# Patient Record
Sex: FEMALE | Race: WHITE | NOT HISPANIC OR LATINO | Employment: FULL TIME | URBAN - METROPOLITAN AREA
[De-identification: names, ages, dates, MRNs, and addresses within clinical notes are randomized per-mention and may not be internally consistent; named-entity substitution may affect disease eponyms.]

---

## 2017-01-09 ENCOUNTER — TRANSFERRED RECORDS (OUTPATIENT)
Dept: HEALTH INFORMATION MANAGEMENT | Facility: CLINIC | Age: 72
End: 2017-01-09

## 2017-02-14 ENCOUNTER — ANTICOAGULATION THERAPY VISIT (OUTPATIENT)
Dept: NURSING | Facility: CLINIC | Age: 72
End: 2017-02-14
Payer: COMMERCIAL

## 2017-02-14 DIAGNOSIS — Z79.01 LONG-TERM (CURRENT) USE OF ANTICOAGULANTS: ICD-10-CM

## 2017-02-14 DIAGNOSIS — Z86.718 PERSONAL HISTORY OF DVT (DEEP VEIN THROMBOSIS): ICD-10-CM

## 2017-02-14 DIAGNOSIS — D68.51 FACTOR V LEIDEN MUTATION (H): ICD-10-CM

## 2017-02-14 LAB — INR POINT OF CARE: 2.2 (ref 0.86–1.14)

## 2017-02-14 PROCEDURE — 99207 ZZC NO CHARGE NURSE ONLY: CPT

## 2017-02-14 PROCEDURE — 36416 COLLJ CAPILLARY BLOOD SPEC: CPT

## 2017-02-14 PROCEDURE — 85610 PROTHROMBIN TIME: CPT | Mod: QW

## 2017-02-14 NOTE — PROGRESS NOTES
ANTICOAGULATION FOLLOW-UP CLINIC VISIT    Patient Name:  Kathya Breaux  Date:  2/14/2017  Contact Type:  Face to Face    SUBJECTIVE:     Patient Findings     Positives No Problem Findings    Comments ? Missed a day without salad, change in diet             OBJECTIVE    INR Protime   Date Value Ref Range Status   12/30/2016 1.7 (A) 0.86 - 1.14 Final       ASSESSMENT / PLAN  INR assessment SUPRA    Recheck INR In: 6 WEEKS    INR Location Clinic      Anticoagulation Summary as of 2/14/2017     INR goal 1.5-2.0   Today's INR    Maintenance plan 5 mg (5 mg x 1) on Mon, Wed, Fri; 2.5 mg (5 mg x 0.5) all other days   Full instructions 5 mg on Mon, Wed, Fri; 2.5 mg all other days   Weekly total 25 mg   No change documented Micaela Hylton, RN   Plan last modified Landy Garcia RN (3/11/2016)   Next INR check 3/28/2017   Priority INR   Target end date     Indications   Long-term (current) use of anticoagulants [Z79.01] [Z79.01]  Personal history of DVT (deep vein thrombosis) [Z86.718]  Factor V Leiden Heterozygote [D68.51]         Anticoagulation Episode Summary     INR check location     Preferred lab     Send INR reminders to AN ANTICOAG CLINIC    Comments       Anticoagulation Care Providers     Provider Role Specialty Phone number    Azalia Daugherty MD [facility] [phone]            See the Encounter Report to view Anticoagulation Flowsheet and Dosing Calendar (Go to Encounters tab in chart review, and find the Anticoagulation Therapy Visit)    Dosage adjustment made based on physician directed care plan.    Micaela Hylton, GUILLERMINA

## 2017-02-14 NOTE — MR AVS SNAPSHOT
Kathya HO Saeid   2/14/2017 9:00 AM   Anticoagulation Therapy Visit    Description:  71 year old female   Provider:  [facility]   Department:  [facility]           INR as of 2/14/2017     Today's INR       Anticoagulation Summary as of 2/14/2017     INR goal 1.5-2.0   Today's INR    Full instructions 5 mg on Mon, Wed, Fri; 2.5 mg all other days   Next INR check 3/28/2017    Indications   Long-term (current) use of anticoagulants [Z79.01] [Z79.01]  Personal history of DVT (deep vein thrombosis) [Z86.718]  Factor V Leiden Heterozygote [D68.51]         Description     Will have a green leafy salad today.        Your next Anticoagulation Clinic appointment(s)     Mar 28, 2017 10:00 AM CDT   [facility] with AN ANTI [facility]AG   [facility]    [address] [facility] [address] [phone]              Contact Numbers     [facility]  Please call [phone] to cancel and/or reschedule your appointment   Please call [phone] with any problems or questions regarding your therapy.        February 2017 Details    Sun Mon Tue Wed Thu Fri Sat        1               2               3               4                 5               6               7               8               9               10               11                 12               13               14      2.5 mg   See details      15      5 mg         16      2.5 mg         17      5 mg         18      2.5 mg           19      2.5 mg         20      5 mg         21      2.5 mg         22      5 mg         23      2.5 mg         24      5 mg         25      2.5 mg           26      2.5 mg         27      5 mg         28      2.5 mg              Date Details   02/14 This INR check               How to take your warfarin dose     To take:  2.5 mg Take 0.5 of a 5 mg tablet.    To take:  5 mg Take 1 of the 5 mg tablets.           March 2017 Details    Sun Mon Tue Wed Thu Fri Sat        1      5 mg          2      2.5 mg         3      5 mg         4      2.5 mg           5      2.5 mg         6      5 mg         7      2.5 mg         8      5 mg         9      2.5 mg         10      5 mg         11      2.5 mg           12      2.5 mg         13      5 mg         14      2.5 mg         15      5 mg         16      2.5 mg         17      5 mg         18      2.5 mg           19      2.5 mg         20      5 mg         21      2.5 mg         22      5 mg         23      2.5 mg         24      5 mg         25      2.5 mg           26      2.5 mg         27      5 mg         28            29               30               31                 Date Details   No additional details    Date of next INR:  3/28/2017         How to take your warfarin dose     To take:  2.5 mg Take 0.5 of a 5 mg tablet.    To take:  5 mg Take 1 of the 5 mg tablets.

## 2017-03-17 ENCOUNTER — TRANSFERRED RECORDS (OUTPATIENT)
Dept: HEALTH INFORMATION MANAGEMENT | Facility: CLINIC | Age: 72
End: 2017-03-17

## 2017-03-20 ENCOUNTER — TRANSFERRED RECORDS (OUTPATIENT)
Dept: HEALTH INFORMATION MANAGEMENT | Facility: CLINIC | Age: 72
End: 2017-03-20

## 2017-03-27 ENCOUNTER — ANTICOAGULATION THERAPY VISIT (OUTPATIENT)
Dept: NURSING | Facility: CLINIC | Age: 72
End: 2017-03-27
Payer: COMMERCIAL

## 2017-03-27 DIAGNOSIS — Z79.01 LONG-TERM (CURRENT) USE OF ANTICOAGULANTS: ICD-10-CM

## 2017-03-27 DIAGNOSIS — D68.51 FACTOR V LEIDEN MUTATION (H): ICD-10-CM

## 2017-03-27 DIAGNOSIS — Z86.718 PERSONAL HISTORY OF DVT (DEEP VEIN THROMBOSIS): ICD-10-CM

## 2017-03-27 LAB — INR POINT OF CARE: 1.7 (ref 0.86–1.14)

## 2017-03-27 PROCEDURE — 85610 PROTHROMBIN TIME: CPT | Mod: QW

## 2017-03-27 PROCEDURE — 99207 ZZC NO CHARGE NURSE ONLY: CPT

## 2017-03-27 PROCEDURE — 36416 COLLJ CAPILLARY BLOOD SPEC: CPT

## 2017-03-27 NOTE — MR AVS SNAPSHOT
Kathya Breaux   3/27/2017 10:00 AM   Anticoagulation Therapy Visit    Description:  71 year old female   Provider:  [facility]   Department:  [facility]           INR as of 3/27/2017     Today's INR 1.7      Anticoagulation Summary as of 3/27/2017     INR goal 1.5-2.0   Today's INR 1.7   Full instructions 5 mg on Mon, Wed, Fri; 2.5 mg all other days   Next INR check 5/8/2017    Indications   Long-term (current) use of anticoagulants [Z79.01] [Z79.01]  Personal history of DVT (deep vein thrombosis) [Z86.718]  Factor V Leiden Heterozygote [D68.51]         Your next Anticoagulation Clinic appointment(s)     Mar 27, 2017 10:00 AM CDT   Anticoagulation Visit with [facility] ANTI [facility]AG   [facility] ([address] [address] [phone]            May 08, 2017 10:00 AM CDT   Anticoagulation Visit with [facility] ANTI [facility]AG   [facility] ([address] [address] [phone]              Contact Numbers     [facility]  Please call [phone] to cancel and/or reschedule your appointment   Please call [phone] with any problems or questions regarding your therapy.        March 2017 Details    Sun Mon Tue Wed Thu Fri Sat        1               2               3               4                 5               6               7               8               9               10               11                 12               13               14               15               16               17               18                 19               20               21               22               23               24               25                 26               27      5 mg   See details      28      2.5 mg         29      5 mg         30      2.5 mg         31      5 mg           Date Details   03/27 This INR check               How to take your warfarin dose     To take:  2.5 mg Take 0.5 of a 5  mg tablet.    To take:  5 mg Take 1 of the 5 mg tablets.           April 2017 Details    Sun Mon Tue Wed Thu Fri Sat           1      2.5 mg           2      2.5 mg         3      5 mg         4      2.5 mg         5      5 mg         6      2.5 mg         7      5 mg         8      2.5 mg           9      2.5 mg         10      5 mg         11      2.5 mg         12      5 mg         13      2.5 mg         14      5 mg         15      2.5 mg           16      2.5 mg         17      5 mg         18      2.5 mg         19      5 mg         20      2.5 mg         21      5 mg         22      2.5 mg           23      2.5 mg         24      5 mg         25      2.5 mg         26      5 mg         27      2.5 mg         28      5 mg         29      2.5 mg           30      2.5 mg                Date Details   No additional details            How to take your warfarin dose     To take:  2.5 mg Take 0.5 of a 5 mg tablet.    To take:  5 mg Take 1 of the 5 mg tablets.           May 2017 Details    Sun Mon Tue Wed Thu Fri Sat      1      5 mg         2      2.5 mg         3      5 mg         4      2.5 mg         5      5 mg         6      2.5 mg           7      2.5 mg         8            9               10               11               12               13                 14               15               16               17               18               19               20                 21               22               23               24               25               26               27                 28               29               30               31                   Date Details   No additional details    Date of next INR:  5/8/2017         How to take your warfarin dose     To take:  2.5 mg Take 0.5 of a 5 mg tablet.    To take:  5 mg Take 1 of the 5 mg tablets.

## 2017-03-27 NOTE — PROGRESS NOTES
ANTICOAGULATION FOLLOW-UP CLINIC VISIT    Patient Name:  Kathya Breaux  Date:  3/27/2017  Contact Type:  Face to Face    SUBJECTIVE:     Patient Findings     Positives No Problem Findings           OBJECTIVE    INR Protime   Date Value Ref Range Status   03/27/2017 1.7 (A) 0.86 - 1.14 Final       ASSESSMENT / PLAN  INR assessment THER    Recheck INR In: 6 WEEKS    INR Location Clinic      Anticoagulation Summary as of 3/27/2017     INR goal 1.5-2.0   Today's INR 1.7   Maintenance plan 5 mg (5 mg x 1) on Mon, Wed, Fri; 2.5 mg (5 mg x 0.5) all other days   Full instructions 5 mg on Mon, Wed, Fri; 2.5 mg all other days   Weekly total 25 mg   No change documented Phyllis Patel RN   Plan last modified Landy Garcia RN (3/11/2016)   Next INR check 5/8/2017   Priority INR   Target end date     Indications   Long-term (current) use of anticoagulants [Z79.01] [Z79.01]  Personal history of DVT (deep vein thrombosis) [Z86.718]  Factor V Leiden Heterozygote [D68.51]         Anticoagulation Episode Summary     INR check location     Preferred lab     Send INR reminders to AN ANTICOAG CLINIC    Comments       Anticoagulation Care Providers     Provider Role Specialty Phone number    Azalia Daugherty MD [facility] [phone]            See the Encounter Report to view Anticoagulation Flowsheet and Dosing Calendar (Go to Encounters tab in chart review, and find the Anticoagulation Therapy Visit)    Dosage adjustment made based on physician directed care plan. Reviewed both bleeding and clotting signs and symptoms with patient this visit. Pt. has no further questions or concerns.  Will call with any changes to diet, medications, or missed doses at INR line [phone].      Phyllis Patel, RN

## 2017-05-08 ENCOUNTER — ANTICOAGULATION THERAPY VISIT (OUTPATIENT)
Dept: NURSING | Facility: CLINIC | Age: 72
End: 2017-05-08
Payer: COMMERCIAL

## 2017-05-08 DIAGNOSIS — D68.51 FACTOR V LEIDEN MUTATION (H): ICD-10-CM

## 2017-05-08 DIAGNOSIS — Z86.718 PERSONAL HISTORY OF DVT (DEEP VEIN THROMBOSIS): ICD-10-CM

## 2017-05-08 DIAGNOSIS — Z79.01 LONG-TERM (CURRENT) USE OF ANTICOAGULANTS: ICD-10-CM

## 2017-05-08 LAB — INR POINT OF CARE: 2.2 (ref 0.86–1.14)

## 2017-05-08 PROCEDURE — 85610 PROTHROMBIN TIME: CPT | Mod: QW

## 2017-05-08 PROCEDURE — 36416 COLLJ CAPILLARY BLOOD SPEC: CPT

## 2017-05-08 PROCEDURE — 99207 ZZC NO CHARGE NURSE ONLY: CPT

## 2017-05-08 NOTE — MR AVS SNAPSHOT
Kathyadean Breaux   5/8/2017 10:00 AM   Anticoagulation Therapy Visit    Description:  71 year old female   Provider:  [facility]   Department:  [facility]           INR as of 5/8/2017     Today's INR 2.2!      Anticoagulation Summary as of 5/8/2017     INR goal 1.5-2.0   Today's INR 2.2!   Full instructions 5 mg on Mon, Wed, Fri; 2.5 mg all other days   Next INR check 6/19/2017    Indications   Long-term (current) use of anticoagulants [Z79.01] [Z79.01]  Personal history of DVT (deep vein thrombosis) [Z86.718]  Factor V Leiden Heterozygote [D68.51]         Your next Anticoagulation Clinic appointment(s)     May 08, 2017 10:00 AM CDT   [facility]agulation [facility] with [facility]TI [facility]   [facility]    [address] [place] [address] [phone]            Jun 19, 2017 10:00 AM CDT   Anticoagulation Visit with [facility] ANTI [facility]AG   [facility]    [address] [place] [address] [phone]              Contact Numbers     [facility]  Please call [phone] to cancel and/or reschedule your appointment   Please call [phone] with any problems or questions regarding your therapy.        May 2017 Details    Sun Mon Tue Wed Thu Fri Sat      1               2               3               4               5               6                 7               8      5 mg   See details      9      2.5 mg         10      5 mg         11      2.5 mg         12      5 mg         13      2.5 mg           14      2.5 mg         15      5 mg         16      2.5 mg         17      5 mg         18      2.5 mg         19      5 mg         20      2.5 mg           21      2.5 mg         22      5 mg         23      2.5 mg         24      5 mg         25      2.5 mg         26      5 mg         27      2.5 mg           28      2.5 mg         29      5 mg         30      2.5 mg         31      5 mg             Date Details   05/08  This INR check               How to take your warfarin dose     To take:  2.5 mg Take 0.5 of a 5 mg tablet.    To take:  5 mg Take 1 of the 5 mg tablets.           June 2017 Details    Sun Mon Tue Wed Thu Fri Sat         1      2.5 mg         2      5 mg         3      2.5 mg           4      2.5 mg         5      5 mg         6      2.5 mg         7      5 mg         8      2.5 mg         9      5 mg         10      2.5 mg           11      2.5 mg         12      5 mg         13      2.5 mg         14      5 mg         15      2.5 mg         16      5 mg         17      2.5 mg           18      2.5 mg         19            20               21               22               23               24                 25               26               27               28               29               30                 Date Details   No additional details    Date of next INR:  6/19/2017         How to take your warfarin dose     To take:  2.5 mg Take 0.5 of a 5 mg tablet.    To take:  5 mg Take 1 of the 5 mg tablets.

## 2017-05-08 NOTE — PROGRESS NOTES
ANTICOAGULATION FOLLOW-UP CLINIC VISIT    Patient Name:  Kathya Breaux  Date:  5/8/2017  Contact Type:  Face to Face    SUBJECTIVE:     Patient Findings     Positives No Problem Findings    Comments Pt. Just got back from a long vacation - reports eating differently than normal.           OBJECTIVE    INR Protime   Date Value Ref Range Status   05/08/2017 2.2 (A) 0.86 - 1.14 Final       ASSESSMENT / PLAN  INR assessment THER    Recheck INR In: 6 WEEKS    INR Location Clinic      Anticoagulation Summary as of 5/8/2017     INR goal 1.5-2.0   Today's INR 2.2!   Maintenance plan 5 mg (5 mg x 1) on Mon, Wed, Fri; 2.5 mg (5 mg x 0.5) all other days   Full instructions 5 mg on Mon, Wed, Fri; 2.5 mg all other days   Weekly total 25 mg   No change documented Phyllis Patel RN   Plan last modified Landy Garcia RN (3/11/2016)   Next INR check 6/19/2017   Priority INR   Target end date     Indications   Long-term (current) use of anticoagulants [Z79.01] [Z79.01]  Personal history of DVT (deep vein thrombosis) [Z86.718]  Factor V Leiden Heterozygote [D68.51]         Anticoagulation Episode Summary     INR check location     Preferred lab     Send INR reminders to AN ANTICOAG CLINIC    Comments       Anticoagulation Care Providers     Provider Role Specialty Phone number    Azalia Daugherty MD [facility] [phone]            See the Encounter Report to view Anticoagulation Flowsheet and Dosing Calendar (Go to Encounters tab in chart review, and find the Anticoagulation Therapy Visit)    Dosage adjustment made based on physician directed care plan. Reviewed both bleeding and clotting signs and symptoms with patient this visit. Pt. has no further questions or concerns.  Will call with any changes to diet, medications, or missed doses at INR line [phone].      Phyllis Patel, GUILLERMINA

## 2017-06-09 ENCOUNTER — TRANSFERRED RECORDS (OUTPATIENT)
Dept: HEALTH INFORMATION MANAGEMENT | Facility: CLINIC | Age: 72
End: 2017-06-09

## 2017-06-19 ENCOUNTER — ANTICOAGULATION THERAPY VISIT (OUTPATIENT)
Dept: NURSING | Facility: CLINIC | Age: 72
End: 2017-06-19
Payer: COMMERCIAL

## 2017-06-19 DIAGNOSIS — Z86.718 PERSONAL HISTORY OF DVT (DEEP VEIN THROMBOSIS): ICD-10-CM

## 2017-06-19 DIAGNOSIS — Z79.01 LONG-TERM (CURRENT) USE OF ANTICOAGULANTS: ICD-10-CM

## 2017-06-19 DIAGNOSIS — D68.51 FACTOR V LEIDEN MUTATION (H): ICD-10-CM

## 2017-06-19 LAB — INR POINT OF CARE: 2.1 (ref 0.86–1.14)

## 2017-06-19 PROCEDURE — 85610 PROTHROMBIN TIME: CPT | Mod: QW

## 2017-06-19 PROCEDURE — 99207 ZZC NO CHARGE NURSE ONLY: CPT

## 2017-06-19 PROCEDURE — 36416 COLLJ CAPILLARY BLOOD SPEC: CPT

## 2017-06-19 NOTE — PROGRESS NOTES
ANTICOAGULATION FOLLOW-UP CLINIC VISIT    Patient Name:  Kathya Breaux  Date:  6/19/2017  Contact Type:  Face to Face    SUBJECTIVE:     Patient Findings     Positives No Problem Findings           OBJECTIVE    INR Protime   Date Value Ref Range Status   06/19/2017 2.1 (A) 0.86 - 1.14 Final       ASSESSMENT / PLAN  INR assessment THER    Recheck INR In: 6 WEEKS    INR Location Clinic      Anticoagulation Summary as of 6/19/2017     INR goal 1.5-2.0   Today's INR 2.1!   Maintenance plan 5 mg (5 mg x 1) on Mon, Wed, Fri; 2.5 mg (5 mg x 0.5) all other days   Full instructions 5 mg on Mon, Wed, Fri; 2.5 mg all other days   Weekly total 25 mg   No change documented Phyllis Patel RN   Plan last modified Landy Garcia RN (3/11/2016)   Next INR check 7/31/2017   Priority INR   Target end date     Indications   Long-term (current) use of anticoagulants [Z79.01] [Z79.01]  Personal history of DVT (deep vein thrombosis) [Z86.718]  Factor V Leiden Heterozygote [D68.51]         Anticoagulation Episode Summary     INR check location     Preferred lab     Send INR reminders to AN ANTICOAG CLINIC    Comments       Anticoagulation Care Providers     Provider Role Specialty Phone number    Azalia Daugherty MD [facility] [phone]            See the Encounter Report to view Anticoagulation Flowsheet and Dosing Calendar (Go to Encounters tab in chart review, and find the Anticoagulation Therapy Visit)    Dosage adjustment made based on physician directed care plan. Reviewed both bleeding and clotting signs and symptoms with patient this visit. Pt. has no further questions or concerns.  Will call with any changes to diet, medications, or missed doses at INR line [phone].      Phyllis Patel, RN

## 2017-06-19 NOTE — MR AVS SNAPSHOT
Kathya VIC Saeid   6/19/2017 1:00 PM   Anticoagulation Therapy Visit    Description:  71 year old female   Provider:  [facility]   Department:  [facility]           INR as of 6/19/2017     Today's INR 2.1!      Anticoagulation Summary as of 6/19/2017     INR goal 1.5-2.0   Today's INR 2.1!   Full instructions 5 mg on Mon, Wed, Fri; 2.5 mg all other days   Next INR check 7/31/2017    Indications   Long-term (current) use of anticoagulants [Z79.01] [Z79.01]  Personal history of DVT (deep vein thrombosis) [Z86.718]  Factor V Leiden Heterozygote [D68.51]         Your next Anticoagulation Clinic appointment(s)     Jun 19, 2017  1:00 PM CDT   [facility]    [address] [place] [address] [phone]            Jul 31, 2017  9:00 AM CDT   [facility]agulation [facility] with [facility]    [address] [place] [address] [phone]              Contact Numbers     [facility]  Please call [phone] to cancel and/or reschedule your appointment   Please call [phone] with any problems or questions regarding your therapy.        June 2017 Details    Sun Mon Tue Wed Thu Fri Sat         1               2               3                 4               5               6               7               8               9               10                 11               12               13               14               15               16               17                 18               19      5 mg   See details      20      2.5 mg         21      5 mg         22      2.5 mg         23      5 mg         24      2.5 mg           25      2.5 mg         26      5 mg         27      2.5 mg         28      5 mg         29      2.5 mg         30      5 mg           Date Details   06/19 This INR check               How to take your warfarin dose     To take:  2.5  mg Take 0.5 of a 5 mg tablet.    To take:  5 mg Take 1 of the 5 mg tablets.           July 2017 Details    Sun Mon Tue Wed Thu Fri Sat           1      2.5 mg           2      2.5 mg         3      5 mg         4      2.5 mg         5      5 mg         6      2.5 mg         7      5 mg         8      2.5 mg           9      2.5 mg         10      5 mg         11      2.5 mg         12      5 mg         13      2.5 mg         14      5 mg         15      2.5 mg           16      2.5 mg         17      5 mg         18      2.5 mg         19      5 mg         20      2.5 mg         21      5 mg         22      2.5 mg           23      2.5 mg         24      5 mg         25      2.5 mg         26      5 mg         27      2.5 mg         28      5 mg         29      2.5 mg           30      2.5 mg         31                  Date Details   No additional details    Date of next INR:  7/31/2017         How to take your warfarin dose     To take:  2.5 mg Take 0.5 of a 5 mg tablet.    To take:  5 mg Take 1 of the 5 mg tablets.

## 2017-07-31 ENCOUNTER — OFFICE VISIT (OUTPATIENT)
Dept: FAMILY MEDICINE | Facility: CLINIC | Age: 72
End: 2017-07-31
Payer: COMMERCIAL

## 2017-07-31 VITALS
WEIGHT: 138.4 LBS | SYSTOLIC BLOOD PRESSURE: 98 MMHG | HEART RATE: 105 BPM | BODY MASS INDEX: 24.13 KG/M2 | OXYGEN SATURATION: 97 % | TEMPERATURE: 98.6 F | DIASTOLIC BLOOD PRESSURE: 68 MMHG
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
## 2017-07-31 DIAGNOSIS — M79.10 MYALGIA: ICD-10-CM

## 2017-07-31 DIAGNOSIS — R79.82 CRP ELEVATED: ICD-10-CM

## 2017-07-31 DIAGNOSIS — M25.50 MULTIPLE JOINT PAIN: ICD-10-CM

## 2017-07-31 LAB
ERYTHROCYTE [DISTWIDTH] IN BLOOD BY AUTOMATED COUNT: 14.1 % (ref 10–15)
HCT VFR BLD AUTO: 45.8 % (ref 35–47)
HGB BLD-MCNC: 15.9 G/DL (ref 11.7–15.7)
MCH RBC QN AUTO: 32.9 PG (ref 26.5–33)
MCHC RBC AUTO-ENTMCNC: 34.7 G/DL (ref 31.5–36.5)
MCV RBC AUTO: 95 FL (ref 78–100)
PLATELET # BLD AUTO: 135 10E9/L (ref 150–450)
RBC # BLD AUTO: 4.83 10E12/L (ref 3.8–5.2)
WBC # BLD AUTO: 3.1 10E9/L (ref 4–11)

## 2017-07-31 PROCEDURE — 99214 OFFICE O/P EST MOD 30 MIN: CPT | Performed by: NURSE PRACTITIONER

## 2017-07-31 PROCEDURE — 82550 ASSAY OF CK (CPK): CPT | Performed by: NURSE PRACTITIONER

## 2017-07-31 PROCEDURE — 85027 COMPLETE CBC AUTOMATED: CPT | Performed by: NURSE PRACTITIONER

## 2017-07-31 PROCEDURE — 85652 RBC SED RATE AUTOMATED: CPT | Performed by: NURSE PRACTITIONER

## 2017-07-31 PROCEDURE — 87798 DETECT AGENT NOS DNA AMP: CPT | Mod: 90 | Performed by: NURSE PRACTITIONER

## 2017-07-31 PROCEDURE — 36415 COLL VENOUS BLD VENIPUNCTURE: CPT | Performed by: NURSE PRACTITIONER

## 2017-07-31 PROCEDURE — 86618 LYME DISEASE ANTIBODY: CPT | Performed by: NURSE PRACTITIONER

## 2017-07-31 PROCEDURE — 80048 BASIC METABOLIC PNL TOTAL CA: CPT | Performed by: NURSE PRACTITIONER

## 2017-07-31 PROCEDURE — 86140 C-REACTIVE PROTEIN: CPT | Performed by: NURSE PRACTITIONER

## 2017-07-31 PROCEDURE — 99000 SPECIMEN HANDLING OFFICE-LAB: CPT | Performed by: NURSE PRACTITIONER

## 2017-07-31 NOTE — NURSING NOTE
"Chief Complaint   Patient presents with     Tick Bite     body aches, fever, chills, sweats, headache x3 weeks       Initial BP 98/68 (BP Location: Left arm, Cuff Size: Adult Regular)  Pulse 105  Temp 98.6  F (37  C) (Oral)  Wt 138 lb 6.4 oz (62.8 kg)  SpO2 97%  Breastfeeding? No  BMI 24.13 kg/m2 Estimated body mass index is 24.13 kg/(m^2) as calculated from the following:    Height as of 10/21/16: 5' 3.5\" (1.613 m).    Weight as of this encounter: 138 lb 6.4 oz (62.8 kg).  Medication Reconciliation: complete       Adenike Ochoa CMA      "

## 2017-07-31 NOTE — PROGRESS NOTES
"  SUBJECTIVE:                                                    Kathya Breaux is a 71 year old female who presents to clinic today for the following health issues:      ENT Symptoms             Symptoms: cc Present Absent Comment   Fever/Chills  x  Not sure on temp   Fatigue  x     Muscle Aches  x     Eye Irritation   x    Sneezing   x    Nasal Chris/Drg   x    Sinus Pressure/Pain  x  headache   Loss of smell   x    Dental pain   x    Sore Throat   x    Swollen Glands   x    Ear Pain/Fullness   x    Cough  x  Dry mouth   Wheeze   x    Chest Pain   x    Shortness of breath   x    Rash   x    Other  x  Tick bite     Symptom duration:  x1 weeks   Symptom severity:  moderate-severe   Treatments tried:  Tylenol   Contacts:  none       Patient had a wood tick bite 3 weeks ago.  She was in [place] and is unsure how long tick was attached.  She noticed symptoms of fatigue, extreme myalgia, joint pain, feeling feverish, headache for the past week.  Patient denies rash, jaw claudication, vision changes.    Problem list and histories reviewed & adjusted, as indicated.  Additional history: as documented    Patient Active Problem List   Diagnosis     Personal history of DVT (deep vein thrombosis)     Factor V Leiden Heterozygote     CARDIOVASCULAR SCREENING; LDL GOAL LESS THAN 130     Long-term (current) use of anticoagulants [Z79.01]     Posterior vitreous detachment, bilateral     Hx of LASIK     Past Surgical History:   Procedure Laterality Date     C NONSPECIFIC PROCEDURE  1964    3 1/2\" sm. intestine removed-illeitis     C NONSPECIFIC PROCEDURE  1969    4\" sm. intestine removed-illeitis     C NONSPECIFIC PROCEDURE  1980    Hysterectomy     COLONOSCOPY  5/2016    Q 5 years      ENHANCE LASER REFRACTIVE BILATERAL EXISTING PT IN PARAMETERS         Social History   Substance Use Topics     Smoking status: Former Smoker     Packs/day: 0.50     Years: 38.00     Quit date: 3/1/2008     Smokeless tobacco: Never Used      " Comment: 6 cigs per day     Alcohol use No     Family History   Problem Relation Age of Onset     HEART DISEASE Father      dysrythmia's     Glaucoma Father      HEART DISEASE Son      clogged arteries     Cardiovascular Son      sudden heart attack     Circulatory Mother      phelbities     Unknown/Adopted Maternal Grandmother      Gynecology Maternal Grandfather      CEREBROVASCULAR DISEASE Paternal Grandmother      CEREBROVASCULAR DISEASE Paternal Grandfather      Alcohol/Drug Brother      alcohol and drugs     Alcohol/Drug Sister      alcohol and drugs         Current Outpatient Prescriptions   Medication Sig Dispense Refill     warfarin (COUMADIN) 5 MG tablet Take 1 tab M,W,F. Take 1/2 tablet the rest of the week or as directed 90 tablet 3     VITAMIN-B COMPLEX OR TABS 1 tablet daily       OMEGA 3-6-9 FATTY ACIDS OR 1 TABLET DAILY       IMURAN 50 MG OR TABS 1 TABLET DAILY       OMEPRAZOLE 20 MG OR CPDR 1 CAPSULE DAILY       VITAMIN B-12 1000 MCG/ML IJ SOLN 1000 MCG Monthly  0     CALCIUM 500 + D 500-200 MG-IU OR TABS 1 TABLET DAILY  0     MULTI-VITAMIN OR TABS 1 TABLET DAILY  0     Allergies   Allergen Reactions     Penicillins Hives     Throat closed     BP Readings from Last 3 Encounters:   07/31/17 98/68   10/21/16 124/84   06/24/14 120/74    Wt Readings from Last 3 Encounters:   07/31/17 138 lb 6.4 oz (62.8 kg)   10/21/16 137 lb 3.2 oz (62.2 kg)   08/22/16 138 lb 12.8 oz (63 kg)                  Labs reviewed in EPIC        Reviewed and updated as needed this visit by clinical staff     Reviewed and updated as needed this visit by Provider         ROS:  Constitutional, HEENT, cardiovascular, pulmonary, gi and gu systems are negative, except as otherwise noted.      OBJECTIVE:   BP 98/68 (BP Location: Left arm, Cuff Size: Adult Regular)  Pulse 105  Temp 98.6  F (37  C) (Oral)  Wt 138 lb 6.4 oz (62.8 kg)  SpO2 97%  Breastfeeding? No  BMI 24.13 kg/m2  Body mass index is 24.13 kg/(m^2).  GENERAL: healthy,  alert and no distress  EYES: Eyes grossly normal to inspection, PERRL and conjunctivae and sclerae normal  HENT: ear canals and TM's normal, nose and mouth without ulcers or lesions  NECK: no adenopathy, no asymmetry, masses, or scars and thyroid normal to palpation  RESP: lungs clear to auscultation - no rales, rhonchi or wheezes  CV: regular rate and rhythm, normal S1 S2, no S3 or S4, no murmur, click or rub, no peripheral edema and peripheral pulses strong  MS: no gross musculoskeletal defects noted, no edema    Diagnostic Test Results:  pending    ASSESSMENT/PLAN:     1. Tick bite, initial encounter  See below.  I'm uncertain which tick borne illnesses are endemic to [place], but will start with labs as below.    2. Myalgia  Rule out polymyalgia rheumatica, temporal arteritis as cause of symptoms as well.  - Lyme Disease Naye with reflex to WB Serum  - Ehrlichia Anaplasma Sp by PCR  - Babesia Species by PCR  - Erythrocyte sedimentation rate auto  - CRP inflammation  - CBC with platelets  - CK total  - Basic metabolic panel    3. Multiple joint pain    - Erythrocyte sedimentation rate auto  - CRP inflammation  - CBC with platelets    FUTURE APPOINTMENTS:       - Follow-up visit pending test results.    JULIETH Goldberg [facility]

## 2017-07-31 NOTE — PATIENT INSTRUCTIONS
[facility]    If you have any questions regarding to your visit please contact your care team:     Team Pink:   Clinic Hours Telephone Number   Internal Medicine:  Dr. Blanquita Monreal NP       7am-7pm  Monday - Thursday   7am-5pm  Fridays  [phone]  (Appointment scheduling available 24/7)    Questions about your visit?  Team Line  [phone]   Urgent Care - [place] and [facility] - 11am-9pm Monday-Friday Saturday-Sunday- 9am-5pm   [place] - 5pm-9pm Monday-Friday Saturday-Sunday- 9am-5pm  [phone] - [place]   [phone] - [place]       What options do I have for visits at the clinic other than the traditional office visit?  To expand how we care for you, many of our providers are utilizing electronic visits (e-visits) and telephone visits, when medically appropriate, for interactions with their patients rather than a visit in the clinic.   We also offer nurse visits for many medical concerns. Just like any other service, we will bill your insurance company for this type of visit based on time spent on the phone with your provider. Not all insurance companies cover these visits. Please check with your medical insurance if this type of visit is covered. You will be responsible for any charges that are not paid by your insurance.      E-visits via "[employer]":  generally incur a $35.00 fee.  Telephone visits:  Time spent on the phone: *charged based on time that is spent on the phone in increments of 10 minutes. Estimated cost:   5-10 mins $30.00   11-20 mins. $59.00   21-30 mins. $85.00   Use JoKnot (secure email communication and access to your chart) to send your primary care provider a message or make an appointment. Ask someone on your Team how to sign up for "[employer]".    For a Price Quote for your services, please call our Consumer Price Line at [phone].    As always, Thank you for trusting us with your health care  needs!    LIZANDRO

## 2017-07-31 NOTE — MR AVS SNAPSHOT
After Visit Summary   7/31/2017    Kathya Breaux    MRN: [id number]           Patient Information     Date Of Birth          1945        Visit Information        Provider Department      7/31/2017 2:40 PM Katlin Monreal APRN [facility]        Today's Diagnoses     Tick bite, initial encounter    -  1    Myalgia        Multiple joint pain          Care Instructions    [facility]    If you have any questions regarding to your visit please contact your care team:     Team Pink:   Clinic Hours Telephone Number   Internal Medicine:  Dr. Blanquita Monreal, NP       7am-7pm  Monday - Thursday   7am-5pm  Fridays  [phone]  (Appointment scheduling available 24/7)    Questions about your visit?  Team Line  [phone]   Urgent Care - [place] and [place] - 11am-9pm Monday-Friday Saturday-Sunday- 9am-5pm   [place] - 5pm-9pm Monday-Friday Saturday-Sunday- 9am-5pm  [phone] - [place]   [phone] - [place]       What options do I have for visits at the clinic other than the traditional office visit?  To expand how we care for you, many of our providers are utilizing electronic visits (e-visits) and telephone visits, when medically appropriate, for interactions with their patients rather than a visit in the clinic.   We also offer nurse visits for many medical concerns. Just like any other service, we will bill your insurance company for this type of visit based on time spent on the phone with your provider. Not all insurance companies cover these visits. Please check with your medical insurance if this type of visit is covered. You will be responsible for any charges that are not paid by your insurance.      E-visits via [employer]:  generally incur a $35.00 fee.  Telephone visits:  Time spent on the phone: *charged based on time that is spent on the phone in increments of 10 minutes. Estimated  "cost:   5-10 mins $30.00   11-20 mins. $59.00   21-30 mins. $85.00   Use [employer] (secure email communication and access to your chart) to send your primary care provider a message or make an appointment. Ask someone on your Team how to sign up for [employer].    For a Price Quote for your services, please call our [employer] Line at [phone].    As always, Thank you for trusting us with your health care needs!    LIZETH/MA            Follow-ups after your visit        Who to contact     If you have questions or need follow up information about today's clinic visit or your schedule please contact [facility] directly at [phone].  Normal or non-critical lab and imaging results will be communicated to you by MyChart, letter or phone within 4 business days after the clinic has received the results. If you do not hear from us within 7 days, please contact the clinic through [employer] or phone. If you have a critical or abnormal lab result, we will notify you by phone as soon as possible.  Submit refill requests through Shotlst or call your pharmacy and they will forward the refill request to us. Please allow 3 business days for your refill to be completed.          Additional Information About Your Visit        Prizzmt Information     Prizzmt lets you send messages to your doctor, view your test results, renew your prescriptions, schedule appointments and more. To sign up, go to [email or web address] . Click on \"Log in\" on the left side of the screen, which will take you to the Welcome page. Then click on \"Sign up Now\" on the right side of the page.     You will be asked to enter the access code listed below, as well as some personal information. Please follow the directions to create your username and password.     Your access code is: [email or web address]  Expires: 10/29/2017  3:44 PM     Your access code will  in 90 days. If you need help or a new code, please call your [facility] or " [phone].        Care EveryWhere ID     This is your Care EveryWhere ID. This could be used by other organizations to access your [facility] medical records  [id number]        Your Vitals Were     Pulse Temperature Pulse Oximetry Breastfeeding? BMI (Body Mass Index)       105 98.6  F (37  C) (Oral) 97% No 24.13 kg/m2        Blood Pressure from Last 3 Encounters:   07/31/17 98/68   10/21/16 124/84   06/24/14 120/74    Weight from Last 3 Encounters:   07/31/17 138 lb 6.4 oz (62.8 kg)   10/21/16 137 lb 3.2 oz (62.2 kg)   08/22/16 138 lb 12.8 oz (63 kg)              We Performed the Following     Babesia Species by PCR     Basic metabolic panel     CBC with platelets     CK total     CRP inflammation     Ehrlichia Anaplasma Sp by PCR     Erythrocyte sedimentation rate auto     Lyme Disease Naye with reflex to WB Serum        Primary Care Provider Office Phone # Fax #    Azalia Daugherty [phone]       [address] [address]        Equal Access to Services     FERMÍN KAMINSKI : Hadii aad ku hadasho Soomaali, waaxda luqadaha, qaybta kaalmada adeegyada, nika nugent hayric marmolejo . So [facility] [phone].    ATENCIÓN: Si habla español, tiene a parrish disposición servicios gratuitos de asistencia lingüística. Mark al [phone].    We comply with applicable federal civil rights laws and [place] laws. We do not discriminate on the basis of race, color, national origin, age, disability sex, sexual orientation or gender identity.            Thank you!     Thank you for choosing [facility]  for your care. Our goal is always to provide you with excellent care. Hearing back from our patients is one way we can continue to improve our services. Please take a few minutes to complete the written survey that you may receive in the mail after your visit with us. Thank you!             Your Updated Medication List - Protect others around  you: Learn how to safely use, store and throw away your medicines at www.disposemymeds.org.          This list is accurate as of: 7/31/17  3:45 PM.  Always use your most recent med list.                   Brand Name Dispense Instructions for use Diagnosis    CALCIUM 500 + D 500-200 MG-IU Tabs      1 TABLET DAILY        IMURAN 50 MG tablet   Generic drug:  azaTHIOprine      1 TABLET DAILY    Routine gynecological examination       Multi-vitamin Tabs tablet   Generic drug:  multivitamin, therapeutic with minerals      1 TABLET DAILY        OMEGA 3-6-9 FATTY ACIDS PO      1 TABLET DAILY        omeprazole 20 MG CR capsule    priLOSEC     1 CAPSULE DAILY    Routine gynecological examination       Vitamin B-12 1000 MCG/ML Soln      1000 MCG Monthly    Routine gynecological examination       Vitamin-B Complex Tabs      1 tablet daily        warfarin 5 MG tablet    COUMADIN    90 tablet    Take 1 tab M,W,F. Take 1/2 tablet the rest of the week or as directed    Personal history of venous thrombosis and embolism, Primary hypercoagulable state (H)

## 2017-08-01 LAB
ANION GAP SERPL CALCULATED.3IONS-SCNC: 7 MMOL/L (ref 3–14)
BUN SERPL-MCNC: 12 MG/DL (ref 7–30)
CALCIUM SERPL-MCNC: 9.8 MG/DL (ref 8.5–10.1)
CHLORIDE SERPL-SCNC: 100 MMOL/L (ref 94–109)
CK SERPL-CCNC: 71 U/L (ref 30–225)
CO2 SERPL-SCNC: 29 MMOL/L (ref 20–32)
CREAT SERPL-MCNC: 0.73 MG/DL (ref 0.52–1.04)
CRP SERPL-MCNC: 86.3 MG/L (ref 0–8)
ERYTHROCYTE [SEDIMENTATION RATE] IN BLOOD BY WESTERGREN METHOD: 13 MM/H (ref 0–30)
GFR SERPL CREATININE-BSD FRML MDRD: 78 ML/MIN/1.7M2
GLUCOSE SERPL-MCNC: 98 MG/DL (ref 70–99)
POTASSIUM SERPL-SCNC: 4.7 MMOL/L (ref 3.4–5.3)
SODIUM SERPL-SCNC: 136 MMOL/L (ref 133–144)

## 2017-08-02 LAB — B BURGDOR IGG+IGM SER QL: 0.1 (ref 0–0.89)

## 2017-08-02 NOTE — PROGRESS NOTES
Dear Kathya,    Your recent test results are attached.      One of your markers of inflammation is elevated, indicating possible infection or polymyalgia rheumatica.  I would like to wait on your lyme test to start treatment.  Hopefully this will return in the next day.  Please let me know if your symptoms are worsening.    Normal kidney function and electrolytes.    You also have a mild decline in your WBC count and platelet count.  This may possible be related to infection.  I will await your final test results and determine next steps.      If you have any questions please feel free to contact [phone] or myself via CAPPTUREt.    Sincerely,  Katlin Monreal, CNP

## 2017-08-03 LAB
A PHAGOCYTOPH DNA BLD QL NAA+PROBE: NOT DETECTED
B MICROTI DNA SPEC QL NAA+PROBE: NORMAL
BABESIA DNA SPEC QL NAA+PROBE: NOT DETECTED
E CHAFFEENSIS DNA BLD QL NAA+PROBE: NOT DETECTED
E EWINGII DNA SPEC QL NAA+PROBE: NOT DETECTED
EHRLICHIA DNA SPEC QL NAA+PROBE: NORMAL

## 2017-08-03 NOTE — PROGRESS NOTES
Dear Kathya,    Your recent test results are attached.      No lyme disease.  I spoke with your  this morning and will call you tomorrow to touch base.  I am hoping to have the remaining test results back then.    If you have any questions please feel free to contact [phone] or myself via [email or web address].    Sincerely,  Katlin Monreal, CNP

## 2017-08-04 NOTE — PROGRESS NOTES
I discussed results with patient.  There is no evidence of tick borne illness on her labs.  She reports feeling better.  She denies headache, vision changes, jaw claudication.  Her myalgia is improving.  She does now remember that she has had a flare of symptoms similar to this in the past with Chrohn's flare.  She does note some diarrhea.  In the past with flares, she has just monitored symptoms.  This may be the cause of patient's elevated CRP.  Patient to return for repeat lab draw of CRP, CBC with platelets (indication elevated CRP) on 8/7/17 at 10:30 at her INR appointment.  Please place order for CRP and CBC with platelets and schedule lab for 10:30 on 8/7/17.    Thanks,  Katlin Monreal, CNP

## 2017-08-07 ENCOUNTER — ANTICOAGULATION THERAPY VISIT (OUTPATIENT)
Dept: NURSING | Facility: CLINIC | Age: 72
End: 2017-08-07
Payer: COMMERCIAL

## 2017-08-07 ENCOUNTER — DOCUMENTATION ONLY (OUTPATIENT)
Dept: LAB | Facility: CLINIC | Age: 72
End: 2017-08-07

## 2017-08-07 DIAGNOSIS — D68.51 FACTOR V LEIDEN MUTATION (H): ICD-10-CM

## 2017-08-07 DIAGNOSIS — Z53.9 ERRONEOUS ENCOUNTER--DISREGARD: Primary | ICD-10-CM

## 2017-08-07 DIAGNOSIS — Z79.01 LONG-TERM (CURRENT) USE OF ANTICOAGULANTS: ICD-10-CM

## 2017-08-07 DIAGNOSIS — R79.82 CRP ELEVATED: ICD-10-CM

## 2017-08-07 DIAGNOSIS — Z86.718 PERSONAL HISTORY OF DVT (DEEP VEIN THROMBOSIS): ICD-10-CM

## 2017-08-07 LAB
CRP SERPL-MCNC: 5.4 MG/L (ref 0–8)
ERYTHROCYTE [DISTWIDTH] IN BLOOD BY AUTOMATED COUNT: 14.2 % (ref 10–15)
HCT VFR BLD AUTO: 37.2 % (ref 35–47)
HGB BLD-MCNC: 12.5 G/DL (ref 11.7–15.7)
INR POINT OF CARE: 2.1 (ref 0.86–1.14)
MCH RBC QN AUTO: 32.9 PG (ref 26.5–33)
MCHC RBC AUTO-ENTMCNC: 33.6 G/DL (ref 31.5–36.5)
MCV RBC AUTO: 98 FL (ref 78–100)
PLATELET # BLD AUTO: 333 10E9/L (ref 150–450)
RBC # BLD AUTO: 3.8 10E12/L (ref 3.8–5.2)
WBC # BLD AUTO: 7.4 10E9/L (ref 4–11)

## 2017-08-07 PROCEDURE — 85027 COMPLETE CBC AUTOMATED: CPT | Performed by: NURSE PRACTITIONER

## 2017-08-07 PROCEDURE — 36416 COLLJ CAPILLARY BLOOD SPEC: CPT

## 2017-08-07 PROCEDURE — 99207 ZZC NO CHARGE NURSE ONLY: CPT

## 2017-08-07 PROCEDURE — 86140 C-REACTIVE PROTEIN: CPT | Performed by: NURSE PRACTITIONER

## 2017-08-07 PROCEDURE — 85610 PROTHROMBIN TIME: CPT | Mod: QW

## 2017-08-07 NOTE — PROGRESS NOTES
ANTICOAGULATION FOLLOW-UP CLINIC VISIT    Patient Name:  Kathya Breaux  Date:  8/7/2017  Contact Type:  Face to Face    SUBJECTIVE:     Patient Findings     Positives No Problem Findings           OBJECTIVE    INR Protime   Date Value Ref Range Status   08/07/2017 2.1 (A) 0.86 - 1.14 Final       ASSESSMENT / PLAN  INR assessment THER    Recheck INR In: 6 WEEKS    INR Location Clinic      Anticoagulation Summary as of 8/7/2017     INR goal 1.5-2.0   Today's INR 2.1!   Maintenance plan 5 mg (5 mg x 1) on Mon, Wed, Fri; 2.5 mg (5 mg x 0.5) all other days   Full instructions 5 mg on Mon, Wed, Fri; 2.5 mg all other days   Weekly total 25 mg   No change documented Phyllis Patel RN   Plan last modified Landy Garcia RN (3/11/2016)   Next INR check 9/18/2017   Priority INR   Target end date     Indications   Long-term (current) use of anticoagulants [Z79.01] [Z79.01]  Personal history of DVT (deep vein thrombosis) [Z86.718]  Factor V Leiden Heterozygote [D68.51]         Anticoagulation Episode Summary     INR check location     Preferred lab     Send INR reminders to AN ANTICOAG CLINIC    Comments       Anticoagulation Care Providers     Provider Role Specialty Phone number    Azalia Daugherty MD [facility] [phone]            See the Encounter Report to view Anticoagulation Flowsheet and Dosing Calendar (Go to Encounters tab in chart review, and find the Anticoagulation Therapy Visit)    Dosage adjustment made based on physician directed care plan. Reviewed both bleeding and clotting signs and symptoms with patient this visit. Pt. has no further questions or concerns.  Will call with any changes to diet, medications, or missed doses at INR line [phone].      Phyllis Patel, RN

## 2017-08-07 NOTE — PROGRESS NOTES
Dear Kathya,    Your recent test results are attached.      Normal white blood cell count.  Normal platelets.    If you have any questions please feel free to contact [phone] or myself via tab [email or web address].    Sincerely,  Katlin Monreal, CNP

## 2017-08-07 NOTE — MR AVS SNAPSHOT
Kathya HO Saeid   8/7/2017 10:30 AM   Anticoagulation Therapy Visit    Description:  71 year old female   Provider:  [facility]   Department:  [facility]           INR as of 8/7/2017     Today's INR 2.1!      Anticoagulation Summary as of 8/7/2017     INR goal 1.5-2.0   Today's INR 2.1!   Full instructions 5 mg on Mon, Wed, Fri; 2.5 mg all other days   Next INR check 9/18/2017    Indications   Long-term (current) use of anticoagulants [Z79.01] [Z79.01]  Personal history of DVT (deep vein thrombosis) [Z86.718]  Factor V Leiden Heterozygote [D68.51]         Your next Anticoagulation Clinic appointment(s)     Sep 18, 2017 10:00 AM CDT   Anticoagulation Visit with [facility]   [facility] ([address] [address] [phone]              Contact Numbers     [facility]  Please call [phone] to cancel and/or reschedule your appointment   Please call [phone] with any problems or questions regarding your therapy.        August 2017 Details    Sun Mon Tue Wed Thu Fri Sat       1               2               3               4               5                 6               7      5 mg   See details      8      2.5 mg         9      5 mg         10      2.5 mg         11      5 mg         12      2.5 mg           13      2.5 mg         14      5 mg         15      2.5 mg         16      5 mg         17      2.5 mg         18      5 mg         19      2.5 mg           20      2.5 mg         21      5 mg         22      2.5 mg         23      5 mg         24      2.5 mg         25      5 mg         26      2.5 mg           27      2.5 mg         28      5 mg         29      2.5 mg         30      5 mg         31      2.5 mg            Date Details   08/07 This INR check               How to take your warfarin dose     To take:  2.5 mg Take 0.5 of a 5 mg tablet.    To take:  5 mg Take 1 of the 5 mg tablets.           September 2017 Details     Sun Mon Tue Wed Thu Fri Sat          1      5 mg         2      2.5 mg           3      2.5 mg         4      5 mg         5      2.5 mg         6      5 mg         7      2.5 mg         8      5 mg         9      2.5 mg           10      2.5 mg         11      5 mg         12      2.5 mg         13      5 mg         14      2.5 mg         15      5 mg         16      2.5 mg           17      2.5 mg         18            19               20               21               22               23                 24               25               26               27               28               29               30                Date Details   No additional details    Date of next INR:  9/18/2017         How to take your warfarin dose     To take:  2.5 mg Take 0.5 of a 5 mg tablet.    To take:  5 mg Take 1 of the 5 mg tablets.

## 2017-08-07 NOTE — PROGRESS NOTES
This patient has a lab only appointment on 8/7/2017 (today)  but health maintenance suggests additional future orders. Please review, associate diagnosis and sign pending lab orders for the upcoming appointment.  There are no future scheduled appointments with you at this time.      Thank you,    [facility]

## 2017-08-08 NOTE — PROGRESS NOTES
Dear Kathya,    Your recent test results are attached.      Normal marker of inflammation.    If you have any questions please feel free to contact [phone] or myself via Spotbrost.    Sincerely,  Katlin Monreal, CNP

## 2017-08-28 ENCOUNTER — OFFICE VISIT (OUTPATIENT)
Dept: OTOLARYNGOLOGY | Facility: CLINIC | Age: 72
End: 2017-08-28
Payer: COMMERCIAL

## 2017-08-28 VITALS
WEIGHT: 141 LBS | DIASTOLIC BLOOD PRESSURE: 59 MMHG | BODY MASS INDEX: 24.59 KG/M2 | TEMPERATURE: 98 F | SYSTOLIC BLOOD PRESSURE: 107 MMHG | OXYGEN SATURATION: 97 % | HEART RATE: 90 BPM

## 2017-08-28 DIAGNOSIS — H61.23 BILATERAL IMPACTED CERUMEN: Primary | ICD-10-CM

## 2017-08-28 DIAGNOSIS — H93.8X3 SENSATION OF FULLNESS IN BOTH EARS: ICD-10-CM

## 2017-08-28 PROCEDURE — 99213 OFFICE O/P EST LOW 20 MIN: CPT | Mod: 25 | Performed by: OTOLARYNGOLOGY

## 2017-08-28 PROCEDURE — 69210 REMOVE IMPACTED EAR WAX UNI: CPT | Performed by: OTOLARYNGOLOGY

## 2017-08-28 ASSESSMENT — PAIN SCALES - GENERAL: PAINLEVEL: NO PAIN (0)

## 2017-08-28 NOTE — NURSING NOTE
"Chief Complaint   Patient presents with     Ear Problem     Ears feel plugged       Initial /59  Pulse 90  Temp 98  F (36.7  C) (Oral)  Wt 141 lb (64 kg)  SpO2 97%  BMI 24.59 kg/m2 Estimated body mass index is 24.59 kg/(m^2) as calculated from the following:    Height as of 10/21/16: 5' 3.5\" (1.613 m).    Weight as of this encounter: 141 lb (64 kg).  Medication Reconciliation: complete   Ambreen Ba MA    "

## 2017-08-28 NOTE — PROGRESS NOTES
History of Present Illness - Kathya Breaux is a 71 year old female last seen on 8/22/2016, for procedural removal of severe cerumen impaction.  She is here for one year follow up and ear check.    She reports that her ears have progressively felt stuffy again, especially over the last month. However, no ear pain, no blood or purulence from the ears.  She denies any vertigo or headache, but the hearing is stuffy.    Past Medical History -   Patient Active Problem List   Diagnosis     Personal history of DVT (deep vein thrombosis)     Factor V Leiden Heterozygote     CARDIOVASCULAR SCREENING; LDL GOAL LESS THAN 130     Long-term (current) use of anticoagulants [Z79.01]     Posterior vitreous detachment, bilateral     Hx of LASIK       Current Medications -   Current Outpatient Prescriptions:      warfarin (COUMADIN) 5 MG tablet, Take 1 tab M,W,F. Take 1/2 tablet the rest of the week or as directed, Disp: 90 tablet, Rfl: 3     VITAMIN-B COMPLEX OR TABS, 1 tablet daily, Disp: , Rfl:      OMEGA 3-6-9 FATTY ACIDS OR, 1 TABLET DAILY, Disp: , Rfl:      IMURAN 50 MG OR TABS, 1 TABLET DAILY, Disp: , Rfl:      OMEPRAZOLE 20 MG OR CPDR, 1 CAPSULE DAILY, Disp: , Rfl:      VITAMIN B-12 1000 MCG/ML IJ SOLN, 1000 MCG Monthly, Disp: , Rfl: 0     CALCIUM 500 + D 500-200 MG-IU OR TABS, 1 TABLET DAILY, Disp: , Rfl: 0     MULTI-VITAMIN OR TABS, 1 TABLET DAILY, Disp: , Rfl: 0    Allergies -   Allergies   Allergen Reactions     Penicillins Hives     Throat closed       Social History -   Social History     Social History     Marital status:      Spouse name: N/A     Number of children: N/A     Years of education: N/A     Social History Main Topics     Smoking status: Former Smoker     Packs/day: 0.50     Years: 38.00     Quit date: 3/1/2008     Smokeless tobacco: Never Used      Comment: 6 cigs per day     Alcohol use No     Drug use: No     Sexual activity: Yes     Partners: Male     Other Topics Concern     Not on file      Social History Narrative    Caffeine intake/servings daily - 2-3    Calcium intake/servings daily - 1-2+ plus 1000mg calcium supp    Exercise 0 times weekly - describe    Sunscreen used - Yes    Seatbelts used - Yes    Guns stored in the home - No    Self Breast Exam - Yes    Pap test up to date -  Yes     Eye exam up to date -  Yes    Dental exam up to date -  Yes    DEXA scan up to date -  2005    Flex Sig/Colonoscopy up to date -  Yes 2006    Mammography up to date -  Yes 4/2006    Immunizations reviewed and up to date - Yes 1998    Abuse: Current or Past (Physical, Sexual or Emotional) - No    Do you feel safe in your environment - Yes    Do you cope well with stress - Yes    Do you suffer from insomnia - No    Last updated by: Char Strauss 3/8/2007       Family History -   Family History   Problem Relation Age of Onset     HEART DISEASE Father      dysrythmia's     Glaucoma Father      HEART DISEASE Son      clogged arteries     Cardiovascular Son      sudden heart attack     Circulatory Mother      phelbities     Unknown/Adopted Maternal Grandmother      Gynecology Maternal Grandfather      CEREBROVASCULAR DISEASE Paternal Grandmother      CEREBROVASCULAR DISEASE Paternal Grandfather      Alcohol/Drug Brother      alcohol and drugs     Alcohol/Drug Sister      alcohol and drugs       Review of Systems - As per HPI and PMHx, otherwise 10+ system review of the head and neck, and general constitution is negative.    Physical Exam  /59  Pulse 90  Temp 98  F (36.7  C) (Oral)  Wt 141 lb (64 kg)  SpO2 97%  BMI 24.59 kg/m2    General - The patient is well nourished and well developed, and appears to have good nutritional status.  Alert and oriented to person and place, answers questions and cooperates with examination appropriately.   Head and Face - Normocephalic and atraumatic, with no gross asymmetry noted of the contour of the facial features.  The facial nerve is intact, with strong symmetric  movements.  Voice and Breathing - The patient was breathing comfortably without the use of accessory muscles. There was no wheezing, stridor, or stertor.  The patients voice was clear and strong, and had appropriate pitch and quality.  Eyes - Extraocular movements intact, and the pupils were reactive to light.  Sclera were not icteric or injected, conjunctiva were pink and moist.  Mouth - Examination of the oral cavity showed pink, healthy oral mucosa. No lesions or ulcerations noted.  The tongue was mobile and midline, and the dentition were in good condition.    Throat - The walls of the oropharynx were smooth, pink, moist, symmetric, and had no lesions or ulcerations.  The tonsillar pillars and soft palate were symmetric.  The uvula was midline on elevation.    Neck - Normal midline excursion of the laryngotracheal complex during swallowing.  Full range of motion on passive movement.  Palpation of the occipital, submental, submandibular, internal jugular chain, and supraclavicular nodes did not demonstrate any abnormal lymph nodes or masses.  The carotid pulse was palpable bilaterally.  Palpation of the thyroid was soft and smooth, with no nodules or goiter appreciated.  The trachea was mobile and midline.  Nose - External contour is symmetric, no gross deflection or scars.  Nasal mucosa is pink and moist with no abnormal mucus.  The septum was midline and non-obstructive, turbinates of normal size and position.  No polyps, masses, or purulence noted on examination.    Cerumen Removal    Physical Exam and Procedure  Ears - On examination of the ears, I found that the BOTH ears were completely impacted with dense cerumen.  Therefore, I positioned them in the examination chair in a semi-supine position, beginning with the right side.  I used the binocular surgical microscope to perform cerumen removal.  I began by using a cerumen loop to gently lift the edges of the cerumen mass away from the walls of the external  canal.  Once I did this, I was able to suction away fragments of wax and debris using suction.  Once the mass was loose enough, the entire plug was pulled from the canal.  The tympanic membrane was intact, no sign of perforation or middle ear effusion.    I turned my attention to the contralateral side once again using the binocular surgical microscope to perform cerumen removal.  I began by using a cerumen loop to gently lift the edges of the cerumen mass away from the walls of the external canal.  Once I did this, I was able to suction away fragments of wax and debris using suction.  Once the mass was loose enough, the entire plug was pulled from the canal.  The tympanic membrane was intact, no sign of perforation or middle ear effusion.      A/P - Kathya Breaux is a 71 year old female  (H61.23) Bilateral impacted cerumen  (primary encounter diagnosis)  (H93.8X3) Sensation of fullness in both ears    The patient has had their cerumen procedurally removed today.  I have discussed ear care at home, including avoiding qtips or any other object placed in the canal.  I have also discussed that over the counter cerumen kits like Debrox or Cerumenex can be useful.    If no other issues, follow up with me in one year for an ear check.

## 2017-08-28 NOTE — MR AVS SNAPSHOT
After Visit Summary   8/28/2017    Kathya Breaux    MRN: [id number]           Patient Information     Date Of Birth          1945        Visit Information        Provider Department      8/28/2017 1:15 PM Oscar Moore MD [facility]        Today's Diagnoses     Bilateral impacted cerumen    -  1    Sensation of fullness in both ears           Follow-ups after your visit        Your next 10 appointments already scheduled     Sep 19, 2017 10:45 AM CDT   Anticoagulation Visit with [facility]   [facility] ([facility])    [address] [address] [phone]              Who to contact     If you have questions or need follow up information about today's clinic visit or your schedule please contact [facility] directly at [phone].  Normal or non-critical lab and imaging results will be communicated to you by MyChart, letter or phone within 4 business days after the clinic has received the results. If you do not hear from us within 7 days, please contact the clinic through MyChart or phone. If you have a critical or abnormal lab result, we will notify you by phone as soon as possible.  Submit refill requests through [facility] or call your pharmacy and they will forward the refill request to us. Please allow 3 business days for your refill to be completed.          Additional Information About Your Visit        MyChart Information     [facility] gives you secure access to your electronic health record. If you see a primary care provider, you can also send messages to your care team and make appointments. If you have questions, please call your primary care clinic.  If you do not have a primary care provider, please call [phone] and they will assist you.        Care EveryWhere ID     This is your Care EveryWhere ID. This could be used by other organizations to access your [facility]  records  [id number]        Your Vitals Were     Pulse Temperature Pulse Oximetry BMI (Body Mass Index)          90 98  F (36.7  C) (Oral) 97% 24.59 kg/m2         Blood Pressure from Last 3 Encounters:   08/28/17 107/59   07/31/17 98/68   10/21/16 124/84    Weight from Last 3 Encounters:   08/28/17 141 lb (64 kg)   07/31/17 138 lb 6.4 oz (62.8 kg)   10/21/16 137 lb 3.2 oz (62.2 kg)              We Performed the Following     Remove [facility]        Primary Care Provider Office Phone # Fax #    Azalia Daugherty [phone]       [address] [address]        Equal Access to Services     FERMÍN KAMINSKI : Hadii aad ku hadasho Soomaali, waaxda luqadaha, qaybta kaalmada adeegyada, nika marmolejo . So [facility] [phone].    ATENCIÓN: Si habla español, tiene a parrish disposición servicios gratuitos de asistencia lingüística. Mark al [phone].    We comply with applicable federal civil rights laws and [place] laws. We do not discriminate on the basis of race, color, national origin, age, disability sex, sexual orientation or gender identity.            Thank you!     Thank you for choosing [facility]  for your care. Our goal is always to provide you with excellent care. Hearing back from our patients is one way we can continue to improve our services. Please take a few minutes to complete the written survey that you may receive in the mail after your visit with us. Thank you!             Your Updated Medication List - Protect others around you: Learn how to safely use, store and throw away your medicines at www.disposemymeds.org.          This list is accurate as of: 8/28/17  1:26 PM.  Always use your most recent med list.                   Brand Name Dispense Instructions for use Diagnosis    CALCIUM 500 + D 500-200 MG-IU Tabs      1 TABLET DAILY        IMURAN 50 MG tablet   Generic drug:  azaTHIOprine      1 TABLET  DAILY    Routine gynecological examination       Multi-vitamin Tabs tablet   Generic drug:  multivitamin, therapeutic with minerals      1 TABLET DAILY        OMEGA 3-6-9 FATTY ACIDS PO      1 TABLET DAILY        omeprazole 20 MG CR capsule    priLOSEC     1 CAPSULE DAILY    Routine gynecological examination       Vitamin B-12 1000 MCG/ML Soln      1000 MCG Monthly    Routine gynecological examination       Vitamin-B Complex Tabs      1 tablet daily        warfarin 5 MG tablet    COUMADIN    90 tablet    Take 1 tab M,W,F. Take 1/2 tablet the rest of the week or as directed    Personal history of venous thrombosis and embolism, Primary hypercoagulable state (H)

## 2017-09-22 ENCOUNTER — ANTICOAGULATION THERAPY VISIT (OUTPATIENT)
Dept: NURSING | Facility: CLINIC | Age: 72
End: 2017-09-22
Payer: COMMERCIAL

## 2017-09-22 ENCOUNTER — TELEPHONE (OUTPATIENT)
Dept: FAMILY MEDICINE | Facility: CLINIC | Age: 72
End: 2017-09-22

## 2017-09-22 DIAGNOSIS — Z86.718 PERSONAL HISTORY OF DVT (DEEP VEIN THROMBOSIS): ICD-10-CM

## 2017-09-22 DIAGNOSIS — D68.51 FACTOR V LEIDEN MUTATION (H): ICD-10-CM

## 2017-09-22 DIAGNOSIS — D68.51 FACTOR V LEIDEN MUTATION (H): Primary | ICD-10-CM

## 2017-09-22 DIAGNOSIS — Z79.01 LONG-TERM (CURRENT) USE OF ANTICOAGULANTS: ICD-10-CM

## 2017-09-22 LAB — INR POINT OF CARE: 1.9 (ref 0.86–1.14)

## 2017-09-22 PROCEDURE — 85610 PROTHROMBIN TIME: CPT | Mod: QW

## 2017-09-22 PROCEDURE — 99207 ZZC NO CHARGE NURSE ONLY: CPT

## 2017-09-22 PROCEDURE — 36416 COLLJ CAPILLARY BLOOD SPEC: CPT

## 2017-09-22 NOTE — PROGRESS NOTES
ANTICOAGULATION FOLLOW-UP CLINIC VISIT    Patient Name:  Kathya Breaux  Date:  9/22/2017  Contact Type:  Face to Face    SUBJECTIVE:     Patient Findings     Positives No Problem Findings           OBJECTIVE    INR Protime   Date Value Ref Range Status   09/22/2017 1.9 (A) 0.86 - 1.14 Final       ASSESSMENT / PLAN  No question data found.  Anticoagulation Summary as of 9/22/2017     INR goal 1.5-2.0   Today's INR 1.9   Maintenance plan 5 mg (5 mg x 1) on Mon, Wed, Fri; 2.5 mg (5 mg x 0.5) all other days   Full instructions 5 mg on Mon, Wed, Fri; 2.5 mg all other days   Weekly total 25 mg   No change documented Emilie José, RN   Plan last modified Landy Garcia RN (3/11/2016)   Next INR check 11/3/2017   Priority INR   Target end date     Indications   Long-term (current) use of anticoagulants [Z79.01] [Z79.01]  Personal history of DVT (deep vein thrombosis) [Z86.718]  Factor V Leiden Heterozygote [D68.51]         Anticoagulation Episode Summary     INR check location     Preferred lab     Send INR reminders to [facility]    Comments       Anticoagulation Care Providers     Provider Role Specialty Phone number    Azalia Daugherty MD [facility] [phone]            See the Encounter Report to view Anticoagulation Flowsheet and Dosing Calendar (Go to Encounters tab in chart review, and find the Anticoagulation Therapy Visit)    Dosage adjustment made based on physician directed care plan.    Emilie José RN

## 2017-09-22 NOTE — MR AVS SNAPSHOT
Kathyadean Breaux   9/22/2017 1:00 PM   Anticoagulation Therapy Visit    Description:  71 year old female   Provider:  [facility]   Department:  [facility]           INR as of 9/22/2017     Today's INR 1.9      Anticoagulation Summary as of 9/22/2017     INR goal 1.5-2.0   Today's INR 1.9   Full instructions 5 mg on Mon, Wed, Fri; 2.5 mg all other days   Next INR check 11/3/2017    Indications   Long-term (current) use of anticoagulants [Z79.01] [Z79.01]  Personal history of DVT (deep vein thrombosis) [Z86.718]  Factor V Leiden Heterozygote [D68.51]         Your next Anticoagulation Clinic appointment(s)     Sep 22, 2017  1:00 PM CDT   Anticoagulation Visit with [facility]   [facility]    [address] [place] [address] [phone]            Nov 03, 2017 10:00 AM CDT   Anticoagulation Visit with [facility]   [facility] ([address] [address] [phone]              Contact Numbers     [facility]  Please call [phone] to cancel and/or reschedule your appointment   Please call [phone] with any problems or questions regarding your therapy.        September 2017 Details    Sun Mon Tue Wed Thu Fri Sat          1               2                 3               4               5               6               7               8               9                 10               11               12               13               14               15               16                 17               18               19               20               21               22      5 mg   See details      23      2.5 mg           24      2.5 mg         25      5 mg         26      2.5 mg         27      5 mg         28      2.5 mg         29      5 mg         30      2.5 mg          Date Details   09/22 This INR check               How to take your warfarin dose     To take:  2.5 mg Take  0.5 of a 5 mg tablet.    To take:  5 mg Take 1 of the 5 mg tablets.           October 2017 Details    Sun Mon Tue Wed Thu Fri Sat     1      2.5 mg         2      5 mg         3      2.5 mg         4      5 mg         5      2.5 mg         6      5 mg         7      2.5 mg           8      2.5 mg         9      5 mg         10      2.5 mg         11      5 mg         12      2.5 mg         13      5 mg         14      2.5 mg           15      2.5 mg         16      5 mg         17      2.5 mg         18      5 mg         19      2.5 mg         20      5 mg         21      2.5 mg           22      2.5 mg         23      5 mg         24      2.5 mg         25      5 mg         26      2.5 mg         27      5 mg         28      2.5 mg           29      2.5 mg         30      5 mg         31      2.5 mg              Date Details   No additional details            How to take your warfarin dose     To take:  2.5 mg Take 0.5 of a 5 mg tablet.    To take:  5 mg Take 1 of the 5 mg tablets.           November 2017 Details    Sun Mon Tue Wed Thu Fri Sat        1      5 mg         2      2.5 mg         3            4                 5               6               7               8               9               10               11                 12               13               14               15               16               17               18                 19               20               21               22               23               24               25                 26               27               28               29               30                  Date Details   No additional details    Date of next INR:  11/3/2017         How to take your warfarin dose     To take:  2.5 mg Take 0.5 of a 5 mg tablet.    To take:  5 mg Take 1 of the 5 mg tablets.

## 2017-09-26 NOTE — PATIENT INSTRUCTIONS
Preventive Health Recommendations  Female Ages 65 +    Yearly exam:     See your health care provider every year in order to  o Review health changes.   o Discuss preventive care.    o Review your medicines if your doctor has prescribed any.      You no longer need a yearly Pap test unless you've had an abnormal Pap test in the past 10 years. If you have vaginal symptoms, such as bleeding or discharge, be sure to talk with your provider about a Pap test.      Every 1 to 2 years, have a mammogram.  If you are over 69, talk with your health care provider about whether or not you want to continue having screening mammograms.      Every 10 years, have a colonoscopy. Or, have a yearly FIT test (stool test). These exams will check for colon cancer.       Have a cholesterol test every 5 years, or more often if your doctor advises it.       Have a diabetes test (fasting glucose) every three years. If you are at risk for diabetes, you should have this test more often.       At age 65, have a bone density scan (DEXA) to check for osteoporosis (brittle bone disease).    Shots:    Get a flu shot each year.    Get a tetanus shot every 10 years.    Talk to your doctor about your pneumonia vaccines. There are now two you should receive - Pneumovax (PPSV 23) and Prevnar (PCV 13).    Talk to your doctor about the shingles vaccine.    Talk to your doctor about the hepatitis B vaccine.    Nutrition:     Eat at least 5 servings of fruits and vegetables each day.      Eat whole-grain bread, whole-wheat pasta and brown rice instead of white grains and rice.      Talk to your provider about Calcium and Vitamin D.     Lifestyle    Exercise at least 150 minutes a week (30 minutes a day, 5 days a week). This will help you control your weight and prevent disease.      Limit alcohol to one drink per day.      No smoking.       Wear sunscreen to prevent skin cancer.       See your dentist twice a year for an exam and cleaning.      See your  eye doctor every 1 to 2 years to screen for conditions such as glaucoma, macular degeneration, cataracts, etc     [facility]    If you have any questions regarding to your visit please contact your care team:       Team Purple:   Clinic Hours Telephone Number   Dr. Azalia Marc     7am-7pm  Monday - Thursday   7am-5pm  Fridays  [phone]  (Appointment scheduling available 24/7)    Questions about your Visit?   Team Line:  [phone]   Urgent Care - [place] - 11am-9pm Monday-Friday Saturday-Sunday- 9am-5pm   [place] - 5pm-9pm Monday-Friday Saturday-Sunday- 9am-5pm  [phone] - [facility]  [phone] - [place]       What options do I have for visits at the clinic other than the traditional office visit?  To expand how we care for you, many of our providers are utilizing electronic visits (e-visits) and telephone visits, when medically appropriate, for interactions with their patients rather than a visit in the clinic.   We also offer nurse visits for many medical concerns. Just like any other service, we will bill your insurance company for this type of visit based on time spent on the phone with your provider. Not all insurance companies cover these visits. Please check with your medical insurance if this type of visit is covered. You will be responsible for any charges that are not paid by your insurance.      E-visits via PAK:  generally incur a $35.00 fee.  Telephone visits:  Time spent on the phone: *charged based on time that is spent on the phone in increments of 10 minutes. Estimated cost:   5-10 mins $30.00   11-20 mins. $59.00   21-30 mins. $85.00     Use [employer] (secure email communication and access to your chart) to send your primary care provider a message or make an appointment. Ask someone on your Team how to sign up for PAK.  For a Price Quote for your services, please call our Consumer Price  Line at [phone].  As always, Thank you for trusting us with your health care needs!

## 2017-10-02 ENCOUNTER — TRANSFERRED RECORDS (OUTPATIENT)
Dept: HEALTH INFORMATION MANAGEMENT | Facility: CLINIC | Age: 72
End: 2017-10-02

## 2017-10-03 ENCOUNTER — OFFICE VISIT (OUTPATIENT)
Dept: FAMILY MEDICINE | Facility: CLINIC | Age: 72
End: 2017-10-03
Payer: COMMERCIAL

## 2017-10-03 VITALS
HEIGHT: 64 IN | DIASTOLIC BLOOD PRESSURE: 72 MMHG | OXYGEN SATURATION: 98 % | TEMPERATURE: 97.4 F | SYSTOLIC BLOOD PRESSURE: 122 MMHG | BODY MASS INDEX: 23.64 KG/M2 | HEART RATE: 100 BPM | WEIGHT: 138.5 LBS

## 2017-10-03 DIAGNOSIS — Z00.00 ENCOUNTER FOR ROUTINE ADULT HEALTH EXAMINATION WITHOUT ABNORMAL FINDINGS: Primary | ICD-10-CM

## 2017-10-03 DIAGNOSIS — K50.818 CROHN'S DISEASE OF BOTH SMALL AND LARGE INTESTINE WITH OTHER COMPLICATION (H): ICD-10-CM

## 2017-10-03 DIAGNOSIS — Z13.6 CARDIOVASCULAR SCREENING; LDL GOAL LESS THAN 130: ICD-10-CM

## 2017-10-03 DIAGNOSIS — Z11.59 NEED FOR HEPATITIS C SCREENING TEST: ICD-10-CM

## 2017-10-03 DIAGNOSIS — Z12.31 VISIT FOR SCREENING MAMMOGRAM: ICD-10-CM

## 2017-10-03 DIAGNOSIS — Z23 NEED FOR PROPHYLACTIC VACCINATION AGAINST STREPTOCOCCUS PNEUMONIAE (PNEUMOCOCCUS): ICD-10-CM

## 2017-10-03 DIAGNOSIS — Z86.718 PERSONAL HISTORY OF VENOUS THROMBOSIS AND EMBOLISM: ICD-10-CM

## 2017-10-03 DIAGNOSIS — Z13.1 SCREENING FOR DIABETES MELLITUS: ICD-10-CM

## 2017-10-03 DIAGNOSIS — D68.59 PRIMARY HYPERCOAGULABLE STATE (H): ICD-10-CM

## 2017-10-03 PROCEDURE — G0009 ADMIN PNEUMOCOCCAL VACCINE: HCPCS | Performed by: FAMILY MEDICINE

## 2017-10-03 PROCEDURE — 99207 C PAF COMPLETED  NO CHARGE: CPT | Performed by: FAMILY MEDICINE

## 2017-10-03 PROCEDURE — G0438 PPPS, INITIAL VISIT: HCPCS | Performed by: FAMILY MEDICINE

## 2017-10-03 PROCEDURE — 90670 PCV13 VACCINE IM: CPT | Performed by: FAMILY MEDICINE

## 2017-10-03 RX ORDER — WARFARIN SODIUM 5 MG/1
TABLET ORAL
Qty: 90 TABLET | Refills: 3 | Status: SHIPPED | OUTPATIENT
Start: 2017-10-03 | End: 2018-10-22

## 2017-10-03 NOTE — PROGRESS NOTES
SUBJECTIVE:   Kathya Breaux is a 71 year old female who presents for Preventive Visit.      Are you in the first 12 months of your Medicare coverage?  No    Physical   Annual:     Getting at least 3 servings of Calcium per day::  Yes    Bi-annual eye exam::  Yes    Dental care twice a year::  Yes    Sleep apnea or symptoms of sleep apnea::  None    Diet::  Regular (no restrictions)    Frequency of exercise::  2-3 days/week    Duration of exercise::  15-30 minutes    Taking medications regularly::  Yes    Medication side effects::  None    Additional concerns today::  No      COGNITIVE SCREEN  1) Repeat 3 items (Banana, Sunrise, Chair)    2) Clock draw: NORMAL  3) 3 item recall: Recalls 3 objects  Results: 3 items recalled: COGNITIVE IMPAIRMENT LESS LIKELY    Mini-CogTM Copyright S Janae. Licensed by the author for use in [facility]; reprinted with permission (jaiden@[facility]). All rights reserved.          Reviewed and updated as needed this visit by clinical staff  Tobacco  Allergies  Meds  Problems  Med Hx  Surg Hx  Fam Hx  Soc Hx          Reviewed and updated as needed this visit by Provider  Allergies  Meds  Problems        Social History   Substance Use Topics     Smoking status: Former Smoker     Packs/day: 0.50     Years: 38.00     Quit date: 3/1/2008     Smokeless tobacco: Never Used      Comment: 6 cigs per day     Alcohol use No       The patient does not drink >3 drinks per day nor >7 drinks per week.               Today's PHQ-2 Score:   PHQ-2 ( 1999 Pfizer) 9/30/2017   Q1: Little interest or pleasure in doing things 0   Q2: Feeling down, depressed or hopeless 0   PHQ-2 Score 0   Q1: Little interest or pleasure in doing things Not at all   Q2: Feeling down, depressed or hopeless Not at all   PHQ-2 Score 0       Do you feel safe in your environment - Yes    Do you have a Health Care Directive?: No: Advance care planning was reviewed with patient; patient declined at this  "time.      Current providers sharing in care for this patient include: Patient Care Team:  Azalia Daugherty MD as PCP - General (Family Practice)      Hearing impairment: No    Ability to successfully perform activities of daily living: Yes, no assistance needed     Fall risk:  Fallen 2 or more times in the past year?: No  Any fall with injury in the past year?: No      Home safety:  none identified      The following health maintenance items are reviewed in Epic and correct as of today:  Health Maintenance   Topic Date Due     HEPATITIS C SCREENING  11/27/1963     LIPID SCREEN Q5 YR FEMALE (SYSTEM ASSIGNED)  11/27/1990     ADVANCE DIRECTIVE PLANNING Q5 YRS  11/27/2000     PNEUMOCOCCAL (2 of 2 - PCV13) 05/31/2014     OP ANNUAL INR REFERRAL  02/24/2015     FALL RISK ASSESSMENT  10/21/2017     MAMMO Q1 YR  10/25/2017     COLONOSCOPY Q3 YR  05/12/2019     TETANUS IMMUNIZATION (SYSTEM ASSIGNED)  05/31/2023     INFLUENZA VACCINE (SYSTEM ASSIGNED)  Completed     DEXA SCAN SCREENING (SYSTEM ASSIGNED)  Completed     Labs reviewed in EPIC  BP Readings from Last 3 Encounters:   10/03/17 122/72   08/28/17 107/59   07/31/17 98/68    Wt Readings from Last 3 Encounters:   10/03/17 138 lb 8 oz (62.8 kg)   08/28/17 141 lb (64 kg)   07/31/17 138 lb 6.4 oz (62.8 kg)                  Patient Active Problem List   Diagnosis     Personal history of DVT (deep vein thrombosis)     Factor V Leiden Heterozygote     CARDIOVASCULAR SCREENING; LDL GOAL LESS THAN 130     Long-term (current) use of anticoagulants [Z79.01]     Posterior vitreous detachment, bilateral     Hx of LASIK     Crohn's disease of both small and large intestine with other complication (H)     Past Surgical History:   Procedure Laterality Date     C NONSPECIFIC PROCEDURE  1964    3 1/2\" sm. intestine removed-illeitis     C NONSPECIFIC PROCEDURE  1969    4\" sm. intestine removed-illeitis     C NONSPECIFIC PROCEDURE  1980    Hysterectomy     COLONOSCOPY  5/2016    Q 5 " years      ENHANCE LASER REFRACTIVE BILATERAL EXISTING PT IN PARAMETERS         Social History   Substance Use Topics     Smoking status: Former Smoker     Packs/day: 0.50     Years: 38.00     Quit date: 3/1/2008     Smokeless tobacco: Never Used      Comment: 6 cigs per day     Alcohol use No     Family History   Problem Relation Age of Onset     HEART DISEASE Father      dysrythmia's     Glaucoma Father      HEART DISEASE Son      clogged arteries     Cardiovascular Son      sudden heart attack     Circulatory Mother      phelbities     Unknown/Adopted Maternal Grandmother      Gynecology Maternal Grandfather      CEREBROVASCULAR DISEASE Paternal Grandmother      CEREBROVASCULAR DISEASE Paternal Grandfather      Alcohol/Drug Brother      alcohol and drugs     Alcohol/Drug Sister      alcohol and drugs         Current Outpatient Prescriptions   Medication Sig Dispense Refill     warfarin (COUMADIN) 5 MG tablet Take 1 tab M,W,F. Take 1/2 tablet the rest of the week or as directed 90 tablet 3     VITAMIN-B COMPLEX OR TABS 1 tablet daily       OMEGA 3-6-9 FATTY ACIDS OR 1 TABLET DAILY       IMURAN 50 MG OR TABS 1 TABLET DAILY       OMEPRAZOLE 20 MG OR CPDR 1 CAPSULE DAILY       VITAMIN B-12 1000 MCG/ML IJ SOLN 1000 MCG Monthly  0     CALCIUM 500 + D 500-200 MG-IU OR TABS 1 TABLET DAILY  0     MULTI-VITAMIN OR TABS 1 TABLET DAILY  0     [DISCONTINUED] warfarin (COUMADIN) 5 MG tablet Take 1 tab M,W,F. Take 1/2 tablet the rest of the week or as directed 90 tablet 3     Allergies   Allergen Reactions     Penicillins Hives     Throat closed              Immunization History   Administered Date(s) Administered     Influenza (High Dose) 3 valent vaccine 10/21/2016, 10/02/2017     Pneumococcal (PCV 13) 10/03/2017     Pneumococcal 23 valent 05/31/2013     TDAP Vaccine (Adacel) 05/31/2013        ROS:  This 71 year old female is here today for annual female exam. She loves to travel. She and her sister were in [place] this summer  "for 2 weeks visiting relatives. She always travels wearing compression socks due to her history of DVT. She has been on coumadin for about 5 years and would be interested in doing home INR testing. She has very stable crohn's disease and is grateful for that as she has had colon resection surgeries in the past when her crohn's was severe. All other review of systems are negative  Personal, family, and social history reviewed with patient and revised.    C: NEGATIVE for fever, chills, change in weight  I: NEGATIVE for worrisome rashes, moles or lesions  E: NEGATIVE for vision changes or irritation  E/M: NEGATIVE for ear, mouth and throat problems  R: NEGATIVE for significant cough or SOB  B: NEGATIVE for masses, tenderness or discharge  CV: NEGATIVE for chest pain, palpitations or peripheral edema  GI: NEGATIVE for nausea, abdominal pain, heartburn, or change in bowel habits  : NEGATIVE for frequency, dysuria, or hematuria  M: NEGATIVE for significant arthralgias or myalgia  N: NEGATIVE for weakness, dizziness or paresthesias  E: NEGATIVE for temperature intolerance, skin/hair changes  H: NEGATIVE for bleeding problems  P: NEGATIVE for changes in mood or affect    OBJECTIVE:   /72 (BP Location: Right arm, Patient Position: Chair, Cuff Size: Adult Regular)  Pulse 100  Temp 97.4  F (36.3  C)  Ht 5' 3.5\" (1.613 m)  Wt 138 lb 8 oz (62.8 kg)  SpO2 98%  BMI 24.15 kg/m2 Estimated body mass index is 24.15 kg/(m^2) as calculated from the following:    Height as of this encounter: 5' 3.5\" (1.613 m).    Weight as of this encounter: 138 lb 8 oz (62.8 kg).  EXAM:   GENERAL APPEARANCE: healthy, alert and no distress  EYES: Eyes grossly normal to inspection, PERRL and conjunctivae and sclerae normal  HENT: ear canals and TM's normal, nose and mouth without ulcers or lesions, oropharynx clear and oral mucous membranes moist  NECK: no adenopathy, no asymmetry, masses, or scars and thyroid normal to palpation  RESP: " lungs clear to auscultation - no rales, rhonchi or wheezes  BREAST: normal without masses, tenderness or nipple discharge and no palpable axillary masses or adenopathy  CV: regular rate and rhythm, normal S1 S2, no S3 or S4, no murmur, click or rub, no peripheral edema and peripheral pulses strong  ABDOMEN: soft, nontender, no hepatosplenomegaly, no masses and bowel sounds normal, large midline scar from previous colon resection surgeries.   MS: no musculoskeletal defects are noted and gait is age appropriate without ataxia  SKIN: no suspicious lesions or rashes  NEURO: Normal strength and tone, sensory exam grossly normal, mentation intact and speech normal  PSYCH: mentation appears normal and affect normal/bright    ASSESSMENT / PLAN:   1. Encounter for routine adult health examination without abnormal findings  Healthy lady     2. Personal history of venous thrombosis and embolism  As above   - warfarin (COUMADIN) 5 MG tablet; Take 1 tab M,W,F. Take 1/2 tablet the rest of the week or as directed  Dispense: 90 tablet; Refill: 3    3. Primary hypercoagulable state (H)  Good control   - warfarin (COUMADIN) 5 MG tablet; Take 1 tab M,W,F. Take 1/2 tablet the rest of the week or as directed  Dispense: 90 tablet; Refill: 3    4. Crohn's disease of both small and large intestine with other complication (H)  Good control per GI    5. Visit for screening mammogram  due  - MA SCREENING DIGITAL BILAT - Future  (s+30); Future    6. Need for prophylactic vaccination against Streptococcus pneumoniae (pneumococcus)  due  - PNEUMOCOCCAL CONJ VACCINE 13 VALENT IM    7. Need for hepatitis C screening test  due  - Hepatitis C Screen Reflex to HCV RNA Quant and Genotype; Future    8. Screening for diabetes mellitus  due  - Glucose; Future    9. CARDIOVASCULAR SCREENING; LDL GOAL LESS THAN 130  due  - Lipid panel reflex to direct LDL; Future    End of Life Planning:  Patient currently has an advanced directive: No.  I have verified  "the patient's ablity to prepare an advanced directive/make health care decisions.  Literature was provided to assist patient in preparing an advanced directive.    COUNSELING:  Reviewed preventive health counseling, as reflected in patient instructions       Regular exercise       Healthy diet/nutrition        Estimated body mass index is 24.15 kg/(m^2) as calculated from the following:    Height as of this encounter: 5' 3.5\" (1.613 m).    Weight as of this encounter: 138 lb 8 oz (62.8 kg).     reports that she quit smoking about 9 years ago. She has a 19.00 pack-year smoking history. She has never used smokeless tobacco.        Appropriate preventive services were discussed with this patient, including applicable screening as appropriate for cardiovascular disease, diabetes, osteopenia/osteoporosis, and glaucoma.  As appropriate for age/gender, discussed screening for colorectal cancer, prostate cancer, breast cancer, and cervical cancer. Checklist reviewing preventive services available has been given to the patient.    Reviewed patients plan of care and provided an AVS. The Basic Care Plan (routine screening as documented in Health Maintenance) for Kathya meets the Care Plan requirement. This Care Plan has been established and reviewed with the Patient.    Counseling Resources:  ATP IV Guidelines  Pooled Cohorts Equation Calculator  Breast Cancer Risk Calculator  FRAX Risk Assessment  ICSI Preventive Guidelines  Dietary Guidelines for Americans, 2010  USDA's MyPlate  ASA Prophylaxis  Lung CA Screening    ZENOBIA MULLEN MD  [facility]  "

## 2017-10-03 NOTE — MR AVS SNAPSHOT
After Visit Summary   10/3/2017    Kathya Breaux    MRN: [id number]           Patient Information     Date Of Birth          1945        Visit Information        Provider Department      10/3/2017 10:30 AM Azalia Daugherty MD [facility]        Today's Diagnoses     Encounter for routine adult health examination without abnormal findings    -  1    Personal history of venous thrombosis and embolism        Primary hypercoagulable state (H)        Crohn's disease of both small and large intestine with other complication (H)        Visit for screening mammogram        Need for prophylactic vaccination against Streptococcus pneumoniae (pneumococcus)        Need for hepatitis C screening test        Screening for diabetes mellitus        CARDIOVASCULAR SCREENING; LDL GOAL LESS THAN 130          Care Instructions      Preventive Health Recommendations  Female Ages 65 +    Yearly exam:     See your health care provider every year in order to  o Review health changes.   o Discuss preventive care.    o Review your medicines if your doctor has prescribed any.      You no longer need a yearly Pap test unless you've had an abnormal Pap test in the past 10 years. If you have vaginal symptoms, such as bleeding or discharge, be sure to talk with your provider about a Pap test.      Every 1 to 2 years, have a mammogram.  If you are over 69, talk with your health care provider about whether or not you want to continue having screening mammograms.      Every 10 years, have a colonoscopy. Or, have a yearly FIT test (stool test). These exams will check for colon cancer.       Have a cholesterol test every 5 years, or more often if your doctor advises it.       Have a diabetes test (fasting glucose) every three years. If you are at risk for diabetes, you should have this test more often.       At age 65, have a bone density scan (DEXA) to check for osteoporosis (brittle bone  disease).    Shots:    Get a flu shot each year.    Get a tetanus shot every 10 years.    Talk to your doctor about your pneumonia vaccines. There are now two you should receive - Pneumovax (PPSV 23) and Prevnar (PCV 13).    Talk to your doctor about the shingles vaccine.    Talk to your doctor about the hepatitis B vaccine.    Nutrition:     Eat at least 5 servings of fruits and vegetables each day.      Eat whole-grain bread, whole-wheat pasta and brown rice instead of white grains and rice.      Talk to your provider about Calcium and Vitamin D.     Lifestyle    Exercise at least 150 minutes a week (30 minutes a day, 5 days a week). This will help you control your weight and prevent disease.      Limit alcohol to one drink per day.      No smoking.       Wear sunscreen to prevent skin cancer.       See your dentist twice a year for an exam and cleaning.      See your eye doctor every 1 to 2 years to screen for conditions such as glaucoma, macular degeneration, cataracts, etc     [facility]    If you have any questions regarding to your visit please contact your care team:       Team Purple:   Clinic Hours Telephone Number   Dr. Azalia Marc     7am-7pm  Monday - Thursday   7am-5pm  Fridays  [phone]  (Appointment scheduling available 24/7)    Questions about your Visit?   Team Line:  [phone]   Urgent Care - [place] - 11am-9pm Monday-Friday Saturday-Sunday- 9am-5pm   [place] - 5pm-9pm Monday-Friday Saturday-Sunday- 9am-5pm  [phone] - [facility]  [phone] [facility]       What options do I have for visits at the clinic other than the traditional office visit?  To expand how we care for you, many of our providers are utilizing electronic visits (e-visits) and telephone visits, when medically appropriate, for interactions with their patients rather than a visit in the clinic.   We also offer nurse visits for  many medical concerns. Just like any other service, we will bill your insurance company for this type of visit based on time spent on the phone with your provider. Not all insurance companies cover these visits. Please check with your medical insurance if this type of visit is covered. You will be responsible for any charges that are not paid by your insurance.      E-visits via iPowerUphart:  generally incur a $35.00 fee.  Telephone visits:  Time spent on the phone: *charged based on time that is spent on the phone in increments of 10 minutes. Estimated cost:   5-10 mins $30.00   11-20 mins. $59.00   21-30 mins. $85.00     Use Fantasy Shopper (secure email communication and access to your chart) to send your primary care provider a message or make an appointment. Ask someone on your Team how to sign up for Fantasy Shopper.  For a Price Quote for your services, please call our [place] Line at [phone].  As always, Thank you for trusting us with your health care needs!              Follow-ups after your visit        Your next 10 appointments already scheduled     Nov 03, 2017 10:00 AM CDT   Anticoagulation Visit with MINESH ANTI COAG   [facility] ([facility])    [address] [facility] [address] [phone]              Future tests that were ordered for you today     Open Future Orders        Priority Expected Expires Ordered    Glucose Routine  11/3/2017 10/3/2017    MA SCREENING DIGITAL BILAT - Future  (s+30) Routine  9/26/2018 10/3/2017    Hepatitis C Screen Reflex to HCV RNA Quant and Genotype Routine  11/3/2017 10/3/2017    Lipid panel reflex to direct LDL Routine  11/3/2017 10/3/2017            Who to contact     If you have questions or need follow up information about today's clinic visit or your schedule please contact [facility] directly at [phone].  Normal or non-critical lab and imaging results will be communicated to you by MyChart, letter or phone within 4  "business days after the clinic has received the results. If you do not hear from us within 7 days, please contact the clinic through Everlater or phone. If you have a critical or abnormal lab result, we will notify you by phone as soon as possible.  Submit refill requests through Everlater or call your pharmacy and they will forward the refill request to us. Please allow 3 business days for your refill to be completed.          Additional Information About Your Visit        Everlater Information     Everlater gives you secure access to your electronic health record. If you see a primary care provider, you can also send messages to your care team and make appointments. If you have questions, please call your primary care clinic.  If you do not have a primary care provider, please call [phone] and they will assist you.        Care EveryWhere ID     This is your Care EveryWhere ID. This could be used by other organizations to access your [facility] medical records  [id number]        Your Vitals Were     Pulse Temperature Height Pulse Oximetry BMI (Body Mass Index)       100 97.4  F (36.3  C) 5' 3.5\" (1.613 m) 98% 24.15 kg/m2        Blood Pressure from Last 3 Encounters:   10/03/17 122/72   08/28/17 107/59   07/31/17 98/68    Weight from Last 3 Encounters:   10/03/17 138 lb 8 oz (62.8 kg)   08/28/17 141 lb (64 kg)   07/31/17 138 lb 6.4 oz (62.8 kg)              We Performed the Following     PAF COMPLETED     PNEUMOCOCCAL CONJ VACCINE 13 VALENT IM          Where to get your medicines      These medications were sent to [facility] [address] - [place], [place] - [address]  [address] [address]     Phone:  [phone]     warfarin 5 MG tablet          Primary Care Provider Office Phone # Fax #    Azalia Daugherty [phone]       [address] [address]        Equal Access to Services     FERMÍN KAMINSKI AH: Hadii emily matthews " radha Pantoja, wafanda luqadaha, qaybta kaclifford ruggiero, nika limajorge jacob. So [facility] [phone].    ATENCIÓN: Si sukhjinderla xavi, tiene a parrish disposición servicios gratuitos de asistencia lingüística. Mark al [phone].    We comply with applicable federal civil rights laws and [place] laws. We do not discriminate on the basis of race, color, national origin, age, disability, sex, sexual orientation, or gender identity.            Thank you!     Thank you for choosing [facility]3] [facility]  for your care. Our goal is always to provide you with excellent care. Hearing back from our patients is one way we can continue to improve our services. Please take a few minutes to complete the written survey that you may receive in the mail after your visit with us. Thank you!             Your Updated Medication List - Protect others around you: Learn how to safely use, store and throw away your medicines at www.disposemymeds.org.          This list is accurate as of: 10/3/17 11:13 AM.  Always use your most recent med list.                   Brand Name Dispense Instructions for use Diagnosis    CALCIUM 500 + D 500-200 MG-IU Tabs      1 TABLET DAILY        IMURAN 50 MG tablet   Generic drug:  azaTHIOprine      1 TABLET DAILY    Routine gynecological examination       Multi-vitamin Tabs tablet   Generic drug:  multivitamin, therapeutic with minerals      1 TABLET DAILY        OMEGA 3-6-9 FATTY ACIDS PO      1 TABLET DAILY        omeprazole 20 MG CR capsule    priLOSEC     1 CAPSULE DAILY    Routine gynecological examination       Vitamin B-12 1000 MCG/ML Soln      1000 MCG Monthly    Routine gynecological examination       Vitamin-B Complex Tabs      1 tablet daily        warfarin 5 MG tablet    COUMADIN    90 tablet    Take 1 tab M,W,F. Take 1/2 tablet the rest of the week or as directed    Personal history of venous thrombosis and embolism, Primary hypercoagulable state (H)

## 2017-10-03 NOTE — NURSING NOTE
"Chief Complaint   Patient presents with     Physical       Initial /72 (BP Location: Right arm, Patient Position: Chair, Cuff Size: Adult Regular)  Pulse 100  Temp 97.4  F (36.3  C)  Ht 5' 3.5\" (1.613 m)  Wt 138 lb 8 oz (62.8 kg)  SpO2 98%  BMI 24.15 kg/m2 Estimated body mass index is 24.15 kg/(m^2) as calculated from the following:    Height as of this encounter: 5' 3.5\" (1.613 m).    Weight as of this encounter: 138 lb 8 oz (62.8 kg).  Medication Reconciliation: complete   Makenzie Terrell MA      "

## 2017-10-16 DIAGNOSIS — Z13.1 SCREENING FOR DIABETES MELLITUS: ICD-10-CM

## 2017-10-16 DIAGNOSIS — Z13.6 CARDIOVASCULAR SCREENING; LDL GOAL LESS THAN 130: ICD-10-CM

## 2017-10-16 DIAGNOSIS — Z11.59 NEED FOR HEPATITIS C SCREENING TEST: ICD-10-CM

## 2017-10-16 LAB
CHOLEST SERPL-MCNC: 207 MG/DL
GLUCOSE SERPL-MCNC: 88 MG/DL (ref 70–99)
HCV AB SERPL QL IA: NONREACTIVE
HDLC SERPL-MCNC: 62 MG/DL
LDLC SERPL CALC-MCNC: 108 MG/DL
NONHDLC SERPL-MCNC: 145 MG/DL
TRIGL SERPL-MCNC: 187 MG/DL

## 2017-10-16 PROCEDURE — 80061 LIPID PANEL: CPT | Performed by: FAMILY MEDICINE

## 2017-10-16 PROCEDURE — 86803 HEPATITIS C AB TEST: CPT | Performed by: FAMILY MEDICINE

## 2017-10-16 PROCEDURE — 82947 ASSAY GLUCOSE BLOOD QUANT: CPT | Performed by: FAMILY MEDICINE

## 2017-10-16 PROCEDURE — 36415 COLL VENOUS BLD VENIPUNCTURE: CPT | Performed by: FAMILY MEDICINE

## 2017-10-27 ENCOUNTER — RADIANT APPOINTMENT (OUTPATIENT)
Dept: MAMMOGRAPHY | Facility: CLINIC | Age: 72
End: 2017-10-27
Attending: FAMILY MEDICINE
Payer: COMMERCIAL

## 2017-10-27 DIAGNOSIS — Z12.31 VISIT FOR SCREENING MAMMOGRAM: ICD-10-CM

## 2017-10-27 PROCEDURE — G0202 SCR MAMMO BI INCL CAD: HCPCS | Mod: TC

## 2017-11-03 ENCOUNTER — ANTICOAGULATION THERAPY VISIT (OUTPATIENT)
Dept: NURSING | Facility: CLINIC | Age: 72
End: 2017-11-03
Payer: COMMERCIAL

## 2017-11-03 DIAGNOSIS — Z79.01 LONG-TERM (CURRENT) USE OF ANTICOAGULANTS: ICD-10-CM

## 2017-11-03 DIAGNOSIS — D68.51 FACTOR V LEIDEN MUTATION (H): ICD-10-CM

## 2017-11-03 DIAGNOSIS — Z86.718 PERSONAL HISTORY OF DVT (DEEP VEIN THROMBOSIS): ICD-10-CM

## 2017-11-03 LAB — INR POINT OF CARE: 2.3 (ref 0.86–1.14)

## 2017-11-03 PROCEDURE — 36416 COLLJ CAPILLARY BLOOD SPEC: CPT

## 2017-11-03 PROCEDURE — 85610 PROTHROMBIN TIME: CPT | Mod: QW

## 2017-11-03 PROCEDURE — 99207 ZZC NO CHARGE NURSE ONLY: CPT

## 2017-11-03 NOTE — PROGRESS NOTES
ANTICOAGULATION FOLLOW-UP CLINIC VISIT    Patient Name:  Kathya Breaux  Date:  11/3/2017  Contact Type:  Face to Face    SUBJECTIVE:     Patient Findings     Positives Change in diet/appetite (Patient has been eating less greens. Will add more back into diet. ), No Problem Findings           OBJECTIVE    INR Protime   Date Value Ref Range Status   11/03/2017 2.3 (A) 0.86 - 1.14 Final       ASSESSMENT / PLAN  No question data found.  Anticoagulation Summary as of 11/3/2017     INR goal 1.5-2.0   Today's INR 2.3!   Maintenance plan 5 mg (5 mg x 1) on Mon, Wed, Fri; 2.5 mg (5 mg x 0.5) all other days   Full instructions 5 mg on Mon, Wed, Fri; 2.5 mg all other days   Weekly total 25 mg   No change documented Emilie José, RN   Plan last modified Landy Garcia RN (3/11/2016)   Next INR check 12/15/2017   Priority INR   Target end date     Indications   Long-term (current) use of anticoagulants [Z79.01] [Z79.01]  Personal history of DVT (deep vein thrombosis) [Z86.718]  Factor V Leiden Heterozygote [D68.51]         Anticoagulation Episode Summary     INR check location     Preferred lab     Send INR reminders to [facility]    Comments       Anticoagulation Care Providers     Provider Role Specialty Phone number    Azalia Daugherty MD [facility] [phone]            See the Encounter Report to view Anticoagulation Flowsheet and Dosing Calendar (Go to Encounters tab in chart review, and find the Anticoagulation Therapy Visit)    Dosage adjustment made based on physician directed care plan.    Emilie José RN

## 2017-11-03 NOTE — MR AVS SNAPSHOT
Kathya HO Saeid   11/3/2017 10:00 AM   Anticoagulation Therapy Visit    Description:  71 year old female   Provider:  [facility]   Department:  [facility]           INR as of 11/3/2017     Today's INR 2.3!      Anticoagulation Summary as of 11/3/2017     INR goal 1.5-2.0   Today's INR 2.3!   Full instructions 5 mg on Mon, Wed, Fri; 2.5 mg all other days   Next INR check 12/15/2017    Indications   Long-term (current) use of anticoagulants [Z79.01] [Z79.01]  Personal history of DVT (deep vein thrombosis) [Z86.718]  Factor V Leiden Heterozygote [D68.51]         Your next Anticoagulation Clinic appointment(s)     Nov 03, 2017 10:00 AM CDT   Anticoagulation Visit with POLINA ANTI COAG   [facility] ([facility]    [address] [place] [address] [phone]            Dec 15, 2017 10:00 AM CST   Anticoagulation Visit with POLINA ANTI COAG   [facility] ([facility])    [address] [place] [id number] [phone]              Contact Numbers     [facility]  Please call [phone] to cancel and/or reschedule your appointment   Please call [phone] with any problems or questions regarding your therapy.        November 2017 Details    Sun Mon Tue Wed Thu Fri Sat        1               2               3      5 mg   See details      4      2.5 mg           5      2.5 mg         6      5 mg         7      2.5 mg         8      5 mg         9      2.5 mg         10      5 mg         11      2.5 mg           12      2.5 mg         13      5 mg         14      2.5 mg         15      5 mg         16      2.5 mg         17      5 mg         18      2.5 mg           19      2.5 mg         20      5 mg         21      2.5 mg         22      5 mg         23      2.5 mg         24      5 mg         25      2.5 mg           26      2.5 mg         27      5 mg         28      2.5 mg         29      5 mg         30      2.5 mg            Date  Details   11/03 This INR check               How to take your warfarin dose     To take:  2.5 mg Take 0.5 of a 5 mg tablet.    To take:  5 mg Take 1 of the 5 mg tablets.           December 2017 Details    Sun Mon Tue Wed Thu Fri Sat          1      5 mg         2      2.5 mg           3      2.5 mg         4      5 mg         5      2.5 mg         6      5 mg         7      2.5 mg         8      5 mg         9      2.5 mg           10      2.5 mg         11      5 mg         12      2.5 mg         13      5 mg         14      2.5 mg         15            16                 17               18               19               20               21               22               23                 24               25               26               27               28               29               30                 31                      Date Details   No additional details    Date of next INR:  12/15/2017         How to take your warfarin dose     To take:  2.5 mg Take 0.5 of a 5 mg tablet.    To take:  5 mg Take 1 of the 5 mg tablets.

## 2017-12-15 ENCOUNTER — ANTICOAGULATION THERAPY VISIT (OUTPATIENT)
Dept: NURSING | Facility: CLINIC | Age: 72
End: 2017-12-15
Payer: COMMERCIAL

## 2017-12-15 DIAGNOSIS — Z79.01 LONG-TERM (CURRENT) USE OF ANTICOAGULANTS: ICD-10-CM

## 2017-12-15 DIAGNOSIS — Z86.718 PERSONAL HISTORY OF DVT (DEEP VEIN THROMBOSIS): ICD-10-CM

## 2017-12-15 DIAGNOSIS — D68.51 FACTOR V LEIDEN MUTATION (H): ICD-10-CM

## 2017-12-15 LAB — INR POINT OF CARE: 2.5 (ref 0.86–1.14)

## 2017-12-15 PROCEDURE — 36416 COLLJ CAPILLARY BLOOD SPEC: CPT

## 2017-12-15 PROCEDURE — 85610 PROTHROMBIN TIME: CPT | Mod: QW

## 2017-12-15 PROCEDURE — 99207 ZZC NO CHARGE NURSE ONLY: CPT

## 2017-12-15 NOTE — PROGRESS NOTES
ANTICOAGULATION FOLLOW-UP CLINIC VISIT    Patient Name:  Kathya Breaux  Date:  12/15/2017  Contact Type:  Face to Face    SUBJECTIVE:     Patient Findings     Positives Change in diet/appetite (Patient has not been eating as many greens.), No Problem Findings           OBJECTIVE    INR Protime   Date Value Ref Range Status   12/15/2017 2.5 (A) 0.86 - 1.14 Final       ASSESSMENT / PLAN  INR assessment SUPRA    Recheck INR In: 3 WEEKS    INR Location Clinic      Anticoagulation Summary as of 12/15/2017     INR goal 1.5-2.0   Today's INR 2.5!   Maintenance plan 5 mg (5 mg x 1) on Mon, Fri; 2.5 mg (5 mg x 0.5) all other days   Full instructions 5 mg on Mon, Fri; 2.5 mg all other days   Weekly total 22.5 mg   Plan last modified Emilie José RN (12/15/2017)   Next INR check 1/5/2018   Priority INR   Target end date     Indications   Long-term (current) use of anticoagulants [Z79.01] [Z79.01]  Personal history of DVT (deep vein thrombosis) [Z86.718]  Factor V Leiden Heterozygote [D68.51]         Anticoagulation Episode Summary     INR check location     Preferred lab     Send INR reminders to [facility]    Comments       Anticoagulation Care Providers     Provider Role Specialty Phone number    Azalia Daugherty MD [facility] [phone]            See the Encounter Report to view Anticoagulation Flowsheet and Dosing Calendar (Go to Encounters tab in chart review, and find the Anticoagulation Therapy Visit)    Dosage adjustment made based on physician directed care plan.    Emilie José, GUILLERMINA

## 2017-12-15 NOTE — MR AVS SNAPSHOT
Kathya Breaux   12/15/2017 10:00 AM   Anticoagulation Therapy Visit    Description:  72 year old female   Provider:  [facility]   Department:  [facility]           INR as of 12/15/2017     Today's INR 2.5!      Anticoagulation Summary as of 12/15/2017     INR goal 1.5-2.0   Today's INR 2.5!   Full instructions 5 mg on Mon, Fri; 2.5 mg all other days   Next INR check 1/5/2018    Indications   Long-term (current) use of anticoagulants [Z79.01] [Z79.01]  Personal history of DVT (deep vein thrombosis) [Z86.718]  Factor V Leiden Heterozygote [D68.51]         Your next Anticoagulation Clinic appointment(s)     Dec 15, 2017 10:00 AM CST   Anticoagulation Visit with [facility] ANTI COAG   [facility] ([address] [address] [phone]            Jan 05, 2018 10:15 AM CST   Anticoagulation Visit with POLINA ANTI COAG   [facility] ([address] [address] [phone]              Contact Numbers     [facility]  Please call [phone] to cancel and/or reschedule your appointment   Please call [phone] with any problems or questions regarding your therapy.        December 2017 Details    Sun Mon Tue Wed Thu Fri Sat          1               2                 3               4               5               6               7               8               9                 10               11               12               13               14               15      5 mg   See details      16      2.5 mg           17      2.5 mg         18      5 mg         19      2.5 mg         20      2.5 mg         21      2.5 mg         22      5 mg         23      2.5 mg           24      2.5 mg         25      5 mg         26      2.5 mg         27      2.5 mg         28      2.5 mg         29      5 mg         30      2.5 mg           31      2.5 mg                Date Details   12/15 This INR check                How to take your warfarin dose     To take:  2.5 mg Take 0.5 of a 5 mg tablet.    To take:  5 mg Take 1 of the 5 mg tablets.           January 2018 Details    Sun Mon Tue Wed Thu Fri Sat      1      5 mg         2      2.5 mg         3      2.5 mg         4      2.5 mg         5            6                 7               8               9               10               11               12               13                 14               15               16               17               18               19               20                 21               22               23               24               25               26               27                 28               29               30               31                   Date Details   No additional details    Date of next INR:  1/5/2018         How to take your warfarin dose     To take:  2.5 mg Take 0.5 of a 5 mg tablet.    To take:  5 mg Take 1 of the 5 mg tablets.

## 2018-01-05 ENCOUNTER — ANTICOAGULATION THERAPY VISIT (OUTPATIENT)
Dept: NURSING | Facility: CLINIC | Age: 73
End: 2018-01-05
Payer: COMMERCIAL

## 2018-01-05 DIAGNOSIS — D68.51 FACTOR V LEIDEN MUTATION (H): ICD-10-CM

## 2018-01-05 DIAGNOSIS — Z79.01 LONG-TERM (CURRENT) USE OF ANTICOAGULANTS: ICD-10-CM

## 2018-01-05 DIAGNOSIS — Z86.718 PERSONAL HISTORY OF DVT (DEEP VEIN THROMBOSIS): ICD-10-CM

## 2018-01-05 LAB — INR POINT OF CARE: 1.8 (ref 0.86–1.14)

## 2018-01-05 PROCEDURE — 36416 COLLJ CAPILLARY BLOOD SPEC: CPT

## 2018-01-05 PROCEDURE — 99207 ZZC NO CHARGE NURSE ONLY: CPT

## 2018-01-05 PROCEDURE — 85610 PROTHROMBIN TIME: CPT | Mod: QW

## 2018-01-05 NOTE — PROGRESS NOTES
ANTICOAGULATION FOLLOW-UP CLINIC VISIT    Patient Name:  Kathya Breaux  Date:  1/5/2018  Contact Type:  Face to Face    SUBJECTIVE:     Patient Findings     Positives No Problem Findings           OBJECTIVE    INR Protime   Date Value Ref Range Status   01/05/2018 1.8 (A) 0.86 - 1.14 Final       ASSESSMENT / PLAN  INR assessment THER    Recheck INR In: 5 WEEKS    INR Location Clinic      Anticoagulation Summary as of 1/5/2018     INR goal 1.5-2.0   Today's INR 1.8   Maintenance plan 5 mg (5 mg x 1) on Mon, Fri; 2.5 mg (5 mg x 0.5) all other days   Full instructions 5 mg on Mon, Fri; 2.5 mg all other days   Weekly total 22.5 mg   No change documented Emilie José RN   Plan last modified Emilie José RN (12/15/2017)   Next INR check 2/9/2018   Priority INR   Target end date     Indications   Long-term (current) use of anticoagulants [Z79.01] [Z79.01]  Personal history of DVT (deep vein thrombosis) [Z86.718]  Factor V Leiden Heterozygote [D68.51]         Anticoagulation Episode Summary     INR check location     Preferred lab     Send INR reminders to [facility]    Comments       Anticoagulation Care Providers     Provider Role Specialty Phone number    Azalia Daugherty MD [facility] [phone]            See the Encounter Report to view Anticoagulation Flowsheet and Dosing Calendar (Go to Encounters tab in chart review, and find the Anticoagulation Therapy Visit)    Dosage adjustment made based on physician directed care plan.    Emilie José RN

## 2018-01-05 NOTE — MR AVS SNAPSHOT
Kathya HO Saeid   1/5/2018 10:15 AM   Anticoagulation Therapy Visit    Description:  72 year old female   Provider:  [facility]   Department:  [facility]           INR as of 1/5/2018     Today's INR 1.8      Anticoagulation Summary as of 1/5/2018     INR goal 1.5-2.0   Today's INR 1.8   Full instructions 5 mg on Mon, Fri; 2.5 mg all other days   Next INR check 2/9/2018    Indications   Long-term (current) use of anticoagulants [Z79.01] [Z79.01]  Personal history of DVT (deep vein thrombosis) [Z86.718]  Factor V Leiden Heterozygote [D68.51]         Your next Anticoagulation Clinic appointment(s)     Jan 05, 2018 10:15 AM CST   Anticoagulation Visit with POLINA ANTI COAG   [facility] ([address] [address] [phone]            Feb 09, 2018  9:45 AM CST   Anticoagulation Visit with POLINA ANTI COAG   [facility] ([address] [address] [phone]              Contact Numbers     [facility]  Please call [phone] to cancel and/or reschedule your appointment   Please call [phone] with any problems or questions regarding your therapy.        January 2018 Details    Sun Mon Tue Wed Thu Fri Sat      1               2               3               4               5      5 mg   See details      6      2.5 mg           7      2.5 mg         8      5 mg         9      2.5 mg         10      2.5 mg         11      2.5 mg         12      5 mg         13      2.5 mg           14      2.5 mg         15      5 mg         16      2.5 mg         17      2.5 mg         18      2.5 mg         19      5 mg         20      2.5 mg           21      2.5 mg         22      5 mg         23      2.5 mg         24      2.5 mg         25      2.5 mg         26      5 mg         27      2.5 mg           28      2.5 mg         29      5 mg         30      2.5 mg         31      2.5 mg              Date Details   01/05 This INR check               How to take your warfarin dose     To take:  2.5 mg Take 0.5 of a 5 mg tablet.    To take:  5 mg Take 1 of the 5 mg tablets.           February 2018 Details    Sun Mon Tue Wed Thu Fri Sat         1      2.5 mg         2      5 mg         3      2.5 mg           4      2.5 mg         5      5 mg         6      2.5 mg         7      2.5 mg         8      2.5 mg         9            10                 11               12               13               14               15               16               17                 18               19               20               21               22               23               24                 25               26               27               28                   Date Details   No additional details    Date of next INR:  2/9/2018         How to take your warfarin dose     To take:  2.5 mg Take 0.5 of a 5 mg tablet.    To take:  5 mg Take 1 of the 5 mg tablets.

## 2018-01-26 ENCOUNTER — TRANSFERRED RECORDS (OUTPATIENT)
Dept: HEALTH INFORMATION MANAGEMENT | Facility: CLINIC | Age: 73
End: 2018-01-26

## 2018-02-13 ENCOUNTER — ANTICOAGULATION THERAPY VISIT (OUTPATIENT)
Dept: NURSING | Facility: CLINIC | Age: 73
End: 2018-02-13
Payer: COMMERCIAL

## 2018-02-13 DIAGNOSIS — D68.51 FACTOR V LEIDEN MUTATION (H): ICD-10-CM

## 2018-02-13 DIAGNOSIS — Z79.01 LONG-TERM (CURRENT) USE OF ANTICOAGULANTS: ICD-10-CM

## 2018-02-13 DIAGNOSIS — Z86.718 PERSONAL HISTORY OF DVT (DEEP VEIN THROMBOSIS): ICD-10-CM

## 2018-02-13 LAB — INR POINT OF CARE: 2.3 (ref 0.86–1.14)

## 2018-02-13 PROCEDURE — 36416 COLLJ CAPILLARY BLOOD SPEC: CPT

## 2018-02-13 PROCEDURE — 85610 PROTHROMBIN TIME: CPT | Mod: QW

## 2018-02-13 PROCEDURE — 99207 ZZC NO CHARGE NURSE ONLY: CPT

## 2018-02-13 NOTE — PROGRESS NOTES
ANTICOAGULATION FOLLOW-UP CLINIC VISIT    Patient Name:  Kathya Breaux  Date:  2/13/2018  Contact Type:  Face to Face    SUBJECTIVE:     Patient Findings     Positives Activity level change (Patient just came back from [place]), No Problem Findings, Unexplained INR or factor level change           OBJECTIVE    INR Protime   Date Value Ref Range Status   02/13/2018 2.3 (A) 0.86 - 1.14 Final       ASSESSMENT / PLAN  INR assessment SUPRA    Recheck INR In: 4 WEEKS    INR Location Clinic      Anticoagulation Summary as of 2/13/2018     INR goal 1.5-2.0   Today's INR 2.3!   Maintenance plan 5 mg (5 mg x 1) on Mon, Fri; 2.5 mg (5 mg x 0.5) all other days   Full instructions 5 mg on Mon, Fri; 2.5 mg all other days   Weekly total 22.5 mg   No change documented Emilie José RN   Plan last modified Emilie José RN (12/15/2017)   Next INR check 3/13/2018   Priority INR   Target end date     Indications   Long-term (current) use of anticoagulants [Z79.01] [Z79.01]  Personal history of DVT (deep vein thrombosis) [Z86.718]  Factor V Leiden Heterozygote [D68.51]         Anticoagulation Episode Summary     INR check location     Preferred lab     Send INR reminders to [facility]    Comments       Anticoagulation Care Providers     Provider Role Specialty Phone number    Azalia Daugherty MD [facility] [phone]            See the Encounter Report to view Anticoagulation Flowsheet and Dosing Calendar (Go to Encounters tab in chart review, and find the Anticoagulation Therapy Visit)    Dosage adjustment made based on physician directed care plan.    Emilie José RN

## 2018-02-13 NOTE — MR AVS SNAPSHOT
Kathya HO Saeid   2/13/2018 10:30 AM   Anticoagulation Therapy Visit    Description:  72 year old female   Provider:  [facility]   Department:  [facility]           INR as of 2/13/2018     Today's INR 2.3!      Anticoagulation Summary as of 2/13/2018     INR goal 1.5-2.0   Today's INR 2.3!   Full instructions 5 mg on Mon, Fri; 2.5 mg all other days   Next INR check 3/13/2018    Indications   Long-term (current) use of anticoagulants [Z79.01] [Z79.01]  Personal history of DVT (deep vein thrombosis) [Z86.718]  Factor V Leiden Heterozygote [D68.51]         Your next Anticoagulation Clinic appointment(s)     Mar 13, 2018 10:15 AM CDT   Anticoagulation Visit with [facility] ANTI EMMA   [facility] ([address] [address] [phone]              Contact Numbers     [facility]  Please call [phone] to cancel and/or reschedule your appointment   Please call [phone] with any problems or questions regarding your therapy.        February 2018 Details    Sun Mon Tue Wed Thu Fri Sat         1               2               3                 4               5               6               7               8               9               10                 11               12               13      2.5 mg   See details      14      2.5 mg         15      2.5 mg         16      5 mg         17      2.5 mg           18      2.5 mg         19      5 mg         20      2.5 mg         21      2.5 mg         22      2.5 mg         23      5 mg         24      2.5 mg           25      2.5 mg         26      5 mg         27      2.5 mg         28      2.5 mg             Date Details   02/13 This INR check               How to take your warfarin dose     To take:  2.5 mg Take 0.5 of a 5 mg tablet.    To take:  5 mg Take 1 of the 5 mg tablets.           March 2018 Details    Sun Mon Tue Wed Thu Fri Sat         1      2.5 mg         2      5 mg         3      2.5 mg            4      2.5 mg         5      5 mg         6      2.5 mg         7      2.5 mg         8      2.5 mg         9      5 mg         10      2.5 mg           11      2.5 mg         12      5 mg         13            14               15               16               17                 18               19               20               21               22               23               24                 25               26               27               28               29               30               31                Date Details   No additional details    Date of next INR:  3/13/2018         How to take your warfarin dose     To take:  2.5 mg Take 0.5 of a 5 mg tablet.    To take:  5 mg Take 1 of the 5 mg tablets.

## 2018-03-13 ENCOUNTER — ANTICOAGULATION THERAPY VISIT (OUTPATIENT)
Dept: NURSING | Facility: CLINIC | Age: 73
End: 2018-03-13
Payer: COMMERCIAL

## 2018-03-13 DIAGNOSIS — Z79.01 LONG-TERM (CURRENT) USE OF ANTICOAGULANTS: ICD-10-CM

## 2018-03-13 DIAGNOSIS — Z86.718 PERSONAL HISTORY OF DVT (DEEP VEIN THROMBOSIS): ICD-10-CM

## 2018-03-13 DIAGNOSIS — D68.51 FACTOR V LEIDEN MUTATION (H): ICD-10-CM

## 2018-03-13 LAB — INR POINT OF CARE: 1.9 (ref 0.86–1.14)

## 2018-03-13 PROCEDURE — 85610 PROTHROMBIN TIME: CPT | Mod: QW

## 2018-03-13 PROCEDURE — 36416 COLLJ CAPILLARY BLOOD SPEC: CPT

## 2018-03-13 PROCEDURE — 99207 ZZC NO CHARGE NURSE ONLY: CPT

## 2018-03-13 NOTE — PROGRESS NOTES
ANTICOAGULATION FOLLOW-UP CLINIC VISIT    Patient Name:  Kathya Breaux  Date:  3/13/2018  Contact Type:  Face to Face    SUBJECTIVE:     Patient Findings     Positives No Problem Findings           OBJECTIVE    INR Protime   Date Value Ref Range Status   03/13/2018 1.9 (A) 0.86 - 1.14 Final       ASSESSMENT / PLAN  INR assessment THER    Recheck INR In: 6 WEEKS    INR Location Clinic      Anticoagulation Summary as of 3/13/2018     INR goal 1.5-2.0   Today's INR 1.9   Maintenance plan 5 mg (5 mg x 1) on Mon, Fri; 2.5 mg (5 mg x 0.5) all other days   Full instructions 5 mg on Mon, Fri; 2.5 mg all other days   Weekly total 22.5 mg   No change documented Emilie José RN   Plan last modified Emilie José RN (12/15/2017)   Next INR check 4/24/2018   Priority INR   Target end date     Indications   Long-term (current) use of anticoagulants [Z79.01] [Z79.01]  Personal history of DVT (deep vein thrombosis) [Z86.718]  Factor V Leiden Heterozygote [D68.51]         Anticoagulation Episode Summary     INR check location     Preferred lab     Send INR reminders to [facility]    Comments       Anticoagulation Care Providers     Provider Role Specialty Phone number    Azalia Daugherty MD [facility] [phone]            See the Encounter Report to view Anticoagulation Flowsheet and Dosing Calendar (Go to Encounters tab in chart review, and find the Anticoagulation Therapy Visit)    Dosage adjustment made based on physician directed care plan.    Emilie José RN

## 2018-03-13 NOTE — MR AVS SNAPSHOT
Kathya Breaux   3/13/2018 10:15 AM   Anticoagulation Therapy Visit    Description:  72 year old female   Provider:  [facility]   Department:  [facility]           INR as of 3/13/2018     Today's INR 1.9      Anticoagulation Summary as of 3/13/2018     INR goal 1.5-2.0   Today's INR 1.9   Full instructions 5 mg on Mon, Fri; 2.5 mg all other days   Next INR check 4/24/2018    Indications   Long-term (current) use of anticoagulants [Z79.01] [Z79.01]  Personal history of DVT (deep vein thrombosis) [Z86.718]  Factor V Leiden Heterozygote [D68.51]         Your next Anticoagulation Clinic appointment(s)     Mar 13, 2018 10:15 AM CDT   Anticoagulation Visit with POLINA ANTI COAG   [facility] ([address] [address] [phone]            Apr 24, 2018 10:15 AM CDT   Anticoagulation Visit with POLINA ANTI COAG   [facility] ([address] [address] [phone]              Contact Numbers     [facility]  Please call [phone] to cancel and/or reschedule your appointment   Please call [phone] with any problems or questions regarding your therapy.        March 2018 Details    Sun Mon Tue Wed Thu Fri Sat         1               2               3                 4               5               6               7               8               9               10                 11               12               13      2.5 mg   See details      14      2.5 mg         15      2.5 mg         16      5 mg         17      2.5 mg           18      2.5 mg         19      5 mg         20      2.5 mg         21      2.5 mg         22      2.5 mg         23      5 mg         24      2.5 mg           25      2.5 mg         26      5 mg         27      2.5 mg         28      2.5 mg         29      2.5 mg         30      5 mg         31      2.5 mg          Date Details   03/13 This INR check                How to take your warfarin dose     To take:  2.5 mg Take 0.5 of a 5 mg tablet.    To take:  5 mg Take 1 of the 5 mg tablets.           April 2018 Details    Sun Mon Tue Wed Thu Fri Sat     1      2.5 mg         2      5 mg         3      2.5 mg         4      2.5 mg         5      2.5 mg         6      5 mg         7      2.5 mg           8      2.5 mg         9      5 mg         10      2.5 mg         11      2.5 mg         12      2.5 mg         13      5 mg         14      2.5 mg           15      2.5 mg         16      5 mg         17      2.5 mg         18      2.5 mg         19      2.5 mg         20      5 mg         21      2.5 mg           22      2.5 mg         23      5 mg         24            25               26               27               28                 29               30                     Date Details   No additional details    Date of next INR:  4/24/2018         How to take your warfarin dose     To take:  2.5 mg Take 0.5 of a 5 mg tablet.    To take:  5 mg Take 1 of the 5 mg tablets.

## 2018-03-20 ENCOUNTER — TRANSFERRED RECORDS (OUTPATIENT)
Dept: HEALTH INFORMATION MANAGEMENT | Facility: CLINIC | Age: 73
End: 2018-03-20

## 2018-04-24 ENCOUNTER — ANTICOAGULATION THERAPY VISIT (OUTPATIENT)
Dept: NURSING | Facility: CLINIC | Age: 73
End: 2018-04-24
Payer: COMMERCIAL

## 2018-04-24 DIAGNOSIS — Z86.718 PERSONAL HISTORY OF DVT (DEEP VEIN THROMBOSIS): ICD-10-CM

## 2018-04-24 DIAGNOSIS — Z79.01 LONG-TERM (CURRENT) USE OF ANTICOAGULANTS: ICD-10-CM

## 2018-04-24 DIAGNOSIS — D68.51 FACTOR V LEIDEN MUTATION (H): ICD-10-CM

## 2018-04-24 LAB — INR POINT OF CARE: 1.9 (ref 0.86–1.14)

## 2018-04-24 PROCEDURE — 85610 PROTHROMBIN TIME: CPT | Mod: QW

## 2018-04-24 PROCEDURE — 99207 ZZC NO CHARGE NURSE ONLY: CPT

## 2018-04-24 PROCEDURE — 36416 COLLJ CAPILLARY BLOOD SPEC: CPT

## 2018-04-24 NOTE — PROGRESS NOTES
ANTICOAGULATION FOLLOW-UP CLINIC VISIT    Patient Name:  Kathya Breaux  Date:  4/24/2018  Contact Type:  Face to Face    SUBJECTIVE:     Patient Findings     Positives No Problem Findings           OBJECTIVE    INR Protime   Date Value Ref Range Status   04/24/2018 1.9 (A) 0.86 - 1.14 Final       ASSESSMENT / PLAN  INR assessment THER    Recheck INR In: 6 WEEKS    INR Location Clinic      Anticoagulation Summary as of 4/24/2018     INR goal 1.5-2.0   Today's INR 1.9   Maintenance plan 5 mg (5 mg x 1) on Mon, Fri; 2.5 mg (5 mg x 0.5) all other days   Full instructions 5 mg on Mon, Fri; 2.5 mg all other days   Weekly total 22.5 mg   No change documented Emilie José RN   Plan last modified Emilie José RN (12/15/2017)   Next INR check 6/5/2018   Priority INR   Target end date     Indications   Long-term (current) use of anticoagulants [Z79.01] [Z79.01]  Personal history of DVT (deep vein thrombosis) [Z86.718]  Factor V Leiden Heterozygote [D68.51]         Anticoagulation Episode Summary     INR check location     Preferred lab     Send INR reminders to [facility]    Comments       Anticoagulation Care Providers     Provider Role Specialty Phone number    Azalia Daugherty MD [facility] [phone]            See the Encounter Report to view Anticoagulation Flowsheet and Dosing Calendar (Go to Encounters tab in chart review, and find the Anticoagulation Therapy Visit)    Dosage adjustment made based on physician directed care plan.    Emilie José RN               
Skin normal color for race, warm, dry and intact. No evidence of rash.

## 2018-04-24 NOTE — MR AVS SNAPSHOT
Kathya Breaux   4/24/2018 10:15 AM   Anticoagulation Therapy Visit    Description:  72 year old female   Provider:  [facility]   Department:  [facility]           INR as of 4/24/2018     Today's INR 1.9      Anticoagulation Summary as of 4/24/2018     INR goal 1.5-2.0   Today's INR 1.9   Full instructions 5 mg on Mon, Fri; 2.5 mg all other days   Next INR check 6/5/2018    Indications   Long-term (current) use of anticoagulants [Z79.01] [Z79.01]  Personal history of DVT (deep vein thrombosis) [Z86.718]  Factor V Leiden Heterozygote [D68.51]         Your next Anticoagulation Clinic appointment(s)     Apr 24, 2018 10:15 AM CDT   Anticoagulation Visit with POL[facility] ANTI COAG   [facility] ([address] [address] [phone]            Jun 05, 2018 10:15 AM CDT   Anticoagulation Visit with POLINA ANTI COAG   [facility] ([address] [address] [phone]              Contact Numbers     [facility]  Please call [phone] to cancel and/or reschedule your appointment   Please call [phone] with any problems or questions regarding your therapy.        April 2018 Details    Sun Mon Tue Wed Thu Fri Sat     1               2               3               4               5               6               7                 8               9               10               11               12               13               14                 15               16               17               18               19               20               21                 22               23               24      2.5 mg   See details      25      2.5 mg         26      2.5 mg         27      5 mg         28      2.5 mg           29      2.5 mg         30      5 mg               Date Details   04/24 This INR check               How to take your warfarin dose     To take:  2.5 mg Take 0.5 of a 5 mg  tablet.    To take:  5 mg Take 1 of the 5 mg tablets.           May 2018 Details    Sun Mon Tue Wed Thu Fri Sat       1      2.5 mg         2      2.5 mg         3      2.5 mg         4      5 mg         5      2.5 mg           6      2.5 mg         7      5 mg         8      2.5 mg         9      2.5 mg         10      2.5 mg         11      5 mg         12      2.5 mg           13      2.5 mg         14      5 mg         15      2.5 mg         16      2.5 mg         17      2.5 mg         18      5 mg         19      2.5 mg           20      2.5 mg         21      5 mg         22      2.5 mg         23      2.5 mg         24      2.5 mg         25      5 mg         26      2.5 mg           27      2.5 mg         28      5 mg         29      2.5 mg         30      2.5 mg         31      2.5 mg            Date Details   No additional details            How to take your warfarin dose     To take:  2.5 mg Take 0.5 of a 5 mg tablet.    To take:  5 mg Take 1 of the 5 mg tablets.           June 2018 Details    Sun Mon Tue Wed Thu Fri Sat          1      5 mg         2      2.5 mg           3      2.5 mg         4      5 mg         5            6               7               8               9                 10               11               12               13               14               15               16                 17               18               19               20               21               22               23                 24               25               26               27               28               29               30                Date Details   No additional details    Date of next INR:  6/5/2018         How to take your warfarin dose     To take:  2.5 mg Take 0.5 of a 5 mg tablet.    To take:  5 mg Take 1 of the 5 mg tablets.

## 2018-05-03 ENCOUNTER — TRANSFERRED RECORDS (OUTPATIENT)
Dept: HEALTH INFORMATION MANAGEMENT | Facility: CLINIC | Age: 73
End: 2018-05-03

## 2018-06-08 ENCOUNTER — ANTICOAGULATION THERAPY VISIT (OUTPATIENT)
Dept: NURSING | Facility: CLINIC | Age: 73
End: 2018-06-08
Payer: COMMERCIAL

## 2018-06-08 DIAGNOSIS — Z79.01 LONG-TERM (CURRENT) USE OF ANTICOAGULANTS: ICD-10-CM

## 2018-06-08 DIAGNOSIS — Z86.718 PERSONAL HISTORY OF DVT (DEEP VEIN THROMBOSIS): ICD-10-CM

## 2018-06-08 DIAGNOSIS — D68.51 FACTOR V LEIDEN MUTATION (H): ICD-10-CM

## 2018-06-08 LAB — INR POINT OF CARE: 1.7 (ref 0.86–1.14)

## 2018-06-08 PROCEDURE — 99207 ZZC NO CHARGE NURSE ONLY: CPT

## 2018-06-08 PROCEDURE — 36416 COLLJ CAPILLARY BLOOD SPEC: CPT

## 2018-06-08 PROCEDURE — 85610 PROTHROMBIN TIME: CPT | Mod: QW

## 2018-06-08 NOTE — PROGRESS NOTES
ANTICOAGULATION FOLLOW-UP CLINIC VISIT    Patient Name:  Kathya Breaux  Date:  6/8/2018  Contact Type:  Face to Face    SUBJECTIVE:     Patient Findings     Positives No Problem Findings           OBJECTIVE    INR Protime   Date Value Ref Range Status   06/08/2018 1.7 (A) 0.86 - 1.14 Final       ASSESSMENT / PLAN  INR assessment THER    Recheck INR In: 6 WEEKS    INR Location Clinic      Anticoagulation Summary as of 6/8/2018     INR goal 1.5-2.0   Today's INR 1.7   Warfarin maintenance plan 5 mg (5 mg x 1) on Mon, Fri; 2.5 mg (5 mg x 0.5) all other days   Full warfarin instructions 5 mg on Mon, Fri; 2.5 mg all other days   Weekly warfarin total 22.5 mg   No change documented Emilie José RN   Plan last modified Emilie José RN (12/15/2017)   Next INR check 7/20/2018   Priority INR   Target end date     Indications   Long-term (current) use of anticoagulants [Z79.01] [Z79.01]  Personal history of DVT (deep vein thrombosis) [Z86.718]  Factor V Leiden Heterozygote [D68.51]         Anticoagulation Episode Summary     INR check location     Preferred lab     Send INR reminders to [facility]    Comments       Anticoagulation Care Providers     Provider Role Specialty Phone number    Azalia Daugherty MD [facility] [phone]            See the Encounter Report to view Anticoagulation Flowsheet and Dosing Calendar (Go to Encounters tab in chart review, and find the Anticoagulation Therapy Visit)    Dosage adjustment made based on physician directed care plan.    Emilie José RN

## 2018-06-08 NOTE — MR AVS SNAPSHOT
Kathya VIC Saeid   6/8/2018 9:00 AM   Anticoagulation Therapy Visit    Description:  72 year old female   Provider:  [facility]   Department:  [facility]           INR as of 6/8/2018     Today's INR 1.7      Anticoagulation Summary as of 6/8/2018     INR goal 1.5-2.0   Today's INR 1.7   Full warfarin instructions 5 mg on Mon, Fri; 2.5 mg all other days   Next INR check 7/20/2018    Indications   Long-term (current) use of anticoagulants [Z79.01] [Z79.01]  Personal history of DVT (deep vein thrombosis) [Z86.718]  Factor V Leiden Heterozygote [D68.51]         Your next Anticoagulation Clinic appointment(s)     Jun 08, 2018  9:00 AM CDT   Anticoagulation Visit with POL[facility] ANTI COAG   [facility] ([address] [address] [phone]            Jul 20, 2018  9:45 AM CDT   Anticoagulation Visit with [facility] ANTI COAG   [facility] ([address] [address] [phone]              Contact Numbers     [facility]  Please call [phone] to cancel and/or reschedule your appointment   Please call [phone] with any problems or questions regarding your therapy.        June 2018 Details    Sun Mon Tue Wed Thu Fri Sat          1               2                 3               4               5               6               7               8      5 mg   See details      9      2.5 mg           10      2.5 mg         11      5 mg         12      2.5 mg         13      2.5 mg         14      2.5 mg         15      5 mg         16      2.5 mg           17      2.5 mg         18      5 mg         19      2.5 mg         20      2.5 mg         21      2.5 mg         22      5 mg         23      2.5 mg           24      2.5 mg         25      5 mg         26      2.5 mg         27      2.5 mg         28      2.5 mg         29      5 mg         30      2.5 mg          Date Details   06/08 This INR check                How to take your warfarin dose     To take:  2.5 mg Take 0.5 of a 5 mg tablet.    To take:  5 mg Take 1 of the 5 mg tablets.           July 2018 Details    Sun Mon Tue Wed Thu Fri Sat     1      2.5 mg         2      5 mg         3      2.5 mg         4      2.5 mg         5      2.5 mg         6      5 mg         7      2.5 mg           8      2.5 mg         9      5 mg         10      2.5 mg         11      2.5 mg         12      2.5 mg         13      5 mg         14      2.5 mg           15      2.5 mg         16      5 mg         17      2.5 mg         18      2.5 mg         19      2.5 mg         20            21                 22               23               24               25               26               27               28                 29               30               31                    Date Details   No additional details    Date of next INR:  7/20/2018         How to take your warfarin dose     To take:  2.5 mg Take 0.5 of a 5 mg tablet.    To take:  5 mg Take 1 of the 5 mg tablets.

## 2018-07-20 ENCOUNTER — ANTICOAGULATION THERAPY VISIT (OUTPATIENT)
Dept: NURSING | Facility: CLINIC | Age: 73
End: 2018-07-20
Payer: COMMERCIAL

## 2018-07-20 DIAGNOSIS — Z79.01 LONG-TERM (CURRENT) USE OF ANTICOAGULANTS: ICD-10-CM

## 2018-07-20 DIAGNOSIS — Z86.718 PERSONAL HISTORY OF DVT (DEEP VEIN THROMBOSIS): ICD-10-CM

## 2018-07-20 DIAGNOSIS — D68.51 FACTOR V LEIDEN MUTATION (H): ICD-10-CM

## 2018-07-20 LAB — INR POINT OF CARE: 2 (ref 0.86–1.14)

## 2018-07-20 PROCEDURE — 36416 COLLJ CAPILLARY BLOOD SPEC: CPT

## 2018-07-20 PROCEDURE — 85610 PROTHROMBIN TIME: CPT | Mod: QW

## 2018-07-20 PROCEDURE — 99207 ZZC NO CHARGE NURSE ONLY: CPT

## 2018-07-20 NOTE — MR AVS SNAPSHOT
Kathya Breaux   7/20/2018 9:45 AM   Anticoagulation Therapy Visit    Description:  72 year old female   Provider:  [facility]   Department:  [facility]           INR as of 7/20/2018     Today's INR 2.0      Anticoagulation Summary as of 7/20/2018     INR goal 1.5-2.0   Today's INR 2.0   Full warfarin instructions 5 mg on Mon, Fri; 2.5 mg all other days   Next INR check 8/31/2018    Indications   Long-term (current) use of anticoagulants [Z79.01] [Z79.01]  Personal history of DVT (deep vein thrombosis) [Z86.718]  Factor V Leiden Heterozygote [D68.51]         Your next Anticoagulation Clinic appointment(s)     Jul 20, 2018  9:45 AM CDT   Anticoagulation Visit with [facility] ANTI COAG   [facility] ([address] [address] [phone]            Aug 31, 2018  9:30 AM CDT   Anticoagulation Visit with POL[facility] ANTI COAG   [facility] ([address] [address] [phone]              Contact Numbers     [facility]  Please call [phone] to cancel and/or reschedule your appointment   Please call [phone] with any problems or questions regarding your therapy.        July 2018 Details    Sun Mon Tue Wed Thu Fri Sat     1               2               3               4               5               6               7                 8               9               10               11               12               13               14                 15               16               17               18               19               20      5 mg   See details      21      2.5 mg           22      2.5 mg         23      5 mg         24      2.5 mg         25      2.5 mg         26      2.5 mg         27      5 mg         28      2.5 mg           29      2.5 mg         30      5 mg         31      2.5 mg              Date Details   07/20 This INR check               How to take your warfarin  dose     To take:  2.5 mg Take 0.5 of a 5 mg tablet.    To take:  5 mg Take 1 of the 5 mg tablets.           August 2018 Details    Sun Mon Tue Wed Thu Fri Sat        1      2.5 mg         2      2.5 mg         3      5 mg         4      2.5 mg           5      2.5 mg         6      5 mg         7      2.5 mg         8      2.5 mg         9      2.5 mg         10      5 mg         11      2.5 mg           12      2.5 mg         13      5 mg         14      2.5 mg         15      2.5 mg         16      2.5 mg         17      5 mg         18      2.5 mg           19      2.5 mg         20      5 mg         21      2.5 mg         22      2.5 mg         23      2.5 mg         24      5 mg         25      2.5 mg           26      2.5 mg         27      5 mg         28      2.5 mg         29      2.5 mg         30      2.5 mg         31              Date Details   No additional details    Date of next INR:  8/31/2018         How to take your warfarin dose     To take:  2.5 mg Take 0.5 of a 5 mg tablet.    To take:  5 mg Take 1 of the 5 mg tablets.

## 2018-07-20 NOTE — PROGRESS NOTES
ANTICOAGULATION FOLLOW-UP CLINIC VISIT    Patient Name:  Kathya Breaux  Date:  7/20/2018  Contact Type:  Face to Face    SUBJECTIVE:     Patient Findings     Positives No Problem Findings           OBJECTIVE    INR Protime   Date Value Ref Range Status   07/20/2018 2.0 (A) 0.86 - 1.14 Final       ASSESSMENT / PLAN  INR assessment THER    Recheck INR In: 6 WEEKS    INR Location Clinic      Anticoagulation Summary as of 7/20/2018     INR goal 1.5-2.0   Today's INR 2.0   Warfarin maintenance plan 5 mg (5 mg x 1) on Mon, Fri; 2.5 mg (5 mg x 0.5) all other days   Full warfarin instructions 5 mg on Mon, Fri; 2.5 mg all other days   Weekly warfarin total 22.5 mg   No change documented Emilie José RN   Plan last modified Emilie José RN (12/15/2017)   Next INR check 8/31/2018   Priority INR   Target end date Indefinite    Indications   Long-term (current) use of anticoagulants [Z79.01] [Z79.01]  Personal history of DVT (deep vein thrombosis) [Z86.718]  Factor V Leiden Heterozygote [D68.51]         Anticoagulation Episode Summary     INR check location     Preferred lab     Send INR reminders to [facility]    Comments       Anticoagulation Care Providers     Provider Role Specialty Phone number    Azalia Daugherty MD [facility] [phone]            See the Encounter Report to view Anticoagulation Flowsheet and Dosing Calendar (Go to Encounters tab in chart review, and find the Anticoagulation Therapy Visit)    Dosage adjustment made based on physician directed care plan.    Emilie José RN

## 2018-08-14 ENCOUNTER — TRANSFERRED RECORDS (OUTPATIENT)
Dept: HEALTH INFORMATION MANAGEMENT | Facility: CLINIC | Age: 73
End: 2018-08-14

## 2018-08-31 ENCOUNTER — ANTICOAGULATION THERAPY VISIT (OUTPATIENT)
Dept: NURSING | Facility: CLINIC | Age: 73
End: 2018-08-31
Payer: COMMERCIAL

## 2018-08-31 DIAGNOSIS — Z79.01 LONG-TERM (CURRENT) USE OF ANTICOAGULANTS: ICD-10-CM

## 2018-08-31 DIAGNOSIS — D68.51 FACTOR V LEIDEN MUTATION (H): ICD-10-CM

## 2018-08-31 DIAGNOSIS — Z86.718 PERSONAL HISTORY OF DVT (DEEP VEIN THROMBOSIS): ICD-10-CM

## 2018-08-31 LAB — INR POINT OF CARE: 1.4 (ref 0.86–1.14)

## 2018-08-31 PROCEDURE — 36416 COLLJ CAPILLARY BLOOD SPEC: CPT

## 2018-08-31 PROCEDURE — 85610 PROTHROMBIN TIME: CPT | Mod: QW

## 2018-08-31 PROCEDURE — 99207 ZZC NO CHARGE NURSE ONLY: CPT

## 2018-08-31 NOTE — PROGRESS NOTES
ANTICOAGULATION FOLLOW-UP CLINIC VISIT    Patient Name:  Kathya Breaux  Date:  8/31/2018  Contact Type:  Face to Face    SUBJECTIVE:     Patient Findings     Positives No Problem Findings    Comments Bleeding Signs/Symptoms: NO  Thromboembolic Signs/Symptoms: NO     Medication Changes:  NO  Dietary Changes:  NO  Bacterial/Viral Infection: NO     Missed Coumadin Doses: NO  Other Concerns:  NO             OBJECTIVE    INR Protime   Date Value Ref Range Status   08/31/2018 1.4 (A) 0.86 - 1.14 Final       ASSESSMENT / PLAN  INR assessment THER    Recheck INR In: 6 WEEKS    INR Location Clinic      Anticoagulation Summary as of 8/31/2018     INR goal 1.5-2.0   Today's INR 1.4!   Warfarin maintenance plan 5 mg (5 mg x 1) on Mon, Fri; 2.5 mg (5 mg x 0.5) all other days   Full warfarin instructions 5 mg on Mon, Fri; 2.5 mg all other days   Weekly warfarin total 22.5 mg   No change documented Emilie José RN   Plan last modified Emilie José RN (12/15/2017)   Next INR check 10/12/2018   Priority INR   Target end date Indefinite    Indications   Long-term (current) use of anticoagulants [Z79.01] [Z79.01]  Personal history of DVT (deep vein thrombosis) [Z86.718]  Factor V Leiden Heterozygote [D68.51]         Anticoagulation Episode Summary     INR check location     Preferred lab     Send INR reminders to [facility]    Comments       Anticoagulation Care Providers     Provider Role Specialty Phone number    Azalia Daugherty MD [facility] [phone]            See the Encounter Report to view Anticoagulation Flowsheet and Dosing Calendar (Go to Encounters tab in chart review, and find the Anticoagulation Therapy Visit)    Dosage adjustment made based on physician directed care plan.    Emilie José RN

## 2018-08-31 NOTE — MR AVS SNAPSHOT
Kathya VIC Saeid   8/31/2018 9:30 AM   Anticoagulation Therapy Visit    Description:  72 year old female   Provider:  [facility]ATRIXIE   Department:  [facility]           INR as of 8/31/2018     Today's INR 1.4!      Anticoagulation Summary as of 8/31/2018     INR goal 1.5-2.0   Today's INR 1.4!   Full warfarin instructions 5 mg on Mon, Fri; 2.5 mg all other days   Next INR check 10/12/2018    Indications   Long-term (current) use of anticoagulants [Z79.01] [Z79.01]  Personal history of DVT (deep vein thrombosis) [Z86.718]  Factor V Leiden Heterozygote [D68.51]         Your next Anticoagulation Clinic appointment(s)     Aug 31, 2018  9:30 AM CDT   Anticoagulation Visit with [facility] ANTI COATRIXIE   [facility]    [address] [place] [address] [phone]            Oct 12, 2018  9:15 AM CDT   Anticoagulation Visit with [facility] ANTI COAG   [facility] ([facility])    [address] [place] [address] [phone]              Contact Numbers     [facility]  Please call [phone] to cancel and/or reschedule your appointment   Please call [phone] with any problems or questions regarding your therapy.        August 2018 Details    Sun Mon Tue Wed Thu Fri Sat        1               2               3               4                 5               6               7               8               9               10               11                 12               13               14               15               16               17               18                 19               20               21               22               23               24               25                 26               27               28               29               30               31      5 mg   See details        Date Details   08/31 This INR check               How to take your warfarin dose     To take:  5 mg Take 1 of the 5 mg tablets.            September 2018 Details    Sun Mon Tue Wed Thu Fri Sat           1      2.5 mg           2      2.5 mg         3      5 mg         4      2.5 mg         5      2.5 mg         6      2.5 mg         7      5 mg         8      2.5 mg           9      2.5 mg         10      5 mg         11      2.5 mg         12      2.5 mg         13      2.5 mg         14      5 mg         15      2.5 mg           16      2.5 mg         17      5 mg         18      2.5 mg         19      2.5 mg         20      2.5 mg         21      5 mg         22      2.5 mg           23      2.5 mg         24      5 mg         25      2.5 mg         26      2.5 mg         27      2.5 mg         28      5 mg         29      2.5 mg           30      2.5 mg                Date Details   No additional details            How to take your warfarin dose     To take:  2.5 mg Take 0.5 of a 5 mg tablet.    To take:  5 mg Take 1 of the 5 mg tablets.           October 2018 Details    Sun Mon Tue Wed Thu Fri Sat      1      5 mg         2      2.5 mg         3      2.5 mg         4      2.5 mg         5      5 mg         6      2.5 mg           7      2.5 mg         8      5 mg         9      2.5 mg         10      2.5 mg         11      2.5 mg         12            13                 14               15               16               17               18               19               20                 21               22               23               24               25               26               27                 28               29               30               31                   Date Details   No additional details    Date of next INR:  10/12/2018         How to take your warfarin dose     To take:  2.5 mg Take 0.5 of a 5 mg tablet.    To take:  5 mg Take 1 of the 5 mg tablets.

## 2018-10-12 ENCOUNTER — ANTICOAGULATION THERAPY VISIT (OUTPATIENT)
Dept: NURSING | Facility: CLINIC | Age: 73
End: 2018-10-12
Payer: COMMERCIAL

## 2018-10-12 ENCOUNTER — TELEPHONE (OUTPATIENT)
Dept: NURSING | Facility: CLINIC | Age: 73
End: 2018-10-12

## 2018-10-12 DIAGNOSIS — Z86.718 PERSONAL HISTORY OF DVT (DEEP VEIN THROMBOSIS): ICD-10-CM

## 2018-10-12 DIAGNOSIS — D68.51 FACTOR V LEIDEN MUTATION (H): Primary | ICD-10-CM

## 2018-10-12 DIAGNOSIS — Z23 NEED FOR PROPHYLACTIC VACCINATION AND INOCULATION AGAINST INFLUENZA: Primary | ICD-10-CM

## 2018-10-12 DIAGNOSIS — D68.51 FACTOR V LEIDEN MUTATION (H): ICD-10-CM

## 2018-10-12 LAB — INR POINT OF CARE: 2 (ref 0.86–1.14)

## 2018-10-12 PROCEDURE — 99207 ZZC NO CHARGE NURSE ONLY: CPT

## 2018-10-12 PROCEDURE — G0008 ADMIN INFLUENZA VIRUS VAC: HCPCS

## 2018-10-12 PROCEDURE — 85610 PROTHROMBIN TIME: CPT | Mod: QW

## 2018-10-12 PROCEDURE — 90662 IIV NO PRSV INCREASED AG IM: CPT

## 2018-10-12 PROCEDURE — 36416 COLLJ CAPILLARY BLOOD SPEC: CPT

## 2018-10-12 NOTE — TELEPHONE ENCOUNTER
Has the patient previously taken warfarin? yes  If yes, for what indication? Factor V Leiden    Does the patient have any of the following indications for a higher range of 2.5-3.5:    Mitral position mechanical valve? no    Gabriella-Shiley, Ball and Cage or Monoleaflet valve (regardless of position) no    Other (if yes, please explain) no    Annual INR referral needed.  Orders teed up.    Emilie José RN - [facility]

## 2018-10-12 NOTE — MR AVS SNAPSHOT
Kathya HO Saeid   10/12/2018 9:15 AM   Anticoagulation Therapy Visit    Description:  72 year old female   Provider:  [facility]   Department:  [facility]           INR as of 10/12/2018     Today's INR 2.0      Anticoagulation Summary as of 10/12/2018     INR goal 1.5-2.0   Today's INR 2.0   Full warfarin instructions 5 mg on Mon, Fri; 2.5 mg all other days   Next INR check 11/23/2018    Indications   Long-term (current) use of anticoagulants [Z79.01] [Z79.01]  Personal history of DVT (deep vein thrombosis) [Z86.718]  Factor V Leiden Heterozygote [D68.51]         Your next Anticoagulation Clinic appointment(s)     Nov 23, 2018  9:15 AM CST   Anticoagulation Visit with [facility] ANTI EMMA   [facility] ([address] [id number] [phone]              Contact Numbers     [facility]  Please call [phone] to cancel and/or reschedule your appointment   Please call [phone] with any problems or questions regarding your therapy.        October 2018 Details    Sun Mon Tue Wed Thu Fri Sat      1               2               3               4               5               6                 7               8               9               10               11               12      5 mg   See details      13      2.5 mg           14      2.5 mg         15      5 mg         16      2.5 mg         17      2.5 mg         18      2.5 mg         19      5 mg         20      2.5 mg           21      2.5 mg         22      5 mg         23      2.5 mg         24      2.5 mg         25      2.5 mg         26      5 mg         27      2.5 mg           28      2.5 mg         29      5 mg         30      2.5 mg         31      2.5 mg             Date Details   10/12 This INR check               How to take your warfarin dose     To take:  2.5 mg Take 0.5 of a 5 mg tablet.    To take:  5 mg Take 1 of the 5 mg tablets.           November 2018 Details    Sun Mon Tue  Wed Thu Fri Sat         1      2.5 mg         2      5 mg         3      2.5 mg           4      2.5 mg         5      5 mg         6      2.5 mg         7      2.5 mg         8      2.5 mg         9      5 mg         10      2.5 mg           11      2.5 mg         12      5 mg         13      2.5 mg         14      2.5 mg         15      2.5 mg         16      5 mg         17      2.5 mg           18      2.5 mg         19      5 mg         20      2.5 mg         21      2.5 mg         22      2.5 mg         23            24                 25               26               27               28               29               30                 Date Details   No additional details    Date of next INR:  11/23/2018         How to take your warfarin dose     To take:  2.5 mg Take 0.5 of a 5 mg tablet.    To take:  5 mg Take 1 of the 5 mg tablets.

## 2018-10-12 NOTE — PROGRESS NOTES
Injectable Influenza Immunization Documentation    1.  Is the person to be vaccinated sick today?   No    2. Does the person to be vaccinated have an allergy to a component   of the vaccine?   No  Egg Allergy Algorithm Link    3. Has the person to be vaccinated ever had a serious reaction   to influenza vaccine in the past?   No    4. Has the person to be vaccinated ever had Guillain-Barré syndrome?   No    Form completed by Emilie José RN - BC               ANTICOAGULATION FOLLOW-UP CLINIC VISIT    Patient Name:  Kathya Breaux  Date:  10/12/2018  Contact Type:  Face to Face    SUBJECTIVE:     Patient Findings     Positives No Problem Findings    Comments Bleeding Signs/Symptoms: NO  Thromboembolic Signs/Symptoms: NO     Medication Changes:  NO  Dietary Changes:  NO  Bacterial/Viral Infection: NO     Missed Coumadin Doses: NO  Other Concerns:  NO             OBJECTIVE    INR Protime   Date Value Ref Range Status   10/12/2018 2.0 (A) 0.86 - 1.14 Final       ASSESSMENT / PLAN  INR assessment THER    Recheck INR In: 6 WEEKS    INR Location Clinic      Anticoagulation Summary as of 10/12/2018     INR goal 1.5-2.0   Today's INR 2.0   Warfarin maintenance plan 5 mg (5 mg x 1) on Mon, Fri; 2.5 mg (5 mg x 0.5) all other days   Full warfarin instructions 5 mg on Mon, Fri; 2.5 mg all other days   Weekly warfarin total 22.5 mg   No change documented Emilie José RN   Plan last modified Emilie José RN (12/15/2017)   Next INR check 11/23/2018   Priority INR   Target end date Indefinite    Indications   Long-term (current) use of anticoagulants [Z79.01] [Z79.01]  Personal history of DVT (deep vein thrombosis) [Z86.718]  Factor V Leiden Heterozygote [D68.51]         Anticoagulation Episode Summary     INR check location     Preferred lab     Send INR reminders to [facility] CL[facility]    Comments       Anticoagulation Care Providers     Provider Role Specialty Phone number    Azalia Daugherty MD Responsible Family  Practice [phone]            See the Encounter Report to view Anticoagulation Flowsheet and Dosing Calendar (Go to Encounters tab in chart review, and find the Anticoagulation Therapy Visit)    Dosage adjustment made based on physician directed care plan.    Emilie José RN

## 2018-10-15 ENCOUNTER — OFFICE VISIT (OUTPATIENT)
Dept: OTOLARYNGOLOGY | Facility: CLINIC | Age: 73
End: 2018-10-15
Payer: COMMERCIAL

## 2018-10-15 VITALS
DIASTOLIC BLOOD PRESSURE: 68 MMHG | WEIGHT: 144 LBS | BODY MASS INDEX: 25.11 KG/M2 | SYSTOLIC BLOOD PRESSURE: 121 MMHG | OXYGEN SATURATION: 96 % | HEART RATE: 85 BPM

## 2018-10-15 DIAGNOSIS — H61.23 BILATERAL IMPACTED CERUMEN: Primary | ICD-10-CM

## 2018-10-15 PROCEDURE — 69210 REMOVE IMPACTED EAR WAX UNI: CPT | Performed by: OTOLARYNGOLOGY

## 2018-10-15 PROCEDURE — 99213 OFFICE O/P EST LOW 20 MIN: CPT | Mod: 25 | Performed by: OTOLARYNGOLOGY

## 2018-10-15 NOTE — MR AVS SNAPSHOT
After Visit Summary   10/15/2018    Kathya Breaux    MRN: [id number]           Patient Information     Date Of Birth          1945        Visit Information        Provider Department      10/15/2018 1:00 PM Oscar Moore MD [facility]        Today's Diagnoses     Bilateral impacted cerumen    -  1      Care Instructions    Scheduling Information  To schedule your CT/MRI scan, please contact [facility] at [phone] OR [facility] at [phone]    To schedule your Surgery, please contact our Specialty Schedulers at [phone]      ENT Clinic Locations Clinic Hours Telephone Number     [facility] [place] [facility]  [address]  [place] [address]   Monday:           1:00pm -- 5:00pm    Friday:              8:00am - 12:00pm   To schedule/reschedule an appointment with   Dr. Moore,   please contact our   Specialty Scheduling Department at:     [phone]       [facility]  [address]  [place] [address] Tuesday:          8:00am -- 2:00pm         Urgent Care Locations Clinic Hours Telephone Numbers     [facility]  [address] [place] [address]     Monday-Friday:     11:00am - 9:00pm    Saturday-Sunday:  9:00am - 5:00pm   [phone]     [facility]  [address] [place] [address]     Monday-Friday:      5:00pm - 9:00pm     Saturday-Sunday:  9:00am - 5:00pm   [phone]                 Follow-ups after your visit        Your next 10 appointments already scheduled     Oct 22, 2018 10:00 AM CDT   PHYSICAL with Azalia Daugherty MD   [facility] ([facility])    [address]  [place] [address] [phone]            Nov 23, 2018  9:15 AM CST   Anticoagulation Visit with FZ ANTI COAG   [facility] ([facility])    [address] [place] [address] [phone]              Who to contact     If you have questions or  need follow up information about today's clinic visit or your schedule please contact [facility] directly at [phone].  Normal or non-critical lab and imaging results will be communicated to you by MyChart, letter or phone within 4 business days after the clinic has received the results. If you do not hear from us within 7 days, please contact the clinic through [employer] or phone. If you have a critical or abnormal lab result, we will notify you by phone as soon as possible.  Submit refill requests through [employer] or call your pharmacy and they will forward the refill request to us. Please allow 3 business days for your refill to be completed.          Additional Information About Your Visit        [employer] Information     [employer] gives you secure access to your electronic health record. If you see a primary care provider, you can also send messages to your care team and make appointments. If you have questions, please call your primary care clinic.  If you do not have a primary care provider, please call [phone] and they will assist you.        Care EveryWhere ID     This is your Care EveryWhere ID. This could be used by other organizations to access your [facility] medical records  [id number]        Your Vitals Were     Pulse Pulse Oximetry BMI (Body Mass Index)             85 96% 25.11 kg/m2          Blood Pressure from Last 3 Encounters:   10/15/18 121/68   10/03/17 122/72   08/28/17 107/59    Weight from Last 3 Encounters:   10/15/18 65.3 kg (144 lb)   10/03/17 62.8 kg (138 lb 8 oz)   08/28/17 64 kg (141 lb)              We Performed the Following     Remove [facility]        Primary Care Provider Office Phone # Fax #    Azalia Daugherty [phone] [address] [address]        Equal Access to Services     FERMÍN KAMINSKI : Brown Pantoja, antoine mckeon, qaybolivia ruggiero, nika james. So  [facility] [phone].    ATENCIÓN: Si caren hurley, tiene a parrish disposición servicios gratuitos de asistencia lingüística. Mark munoz [phone].    We comply with applicable federal civil rights laws and [place] laws. We do not discriminate on the basis of race, color, national origin, age, disability, sex, sexual orientation, or gender identity.            Thank you!     Thank you for choosing [facility]  for your care. Our goal is always to provide you with excellent care. Hearing back from our patients is one way we can continue to improve our services. Please take a few minutes to complete the written survey that you may receive in the mail after your visit with us. Thank you!             Your Updated Medication List - Protect others around you: Learn how to safely use, store and throw away your medicines at www.disposemymeds.org.          This list is accurate as of 10/15/18  1:05 PM.  Always use your most recent med list.                   Brand Name Dispense Instructions for use Diagnosis    CALCIUM 500 + D 500-200 MG-IU Tabs      1 TABLET DAILY        IMURAN 50 MG tablet   Generic drug:  azaTHIOprine      1 TABLET DAILY    Routine gynecological examination       Multi-vitamin Tabs tablet   Generic drug:  multivitamin, therapeutic with minerals      1 TABLET DAILY        OMEGA 3-6-9 FATTY ACIDS PO      1 TABLET DAILY        omeprazole 20 MG CR capsule    priLOSEC     1 CAPSULE DAILY    Routine gynecological examination       Vitamin B-12 1000 MCG/ML Soln      1000 MCG Monthly    Routine gynecological examination       Vitamin-B Complex Tabs      1 tablet daily        warfarin 5 MG tablet    COUMADIN    90 tablet    Take 1 tab M,W,F. Take 1/2 tablet the rest of the week or as directed    Personal history of venous thrombosis and embolism, Primary hypercoagulable state (H)

## 2018-10-15 NOTE — PROGRESS NOTES
History of Present Illness - Kathya Breaux is a 72 year old female last seen on 8/28/2017, and prior to that 8/22/2016, for procedural removal of severe cerumen impaction.  She is here for one year follow up and ear check.    She reports that her ears have progressively felt stuffy again, especially over the last month. However, no ear pain, no blood or purulence from the ears.  She denies any vertigo or headache, but the hearing is stuffy.    Past Medical History -   Patient Active Problem List   Diagnosis     Personal history of DVT (deep vein thrombosis)     Factor V Leiden Heterozygote     CARDIOVASCULAR SCREENING; LDL GOAL LESS THAN 130     Long-term (current) use of anticoagulants [Z79.01]     Posterior vitreous detachment, bilateral     Hx of LASIK     Crohn's disease of both small and large intestine with other complication (H)       Current Medications -   Current Outpatient Prescriptions:      CALCIUM 500 + D 500-200 MG-IU OR TABS, 1 TABLET DAILY, Disp: , Rfl: 0     IMURAN 50 MG OR TABS, 1 TABLET DAILY, Disp: , Rfl:      MULTI-VITAMIN OR TABS, 1 TABLET DAILY, Disp: , Rfl: 0     OMEGA 3-6-9 FATTY ACIDS OR, 1 TABLET DAILY, Disp: , Rfl:      OMEPRAZOLE 20 MG OR CPDR, 1 CAPSULE DAILY, Disp: , Rfl:      VITAMIN B-12 1000 MCG/ML IJ SOLN, 1000 MCG Monthly, Disp: , Rfl: 0     VITAMIN-B COMPLEX OR TABS, 1 tablet daily, Disp: , Rfl:      warfarin (COUMADIN) 5 MG tablet, Take 1 tab M,W,F. Take 1/2 tablet the rest of the week or as directed, Disp: 90 tablet, Rfl: 3    Allergies -   Allergies   Allergen Reactions     Penicillins Hives     Throat closed       Social History -   Social History     Social History     Marital status:      Spouse name: N/A     Number of children: N/A     Years of education: N/A     Social History Main Topics     Smoking status: Former Smoker     Packs/day: 0.50     Years: 38.00     Quit date: 3/1/2008     Smokeless tobacco: Never Used      Comment: 6 cigs per day     Alcohol use  No     Drug use: No     Sexual activity: Yes     Partners: Male     Other Topics Concern     Not on file     Social History Narrative    Caffeine intake/servings daily - 2-3    Calcium intake/servings daily - 1-2+ plus 1000mg calcium supp    Exercise 0 times weekly - describe    Sunscreen used - Yes    Seatbelts used - Yes    Guns stored in the home - No    Self Breast Exam - Yes    Pap test up to date -  Yes     Eye exam up to date -  Yes    Dental exam up to date -  Yes    DEXA scan up to date -  2005    Flex Sig/Colonoscopy up to date -  Yes 2006    Mammography up to date -  Yes 4/2006    Immunizations reviewed and up to date - Yes 1998    Abuse: Current or Past (Physical, Sexual or Emotional) - No    Do you feel safe in your environment - Yes    Do you cope well with stress - Yes    Do you suffer from insomnia - No    Last updated by: Char Strauss 3/8/2007       Family History -   Family History   Problem Relation Age of Onset     HEART DISEASE Father      dysrythmia's     Glaucoma Father      HEART DISEASE Son      clogged arteries     Cardiovascular Son      sudden heart attack     Circulatory Mother      phelbities     Unknown/Adopted Maternal Grandmother      Gynecology Maternal Grandfather      Cerebrovascular Disease Paternal Grandmother      Cerebrovascular Disease Paternal Grandfather      Alcohol/Drug Brother      alcohol and drugs     Alcohol/Drug Sister      alcohol and drugs       Review of Systems - As per HPI and PMHx, otherwise 10+ system review of the head and neck, and general constitution is negative.    Physical Exam  /68 (BP Location: Right arm, Cuff Size: Adult Regular)  Pulse 85  Wt 65.3 kg (144 lb)  SpO2 96%  BMI 25.11 kg/m2    General - The patient is well nourished and well developed, and appears to have good nutritional status.  Alert and oriented to person and place, answers questions and cooperates with examination appropriately.   Head and Face - Normocephalic and  atraumatic, with no gross asymmetry noted of the contour of the facial features.  The facial nerve is intact, with strong symmetric movements.  Voice and Breathing - The patient was breathing comfortably without the use of accessory muscles. There was no wheezing, stridor, or stertor.  The patients voice was clear and strong, and had appropriate pitch and quality.  Eyes - Extraocular movements intact, and the pupils were reactive to light.  Sclera were not icteric or injected, conjunctiva were pink and moist.  Mouth - Examination of the oral cavity showed pink, healthy oral mucosa. No lesions or ulcerations noted.  The tongue was mobile and midline, and the dentition were in good condition.    Throat - The walls of the oropharynx were smooth, pink, moist, symmetric, and had no lesions or ulcerations.  The tonsillar pillars and soft palate were symmetric.  The uvula was midline on elevation.    Neck - Normal midline excursion of the laryngotracheal complex during swallowing.  Full range of motion on passive movement.  Palpation of the occipital, submental, submandibular, internal jugular chain, and supraclavicular nodes did not demonstrate any abnormal lymph nodes or masses.  The carotid pulse was palpable bilaterally.  Palpation of the thyroid was soft and smooth, with no nodules or goiter appreciated.  The trachea was mobile and midline.  Nose - External contour is symmetric, no gross deflection or scars.  Nasal mucosa is pink and moist with no abnormal mucus.  The septum was midline and non-obstructive, turbinates of normal size and position.  No polyps, masses, or purulence noted on examination.    Cerumen Removal    Physical Exam and Procedure  Ears - On examination of the ears, I found that the BOTH ears were completely impacted with dense cerumen.  Therefore, I positioned them in the examination chair in a semi-supine position, beginning with the right side.  I used the binocular surgical microscope to perform  cerumen removal.  I began by using a cerumen loop to gently lift the edges of the cerumen mass away from the walls of the external canal.  Once I did this, I was able to suction away fragments of wax and debris using suction.  Once the mass was loose enough, the entire plug was pulled from the canal.  The tympanic membrane was intact, no sign of perforation or middle ear effusion.    I turned my attention to the contralateral side once again using the binocular surgical microscope to perform cerumen removal.  I began by using a cerumen loop to gently lift the edges of the cerumen mass away from the walls of the external canal.  Once I did this, I was able to suction away fragments of wax and debris using suction.  Once the mass was loose enough, the entire plug was pulled from the canal.  The tympanic membrane was intact, no sign of perforation or middle ear effusion.      A/P - Kathya Breaux is a 72 year old female  (H61.23) Bilateral impacted cerumen  (primary encounter diagnosis)  (H93.8X3) Sensation of fullness in both ears    The patient has had their cerumen procedurally removed today.  I have discussed ear care at home, including avoiding qtips or any other object placed in the canal.  I have also discussed that over the counter cerumen kits like Debrox or Cerumenex can be useful.    If no other issues, follow up with me in one year for an ear check.

## 2018-10-15 NOTE — LETTER
10/15/2018         RE: Kathya Breaux  [address]  [facility] [address]        Dear Colleague,    Thank you for referring your patient, Kathya Breaux, to the [facility]. Please see a copy of my visit note below.    History of Present Illness - Kathya Breaux is a 72 year old female last seen on 8/28/2017, and prior to that 8/22/2016, for procedural removal of severe cerumen impaction.  She is here for one year follow up and ear check.    She reports that her ears have progressively felt stuffy again, especially over the last month. However, no ear pain, no blood or purulence from the ears.  She denies any vertigo or headache, but the hearing is stuffy.    Past Medical History -   Patient Active Problem List   Diagnosis     Personal history of DVT (deep vein thrombosis)     Factor V Leiden Heterozygote     CARDIOVASCULAR SCREENING; LDL GOAL LESS THAN 130     Long-term (current) use of anticoagulants [Z79.01]     Posterior vitreous detachment, bilateral     Hx of LASIK     Crohn's disease of both small and large intestine with other complication (H)       Current Medications -   Current Outpatient Prescriptions:      CALCIUM 500 + D 500-200 MG-IU OR TABS, 1 TABLET DAILY, Disp: , Rfl: 0     IMURAN 50 MG OR TABS, 1 TABLET DAILY, Disp: , Rfl:      MULTI-VITAMIN OR TABS, 1 TABLET DAILY, Disp: , Rfl: 0     OMEGA 3-6-9 FATTY ACIDS OR, 1 TABLET DAILY, Disp: , Rfl:      OMEPRAZOLE 20 MG OR CPDR, 1 CAPSULE DAILY, Disp: , Rfl:      VITAMIN B-12 1000 MCG/ML IJ SOLN, 1000 MCG Monthly, Disp: , Rfl: 0     VITAMIN-B COMPLEX OR TABS, 1 tablet daily, Disp: , Rfl:      warfarin (COUMADIN) 5 MG tablet, Take 1 tab M,W,F. Take 1/2 tablet the rest of the week or as directed, Disp: 90 tablet, Rfl: 3    Allergies -   Allergies   Allergen Reactions     Penicillins Hives     Throat closed       Social History -   Social History     Social History     Marital status:      Spouse name: N/A      Number of children: N/A     Years of education: N/A     Social History Main Topics     Smoking status: Former Smoker     Packs/day: 0.50     Years: 38.00     Quit date: 3/1/2008     Smokeless tobacco: Never Used      Comment: 6 cigs per day     Alcohol use No     Drug use: No     Sexual activity: Yes     Partners: Male     Other Topics Concern     Not on file     Social History Narrative    Caffeine intake/servings daily - 2-3    Calcium intake/servings daily - 1-2+ plus 1000mg calcium supp    Exercise 0 times weekly - describe    Sunscreen used - Yes    Seatbelts used - Yes    Guns stored in the home - No    Self Breast Exam - Yes    Pap test up to date -  Yes     Eye exam up to date -  Yes    Dental exam up to date -  Yes    DEXA scan up to date -  2005    Flex Sig/Colonoscopy up to date -  Yes 2006    Mammography up to date -  Yes 4/2006    Immunizations reviewed and up to date - Yes 1998    Abuse: Current or Past (Physical, Sexual or Emotional) - No    Do you feel safe in your environment - Yes    Do you cope well with stress - Yes    Do you suffer from insomnia - No    Last updated by: Char Strauss 3/8/2007       Family History -   Family History   Problem Relation Age of Onset     HEART DISEASE Father      dysrythmia's     Glaucoma Father      HEART DISEASE Son      clogged arteries     Cardiovascular Son      sudden heart attack     Circulatory Mother      phelbities     Unknown/Adopted Maternal Grandmother      Gynecology Maternal Grandfather      Cerebrovascular Disease Paternal Grandmother      Cerebrovascular Disease Paternal Grandfather      Alcohol/Drug Brother      alcohol and drugs     Alcohol/Drug Sister      alcohol and drugs       Review of Systems - As per HPI and PMHx, otherwise 10+ system review of the head and neck, and general constitution is negative.    Physical Exam  /68 (BP Location: Right arm, Cuff Size: Adult Regular)  Pulse 85  Wt 65.3 kg (144 lb)  SpO2 96%  BMI 25.11  kg/m2    General - The patient is well nourished and well developed, and appears to have good nutritional status.  Alert and oriented to person and place, answers questions and cooperates with examination appropriately.   Head and Face - Normocephalic and atraumatic, with no gross asymmetry noted of the contour of the facial features.  The facial nerve is intact, with strong symmetric movements.  Voice and Breathing - The patient was breathing comfortably without the use of accessory muscles. There was no wheezing, stridor, or stertor.  The patients voice was clear and strong, and had appropriate pitch and quality.  Eyes - Extraocular movements intact, and the pupils were reactive to light.  Sclera were not icteric or injected, conjunctiva were pink and moist.  Mouth - Examination of the oral cavity showed pink, healthy oral mucosa. No lesions or ulcerations noted.  The tongue was mobile and midline, and the dentition were in good condition.    Throat - The walls of the oropharynx were smooth, pink, moist, symmetric, and had no lesions or ulcerations.  The tonsillar pillars and soft palate were symmetric.  The uvula was midline on elevation.    Neck - Normal midline excursion of the laryngotracheal complex during swallowing.  Full range of motion on passive movement.  Palpation of the occipital, submental, submandibular, internal jugular chain, and supraclavicular nodes did not demonstrate any abnormal lymph nodes or masses.  The carotid pulse was palpable bilaterally.  Palpation of the thyroid was soft and smooth, with no nodules or goiter appreciated.  The trachea was mobile and midline.  Nose - External contour is symmetric, no gross deflection or scars.  Nasal mucosa is pink and moist with no abnormal mucus.  The septum was midline and non-obstructive, turbinates of normal size and position.  No polyps, masses, or purulence noted on examination.    Cerumen Removal    Physical Exam and Procedure  Ears - On  examination of the ears, I found that the BOTH ears were completely impacted with dense cerumen.  Therefore, I positioned them in the examination chair in a semi-supine position, beginning with the right side.  I used the binocular surgical microscope to perform cerumen removal.  I began by using a cerumen loop to gently lift the edges of the cerumen mass away from the walls of the external canal.  Once I did this, I was able to suction away fragments of wax and debris using suction.  Once the mass was loose enough, the entire plug was pulled from the canal.  The tympanic membrane was intact, no sign of perforation or middle ear effusion.    I turned my attention to the contralateral side once again using the binocular surgical microscope to perform cerumen removal.  I began by using a cerumen loop to gently lift the edges of the cerumen mass away from the walls of the external canal.  Once I did this, I was able to suction away fragments of wax and debris using suction.  Once the mass was loose enough, the entire plug was pulled from the canal.  The tympanic membrane was intact, no sign of perforation or middle ear effusion.      A/P - Kathya Breaux is a 72 year old female  (H61.23) Bilateral impacted cerumen  (primary encounter diagnosis)  (H93.8X3) Sensation of fullness in both ears    The patient has had their cerumen procedurally removed today.  I have discussed ear care at home, including avoiding qtips or any other object placed in the canal.  I have also discussed that over the counter cerumen kits like Debrox or Cerumenex can be useful.    If no other issues, follow up with me in one year for an ear check.        Again, thank you for allowing me to participate in the care of your patient.        Sincerely,        Oscar Moore MD

## 2018-10-15 NOTE — PATIENT INSTRUCTIONS
Scheduling Information  To schedule your CT/MRI scan, please contact [facility] at [phone] OR [facility] at [phone]    To schedule your Surgery, please contact our Specialty Schedulers at [phone]      ENT Clinic Locations Clinic Hours Telephone Number     Mehreen Kumar  [address] [place] [address]   Monday:           1:00pm -- 5:00pm    Friday:              8:00am - 12:00pm   To schedule/reschedule an appointment with   Dr. Moore,   please contact our   Specialty Scheduling Department at:     [phone]       Mehreen Cordova  [address] [place] [address] Tuesday:          8:00am -- 2:00pm         Urgent Care Locations Clinic Hours Telephone Numbers     Mehreen Cordova  [address] [place] [address]     Monday-Friday:     11:00am - 9:00pm    Saturday-Sunday:  9:00am - 5:00pm   [phone]     [facility]  [address] [place] [address]     Monday-Friday:      5:00pm - 9:00pm     Saturday-Sunday:  9:00am - 5:00pm   [phone]

## 2018-10-17 NOTE — PATIENT INSTRUCTIONS
Preventive Health Recommendations  Female Ages 65 +    Yearly exam:     See your health care provider every year in order to  o Review health changes.   o Discuss preventive care.    o Review your medicines if your doctor has prescribed any.      You no longer need a yearly Pap test unless you've had an abnormal Pap test in the past 10 years. If you have vaginal symptoms, such as bleeding or discharge, be sure to talk with your provider about a Pap test.      Every 1 to 2 years, have a mammogram.  If you are over 69, talk with your health care provider about whether or not you want to continue having screening mammograms.      Every 10 years, have a colonoscopy. Or, have a yearly FIT test (stool test). These exams will check for colon cancer.       Have a cholesterol test every 5 years, or more often if your doctor advises it.       Have a diabetes test (fasting glucose) every three years. If you are at risk for diabetes, you should have this test more often.       At age 65, have a bone density scan (DEXA) to check for osteoporosis (brittle bone disease).    Shots:    Get a flu shot each year.    Get a tetanus shot every 10 years.    Talk to your doctor about your pneumonia vaccines. There are now two you should receive - Pneumovax (PPSV 23) and Prevnar (PCV 13).    Talk to your pharmacist about the shingles vaccine.    Talk to your doctor about the hepatitis B vaccine.    Nutrition:     Eat at least 5 servings of fruits and vegetables each day.      Eat whole-grain bread, whole-wheat pasta and brown rice instead of white grains and rice.      Get adequate about Calcium and Vitamin D.     Lifestyle    Exercise at least 150 minutes a week (30 minutes a day, 5 days a week). This will help you control your weight and prevent disease.      Limit alcohol to one drink per day.      No smoking.       Wear sunscreen to prevent skin cancer.       See your dentist twice a year for an exam and cleaning.      See your eye  doctor every 1 to 2 years to screen for conditions such as glaucoma, macular degeneration, cataracts, etc     [facility]    If you have any questions regarding to your visit please contact your care team:       Team Purple:   Clinic Hours Telephone Number   Dr. Azalia Grossman   7am-7pm  Monday - Thursday   7am-5pm  Fridays  [phone]  (Appointment scheduling available 24/7)   Urgent Care - [place] and [facility] - 11am-9pm Monday-Friday Saturday-Sunday- 9am-5pm   [place] - 5pm-9pm Monday-Friday Saturday-Sunday- 9am-5pm  [phone] - [place]  [phone] - [place]       What options do I have for a visit other than an office visit? We offer electronic visits (e-visits) and telephone visits, when medically appropriate.  Please check with your medical insurance to see if these types of visits are covered, as you will be responsible for any charges that are not paid by your insurance.      You can use Navut (secure electronic communication) to access to your chart, send your primary care provider a message, or make an appointment. Ask a team member how to get started.     For a price quote for your services, please call our Consumer Price Line at [phone] or our Imaging Cost estimation line at [phone] (for imaging tests).

## 2018-10-19 ASSESSMENT — ENCOUNTER SYMPTOMS
EYE PAIN: 0
ABDOMINAL PAIN: 0
DYSURIA: 0
MYALGIAS: 0
DIARRHEA: 0
PARESTHESIAS: 0
CONSTIPATION: 0
SORE THROAT: 0
HEADACHES: 0
HEMATURIA: 0
WEAKNESS: 0
FEVER: 0
NAUSEA: 0
ARTHRALGIAS: 1
HEMATOCHEZIA: 0
HEARTBURN: 0
FREQUENCY: 0
DIZZINESS: 0
PALPITATIONS: 0
JOINT SWELLING: 0
CHILLS: 0
SHORTNESS OF BREATH: 0
NERVOUS/ANXIOUS: 0
COUGH: 0
BREAST MASS: 0

## 2018-10-19 ASSESSMENT — ACTIVITIES OF DAILY LIVING (ADL)
I_NEED_ASSISTANCE_FOR_THE_FOLLOWING_DAILY_ACTIVITIES:: NO ASSISTANCE IS NEEDED
CURRENT_FUNCTION: NO ASSISTANCE NEEDED

## 2018-10-22 ENCOUNTER — OFFICE VISIT (OUTPATIENT)
Dept: FAMILY MEDICINE | Facility: CLINIC | Age: 73
End: 2018-10-22
Payer: COMMERCIAL

## 2018-10-22 VITALS
WEIGHT: 143.8 LBS | RESPIRATION RATE: 16 BRPM | HEIGHT: 63 IN | OXYGEN SATURATION: 98 % | BODY MASS INDEX: 25.48 KG/M2 | SYSTOLIC BLOOD PRESSURE: 130 MMHG | TEMPERATURE: 97.8 F | DIASTOLIC BLOOD PRESSURE: 80 MMHG | HEART RATE: 93 BPM

## 2018-10-22 DIAGNOSIS — Z00.00 ENCOUNTER FOR ROUTINE ADULT HEALTH EXAMINATION WITHOUT ABNORMAL FINDINGS: Primary | ICD-10-CM

## 2018-10-22 DIAGNOSIS — D68.59 PRIMARY HYPERCOAGULABLE STATE (H): ICD-10-CM

## 2018-10-22 DIAGNOSIS — Z78.0 ASYMPTOMATIC POSTMENOPAUSAL STATUS: ICD-10-CM

## 2018-10-22 DIAGNOSIS — K50.818 CROHN'S DISEASE OF BOTH SMALL AND LARGE INTESTINE WITH OTHER COMPLICATION (H): ICD-10-CM

## 2018-10-22 DIAGNOSIS — M54.41 RIGHT-SIDED LOW BACK PAIN WITH RIGHT-SIDED SCIATICA, UNSPECIFIED CHRONICITY: ICD-10-CM

## 2018-10-22 DIAGNOSIS — Z86.718 PERSONAL HISTORY OF VENOUS THROMBOSIS AND EMBOLISM: ICD-10-CM

## 2018-10-22 DIAGNOSIS — Z12.31 VISIT FOR SCREENING MAMMOGRAM: ICD-10-CM

## 2018-10-22 PROCEDURE — G0439 PPPS, SUBSEQ VISIT: HCPCS | Performed by: FAMILY MEDICINE

## 2018-10-22 PROCEDURE — 99207 C PAF COMPLETED  NO CHARGE: CPT | Performed by: FAMILY MEDICINE

## 2018-10-22 RX ORDER — WARFARIN SODIUM 5 MG/1
TABLET ORAL
Qty: 90 TABLET | Refills: 3 | Status: SHIPPED | OUTPATIENT
Start: 2018-10-22 | End: 2018-11-27 | Stop reason: ALTCHOICE

## 2018-10-22 ASSESSMENT — ENCOUNTER SYMPTOMS
FREQUENCY: 0
NAUSEA: 0
MYALGIAS: 0
DIARRHEA: 0
PARESTHESIAS: 0
CHILLS: 0
WEAKNESS: 0
COUGH: 0
HEMATOCHEZIA: 0
DIZZINESS: 0
SORE THROAT: 0
JOINT SWELLING: 0
HEMATURIA: 0
HEARTBURN: 0
SHORTNESS OF BREATH: 0
FEVER: 0
ABDOMINAL PAIN: 0
NERVOUS/ANXIOUS: 0
DYSURIA: 0
BREAST MASS: 0
CONSTIPATION: 0
ARTHRALGIAS: 1
PALPITATIONS: 0
HEADACHES: 0
EYE PAIN: 0

## 2018-10-22 ASSESSMENT — ACTIVITIES OF DAILY LIVING (ADL): CURRENT_FUNCTION: NO ASSISTANCE NEEDED

## 2018-10-22 NOTE — MR AVS SNAPSHOT
After Visit Summary   10/22/2018    Kathya Breaux    MRN: [id number]           Patient Information     Date Of Birth          1945        Visit Information        Provider Department      10/22/2018 10:00 AM Azalia Daugherty MD [facility]        Today's Diagnoses     Encounter for routine adult health examination without abnormal findings    -  1    Personal history of venous thrombosis and embolism        Primary hypercoagulable state (H)        Right-sided low back pain with right-sided sciatica, unspecified chronicity        Crohn's disease of both small and large intestine with other complication (H)        Visit for screening mammogram        Asymptomatic postmenopausal status        CARDIOVASCULAR SCREENING; LDL GOAL LESS THAN 130          Care Instructions      Preventive Health Recommendations  Female Ages 65 +    Yearly exam:     See your health care provider every year in order to  o Review health changes.   o Discuss preventive care.    o Review your medicines if your doctor has prescribed any.      You no longer need a yearly Pap test unless you've had an abnormal Pap test in the past 10 years. If you have vaginal symptoms, such as bleeding or discharge, be sure to talk with your provider about a Pap test.      Every 1 to 2 years, have a mammogram.  If you are over 69, talk with your health care provider about whether or not you want to continue having screening mammograms.      Every 10 years, have a colonoscopy. Or, have a yearly FIT test (stool test). These exams will check for colon cancer.       Have a cholesterol test every 5 years, or more often if your doctor advises it.       Have a diabetes test (fasting glucose) every three years. If you are at risk for diabetes, you should have this test more often.       At age 65, have a bone density scan (DEXA) to check for osteoporosis (brittle bone disease).    Shots:    Get a flu shot each year.    Get a tetanus  shot every 10 years.    Talk to your doctor about your pneumonia vaccines. There are now two you should receive - Pneumovax (PPSV 23) and Prevnar (PCV 13).    Talk to your pharmacist about the shingles vaccine.    Talk to your doctor about the hepatitis B vaccine.    Nutrition:     Eat at least 5 servings of fruits and vegetables each day.      Eat whole-grain bread, whole-wheat pasta and brown rice instead of white grains and rice.      Get adequate about Calcium and Vitamin D.     Lifestyle    Exercise at least 150 minutes a week (30 minutes a day, 5 days a week). This will help you control your weight and prevent disease.      Limit alcohol to one drink per day.      No smoking.       Wear sunscreen to prevent skin cancer.       See your dentist twice a year for an exam and cleaning.      See your eye doctor every 1 to 2 years to screen for conditions such as glaucoma, macular degeneration, cataracts, etc     [facility]    If you have any questions regarding to your visit please contact your care team:       Team Purple:   Clinic Hours Telephone Number   Dr. Azalia Grossman   7am-7pm  Monday - Thursday   7am-5pm  Fridays  [phone]  (Appointment scheduling available 24/7)   [facility] [place] [facility] - 11am-9pm Monday-Friday Saturday-Sunday- 9am-5pm   [place] - 5pm-9pm Monday-Friday Saturday-Sunday- 9am-5pm  [phone] - Gwendolyn Cordova  [phone] - [place]       What options do I have for a visit other than an office visit? We offer electronic visits (e-visits) and telephone visits, when medically appropriate.  Please check with your medical insurance to see if these types of visits are covered, as you will be responsible for any charges that are not paid by your insurance.      You can use [email or web address] (secure electronic communication) to access to your chart, send your primary care provider a message, or make an appointment.  Ask a team member how to get started.     For a price quote for your services, please call our Consumer Price Line at [phone] or our Imaging Cost estimation line at [phone] (for imaging tests).              Follow-ups after your visit        Additional Services     [facility] PT, HAND, AND CHIROPRACTIC REFERRAL       Physical Therapy, Hand Therapy and Chiropractic Care are available through:  *[facility]  *Hand Therapy (Occupational Therapy or Physical Therapy)  *[facility]    Call one number to schedule at any of the above locations: [phone].    Physical therapy, Hand therapy and/or Chiropractic care has been recommended by your physician as an excellent treatment option to reduce pain and help people return to normal activities, including sports.  Therapy and/or chiropractic care services are a great complement or alternative to expensive and invasive surgery, injections, or long-term use of prescription medications. The primary goal is to identify the underlying problem and provide you the tools to manage your condition on your own.     Please be aware that coverage of these services is subject to the terms and limitations of your health insurance plan.  Call member services at your health plan with any benefit or coverage questions.      Please bring the following to your appointment:  *Your personal calendar for scheduling future appointments  *Comfortable clothing                  Your next 10 appointments already scheduled     Nov 23, 2018  9:15 AM CST   Anticoagulation Visit with MINESH ANTI COAG   [facility] [place] ([facility])    [address]  [place] [address]   [phone]              Future tests that were ordered for you today     Open Future Orders        Priority Expected Expires Ordered    [facility] PT, HAND, AND CHIROPRACTIC REFERRAL Routine  10/22/2019 10/22/2018    DX Hip/Pelvis/Spine Routine  10/22/2019 10/22/2018  "   MA SCREENING DIGITAL BILAT - Future  (s+30) Routine  10/17/2019 10/22/2018            Who to contact     If you have questions or need follow up information about today's clinic visit or your schedule please contact [facility] [place] directly at [phone].  Normal or non-critical lab and imaging results will be communicated to you by MyChart, letter or phone within 4 business days after the clinic has received the results. If you do not hear from us within 7 days, please contact the clinic through [employer] or phone. If you have a critical or abnormal lab result, we will notify you by phone as soon as possible.  Submit refill requests through [employer] or call your pharmacy and they will forward the refill request to us. Please allow 3 business days for your refill to be completed.          Additional Information About Your Visit        MyChart Information     [employer] gives you secure access to your electronic health record. If you see a primary care provider, you can also send messages to your care team and make appointments. If you have questions, please call your primary care clinic.  If you do not have a primary care provider, please call [phone] and they will assist you.        Care EveryWhere ID     This is your Care EveryWhere ID. This could be used by other organizations to access your [facility] medical records  [id number]        Your Vitals Were     Pulse Temperature Respirations Height Pulse Oximetry BMI (Body Mass Index)    93 97.8  F (36.6  C) (Oral) 16 5' 3.47\" (1.612 m) 98% 25.1 kg/m2       Blood Pressure from Last 3 Encounters:   10/22/18 130/80   10/15/18 121/68   10/03/17 122/72    Weight from Last 3 Encounters:   10/22/18 143 lb 12.8 oz (65.2 kg)   10/15/18 144 lb (65.3 kg)   10/03/17 138 lb 8 oz (62.8 kg)              We Performed the Following     PAF COMPLETED          Where to get your medicines      These medications were sent to [facility] [address] [address] [facility] [address]  [address] [address]     Phone:  [phone]     warfarin 5 MG tablet          Primary Care Provider Office Phone # Fax #    Azalia Daugherty [phone] [address]  [place] [address]        Equal Access to Services     PHI KAMINSKI : Hadii aad ku hadasho Soomaali, waaxda luqadaha, qaybta kaalmada adeegyada, waxay idiin hayaan adeeg kharash la'aan ah. So [facility] [phone].    ATENCIÓN: Si habla espadam, tiene a parrish disposición servicios gratuitos de asistencia lingüística. Llame al [phone].    We comply with applicable federal civil rights laws and [place] laws. We do not discriminate on the basis of race, color, national origin, age, disability, sex, sexual orientation, or gender identity.            Thank you!     Thank you for choosing [facility]  for your care. Our goal is always to provide you with excellent care. Hearing back from our patients is one way we can continue to improve our services. Please take a few minutes to complete the written survey that you may receive in the mail after your visit with us. Thank you!             Your Updated Medication List - Protect others around you: Learn how to safely use, store and throw away your medicines at www.disposemymeds.org.          This list is accurate as of 10/22/18 10:22 AM.  Always use your most recent med list.                   Brand Name Dispense Instructions for use Diagnosis    CALCIUM 500 + D 500-200 MG-IU Tabs      1 TABLET DAILY        IMURAN 50 MG tablet   Generic drug:  azaTHIOprine      1 TABLET DAILY    Routine gynecological examination       Multi-vitamin Tabs tablet   Generic drug:  multivitamin, therapeutic with minerals      1 TABLET DAILY        OMEGA 3-6-9 FATTY ACIDS PO      1 TABLET DAILY        omeprazole 20 MG CR capsule    priLOSEC     1 CAPSULE DAILY    Routine gynecological examination       Vitamin B-12 1000 MCG/ML Soln       1000 MCG Monthly    Routine gynecological examination       Vitamin-B Complex Tabs      1 tablet daily        warfarin 5 MG tablet    COUMADIN    90 tablet    Take 1 tab M,W,F. Take 1/2 tablet the rest of the week or as directed    Personal history of venous thrombosis and embolism, Primary hypercoagulable state (H)

## 2018-10-22 NOTE — PROGRESS NOTES
SUBJECTIVE:   Kathya Breaux is a 72 year old female who presents for Preventive Visit.      Are you in the first 12 months of your Medicare coverage?  No    Physical   Annual:     Getting at least 3 servings of Calcium per day:  Yes    Bi-annual eye exam:  Yes    Dental care twice a year:  Yes    Sleep apnea or symptoms of sleep apnea:  None    Frequency of exercise:  2-3 days/week    Duration of exercise:  Less than 15 minutes    Taking medications regularly:  Yes    Medication side effects:  None    Additional concerns today:  No    Ability to successfully perform activities of daily living: no assistance needed    Home Safety:  No safety concerns identified    Hearing Impairment: difficulty following a conversation in a noisy restaurant or crowded room, feel that people are mumbling or not speaking clearly, difficult to understand a speaker at a public meeting or Baptism service, need to ask people to speak up or repeat themselves and difficulty understanding soft or whispered speech        Fall risk:  Fallen 2 or more times in the past year?: No  Any fall with injury in the past year?: No    COGNITIVE SCREEN  1) Repeat 3 items (Leader, Season, Table)    2) Clock draw: NORMAL  3) 3 item recall: Recalls 3 objects  Results: 3 items recalled: COGNITIVE IMPAIRMENT LESS LIKELY    Mini-CogTM Copyright S Janae. Licensed by the author for use in [facility]; reprinted with permission (jaiden@[facility]). All rights reserved.        Reviewed and updated as needed this visit by clinical staff  Tobacco  Allergies  Meds  Med Hx  Surg Hx  Fam Hx  Soc Hx        Reviewed and updated as needed this visit by Provider        Social History   Substance Use Topics     Smoking status: Former Smoker     Packs/day: 0.50     Years: 38.00     Types: Cigarettes     Quit date: 3/1/2008     Smokeless tobacco: Never Used      Comment: 6 cigs per day     Alcohol use No       Alcohol Use 10/19/2018   If you drink alcohol do  you typically have greater than 3 drinks per day OR greater than 7 drinks per week? Not Applicable           -------------------------------------    Today's PHQ-2 Score:   PHQ-2 ( 1999 [employer]) 10/19/2018   Q1: Little interest or pleasure in doing things 0   Q2: Feeling down, depressed or hopeless 0   PHQ-2 Score 0   Q1: Little interest or pleasure in doing things Not at all   Q2: Feeling down, depressed or hopeless Not at all   PHQ-2 Score 0       Do you feel safe in your environment - Yes    Do you have a Health Care Directive?: No: Advance care planning was reviewed with patient; patient declined at this time.    Current providers sharing in care for this patient include:   Patient Care Team:  Azalia Daugherty MD as PCP - General ([facility])    The following health maintenance items are reviewed in Epic and correct as of today:  Health Maintenance   Topic Date Due     ADVANCE DIRECTIVE PLANNING Q5 YRS  11/27/2000     OP ANNUAL INR REFERRAL  02/24/2015     FALL RISK ASSESSMENT  10/03/2018     MAMMO Q1 YR  10/27/2018     COLONOSCOPY Q3 YR  05/12/2019     PHQ-2 Q1 YR  10/22/2019     LIPID SCREEN Q5 YR FEMALE (SYSTEM ASSIGNED)  10/16/2022     TETANUS IMMUNIZATION (SYSTEM ASSIGNED)  05/31/2023     DEXA SCAN SCREENING (SYSTEM ASSIGNED)  Completed     PNEUMOCOCCAL  Completed     INFLUENZA VACCINE  Completed     HEPATITIS C SCREENING  Completed     Labs reviewed in EPIC  BP Readings from Last 3 Encounters:   10/22/18 130/80   10/15/18 121/68   10/03/17 122/72    Wt Readings from Last 3 Encounters:   10/22/18 143 lb 12.8 oz (65.2 kg)   10/15/18 144 lb (65.3 kg)   10/03/17 138 lb 8 oz (62.8 kg)                  Patient Active Problem List   Diagnosis     Personal history of DVT (deep vein thrombosis)     Factor V Leiden Heterozygote     CARDIOVASCULAR SCREENING; LDL GOAL LESS THAN 130     Long-term (current) use of anticoagulants [Z79.01]     Posterior vitreous detachment, bilateral     Hx of LASIK     Crohn's  "disease of both small and large intestine with other complication (H)     Past Surgical History:   Procedure Laterality Date     ABDOMEN SURGERY  3 events    Bowel resec. in 1964, bowel resec in 1969     APPENDECTOMY      first bowel surgery     C NONSPECIFIC PROCEDURE  1964    3 1/2\" sm. intestine removed-illeitis     C NONSPECIFIC PROCEDURE  1969    4\" sm. intestine removed-illeitis     C NONSPECIFIC PROCEDURE  1980    Hysterectomy     COLONOSCOPY  5/2016    Q 5 years      ENHANCE LASER REFRACTIVE BILATERAL EXISTING PT IN PARAMETERS       EYE SURGERY  2001    lasik surgery     GYN SURGERY  1980    hysterectomy       Social History   Substance Use Topics     Smoking status: Former Smoker     Packs/day: 0.50     Years: 38.00     Types: Cigarettes     Quit date: 3/1/2008     Smokeless tobacco: Never Used      Comment: 6 cigs per day     Alcohol use No     Family History   Problem Relation Age of Onset     HEART DISEASE Father      dysrythmia's     Glaucoma Father      HEART DISEASE Son      clogged arteries     Cardiovascular Son      sudden heart attack     Circulatory Mother      phelbities     Genetic Disorder Mother      factor V leiden     Unknown/Adopted Maternal Grandmother      Gynecology Maternal Grandfather      Cerebrovascular Disease Paternal Grandmother      Cerebrovascular Disease Paternal Grandfather      Alcohol/Drug Brother      alcohol and drugs     Alcohol/Drug Sister      alcohol and drugs     Breast Cancer Cousin      Colon Cancer Other      Anxiety Disorder Sister      Substance Abuse Sister      Anxiety Disorder Brother      Substance Abuse Brother          Current Outpatient Prescriptions   Medication Sig Dispense Refill     CALCIUM 500 + D 500-200 MG-IU OR TABS 1 TABLET DAILY  0     IMURAN 50 MG OR TABS 1 TABLET DAILY       MULTI-VITAMIN OR TABS 1 TABLET DAILY  0     OMEGA 3-6-9 FATTY ACIDS OR 1 TABLET DAILY       OMEPRAZOLE 20 MG OR CPDR 1 CAPSULE DAILY       VITAMIN B-12 1000 MCG/ML IJ SOLN " 1000 MCG Monthly  0     VITAMIN-B COMPLEX OR TABS 1 tablet daily       warfarin (COUMADIN) 5 MG tablet Take 1 tab M,W,F. Take 1/2 tablet the rest of the week or as directed 90 tablet 3     [DISCONTINUED] warfarin (COUMADIN) 5 MG tablet Take 1 tab M,W,F. Take 1/2 tablet the rest of the week or as directed 90 tablet 3     Allergies   Allergen Reactions     Penicillins Hives     Throat closed          Immunization History   Administered Date(s) Administered     Influenza (High Dose) 3 valent vaccine 10/21/2016, 10/02/2017, 10/12/2018     Pneumo Conj 13-V (2010&after) 10/03/2017     Pneumococcal 23 valent 2013     TDAP Vaccine (Adacel) 2013      This 72 year old female is here today for annual female exam. She is very pleased with Imuran for her Crohns disease. 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 Stays active. Lately, she has noticed some pain in her right sacroiliac joint when she wakes up in the morning. Tends to give radicular pain into her right hip and thigh. Doesn't stop her from doing things she likes to do. Can't take Ibuprofen as she is on lifetime coumadin for factor V. All other review of systems are negative  Personal, family, and social history reviewed with patient and revised.    Review of Systems   Constitutional: Negative for chills and fever.   HENT: Positive for hearing loss. Negative for congestion, ear pain and sore throat.    Eyes: Negative for pain and visual disturbance.   Respiratory: Negative for cough and shortness of breath.    Cardiovascular: Positive for peripheral edema. Negative for chest pain and palpitations.   Gastrointestinal: Negative for abdominal pain, constipation, diarrhea, heartburn, hematochezia and nausea.   Breasts:  Negative for tenderness, breast mass and discharge.   Genitourinary: Negative for dysuria, frequency, genital sores, hematuria, pelvic pain, urgency, vaginal bleeding and vaginal discharge.   Musculoskeletal: Positive for arthralgias.  "Negative for joint swelling and myalgias.   Skin: Negative for rash.   Neurological: Negative for dizziness, weakness, headaches and paresthesias.   Psychiatric/Behavioral: Negative for mood changes. The patient is not nervous/anxious.      CONSTITUTIONAL: NEGATIVE for fever, chills, change in weight  INTEGUMENTARY/SKIN: NEGATIVE for worrisome rashes, moles or lesions  EYES: NEGATIVE for vision changes or irritation  ENT/MOUTH: NEGATIVE for ear, mouth and throat problems  RESP: NEGATIVE for significant cough or SOB  BREAST: NEGATIVE for masses, tenderness or discharge  CV: NEGATIVE for chest pain, palpitations or peripheral edema  GI: NEGATIVE for nausea, abdominal pain, heartburn, or change in bowel habits  : NEGATIVE for frequency, dysuria, or hematuria  MUSCULOSKELETAL: NEGATIVE for significant arthralgias or myalgia  NEURO: NEGATIVE for weakness, dizziness or paresthesias  ENDOCRINE: NEGATIVE for temperature intolerance, skin/hair changes  HEME: NEGATIVE for bleeding problems  PSYCHIATRIC: NEGATIVE for changes in mood or affect    OBJECTIVE:   /80  Pulse 93  Temp 97.8  F (36.6  C) (Oral)  Resp 16  Ht 5' 3.47\" (1.612 m)  Wt 143 lb 12.8 oz (65.2 kg)  SpO2 98%  BMI 25.1 kg/m2 Estimated body mass index is 25.1 kg/(m^2) as calculated from the following:    Height as of this encounter: 5' 3.47\" (1.612 m).    Weight as of this encounter: 143 lb 12.8 oz (65.2 kg).  Physical Exam  GENERAL APPEARANCE: healthy, alert and no distress  EYES: Eyes grossly normal to inspection, PERRL and conjunctivae and sclerae normal  HENT: ear canals and TM's normal, nose and mouth without ulcers or lesions, oropharynx clear and oral mucous membranes moist  NECK: no adenopathy, no asymmetry, masses, or scars and thyroid normal to palpation  RESP: lungs clear to auscultation - no rales, rhonchi or wheezes  BREAST: normal without masses, tenderness or nipple discharge and no palpable axillary masses or adenopathy  CV: regular " rate and rhythm, normal S1 S2, no S3 or S4, no murmur, click or rub, no peripheral edema and peripheral pulses strong  ABDOMEN: soft, nontender, no hepatosplenomegaly, no masses and bowel sounds normal  MS: no musculoskeletal defects are noted and gait is age appropriate without ataxia  Is slightly tender over right sacroiliac joint. Gait is brisk and normal   SKIN: no suspicious lesions or rashes  NEURO: Normal strength and tone, sensory exam grossly normal, mentation intact and speech normal  PSYCH: mentation appears normal and affect normal/bright    Diagnostic Test Results:  none     ASSESSMENT / PLAN:   1. Encounter for routine adult health examination without abnormal findings  Healthy lady     2. Personal history of venous thrombosis and embolism  As above   - warfarin (COUMADIN) 5 MG tablet; Take 1 tab M,W,F. Take 1/2 tablet the rest of the week or as directed  Dispense: 90 tablet; Refill: 3    3. Primary hypercoagulable state (H)  Good control   - warfarin (COUMADIN) 5 MG tablet; Take 1 tab M,W,F. Take 1/2 tablet the rest of the week or as directed  Dispense: 90 tablet; Refill: 3    4. Right-sided low back pain with right-sided sciatica, unspecified chronicity  Physical therapy will help   - [facility] REFERRAL; Future    5. Crohn's disease of both small and large intestine with other complication (H)  Good control     6. Visit for screening mammogram  due  - MA SCREENING DIGITAL BILAT - Future  (s+30); Future    7. Asymptomatic postmenopausal status  due  - DX Hip/Pelvis/Spine; Future        End of Life Planning:  Patient currently has an advanced directive: No.  I have verified the patient's ablity to prepare an advanced directive/make health care decisions.  Literature was provided to assist patient in preparing an advanced directive.    COUNSELING:  Reviewed preventive health counseling, as reflected in patient instructions       Regular exercise       Healthy diet/nutrition    BP  "Readings from Last 1 Encounters:   10/22/18 130/80     Estimated body mass index is 25.1 kg/(m^2) as calculated from the following:    Height as of this encounter: 5' 3.47\" (1.612 m).    Weight as of this encounter: 143 lb 12.8 oz (65.2 kg).           reports that she quit smoking about 10 years ago. Her smoking use included Cigarettes. She has a 19.00 pack-year smoking history. She has never used smokeless tobacco.      Appropriate preventive services were discussed with this patient, including applicable screening as appropriate for cardiovascular disease, diabetes, osteopenia/osteoporosis, and glaucoma.  As appropriate for age/gender, discussed screening for colorectal cancer, prostate cancer, breast cancer, and cervical cancer. Checklist reviewing preventive services available has been given to the patient.    Reviewed patients plan of care and provided an AVS. The Basic Care Plan (routine screening as documented in Health Maintenance) for Kathya meets the Care Plan requirement. This Care Plan has been established and reviewed with the Patient.    Counseling Resources:  ATP IV Guidelines  Pooled Cohorts Equation Calculator  Breast Cancer Risk Calculator  FRAX Risk Assessment  ICSI Preventive Guidelines  Dietary Guidelines for Americans, 2010  USDA's MyPlate  ASA Prophylaxis  Lung CA Screening    ZENOBIA MULLEN MD  [facility]  "

## 2018-10-29 ENCOUNTER — THERAPY VISIT (OUTPATIENT)
Dept: PHYSICAL THERAPY | Facility: CLINIC | Age: 73
End: 2018-10-29
Attending: FAMILY MEDICINE
Payer: COMMERCIAL

## 2018-10-29 DIAGNOSIS — M54.41 RIGHT-SIDED LOW BACK PAIN WITH RIGHT-SIDED SCIATICA, UNSPECIFIED CHRONICITY: ICD-10-CM

## 2018-10-29 PROCEDURE — G8981 BODY POS CURRENT STATUS: HCPCS | Mod: GP | Performed by: PHYSICAL THERAPIST

## 2018-10-29 PROCEDURE — G8982 BODY POS GOAL STATUS: HCPCS | Mod: GP | Performed by: PHYSICAL THERAPIST

## 2018-10-29 PROCEDURE — 97110 THERAPEUTIC EXERCISES: CPT | Mod: GP | Performed by: PHYSICAL THERAPIST

## 2018-10-29 PROCEDURE — 97530 THERAPEUTIC ACTIVITIES: CPT | Mod: GP | Performed by: PHYSICAL THERAPIST

## 2018-10-29 PROCEDURE — 97161 PT EVAL LOW COMPLEX 20 MIN: CPT | Mod: GP | Performed by: PHYSICAL THERAPIST

## 2018-10-29 NOTE — PROGRESS NOTES
[place] [facility] Initial Evaluation  Subjective:  Patient is a 72 year old female presenting with rehab back hpi.   Kathya Breaux is a 72 year old female with a lumbar condition.  Condition occurred with:  Other reason.  Condition occurred: for unknown reasons.  This is a chronic condition  Patient reports onset of low back pain in April 2018 without a specific mechanism of injury or change in daily routine. .    Patient reports pain:  Lumbar spine right.  Radiates to:  Gluteals right.  Pain is described as aching and is intermittent and reported as 5/10.  Associated symptoms:  Loss of motion/stiffness. Pain is worse during the day.  Symptoms are exacerbated by sitting and bending and relieved by activity/movement (walking).  Since onset symptoms are unchanged.  Special testing: none.      General health as reported by patient is good.                      Red flags:  None as reported by the patient.                        Objective:  Standing Alignment:    Cervical/Thoracic:  Forward head  Shoulder/UE:  Rounded shoulders              Gait:    Gait Type:  Normal   Assistive Devices:  None                 Lumbar/SI Evaluation  ROM:    AROM Lumbar:   Flexion:            Hands to floor   Ext:                    WNL, mild pain on right side   Side Bend:        Left:  Hand to knee    Right:  Hand to knee  Rotation:           Left:  WNL    Right:  WNL  Side Glide:        Left:     Right:           Lumbar Myotomes:  Lumbar myotomes: grossly 4+/5 bilaterally.                Lumbar Dermtomes:  normal                Neural Tension/Mobility:  Lumbar:  Normal        Lumbar Palpation:      Tenderness present at Right: PSIS and Vertebral    Lumbar Provocation:  normal      Spinal Segmental Conclusions:     Level: Hypo noted at L5, L4 and S1      SI joint/Sacrum:    unremarkable                                                       General     ROS    Assessment/Plan:    Patient is a 72 year old female with  lumbar complaints.    Patient has the following significant findings with corresponding treatment plan.                Diagnosis 1:  Low Back  Pain -  hot/cold therapy, self management, education, directional preference exercise and home program  Decreased ROM/flexibility - manual therapy, therapeutic exercise, therapeutic activity and home program  Decreased joint mobility - manual therapy, therapeutic exercise, therapeutic activity and home program  Decreased strength - therapeutic exercise, therapeutic activities and home program  Decreased function - therapeutic activities and home program  Impaired posture - neuro re-education, therapeutic activities and home program    Therapy Evaluation Codes:   1) History comprised of:   Personal factors that impact the plan of care:      None.    Comorbidity factors that impact the plan of care are:      None.     Medications impacting care: None.  2) Examination of Body Systems comprised of:   Body structures and functions that impact the plan of care:      Lumbar spine.   Activity limitations that impact the plan of care are:      Bending and Sitting.  3) Clinical presentation characteristics are:   Stable/Uncomplicated.  4) Decision-Making    Low complexity using standardized patient assessment instrument and/or measureable assessment of functional outcome.  Cumulative Therapy Evaluation is: Low complexity.    Previous and current functional limitations:  (See Goal Flow Sheet for this information)    Short term and Long term goals: (See Goal Flow Sheet for this information)     Communication ability:  Patient appears to be able to clearly communicate and understand verbal and written communication and follow directions correctly.  Treatment Explanation - The following has been discussed with the patient:   RX ordered/plan of care  Anticipated outcomes  Possible risks and side effects  This patient would benefit from PT intervention to resume normal activities.   Rehab  potential is good.    Frequency:  1 X week, once daily  Duration:  for 6 weeks  Discharge Plan:  Achieve all LTG.  Independent in home treatment program.  Reach maximal therapeutic benefit.    Please refer to the daily flowsheet for treatment today, total treatment time and time spent performing 1:1 timed codes.

## 2018-10-29 NOTE — PROGRESS NOTES
[place] [facility] Initial Evaluation  Subjective:  Patient is a 72 year old female presenting with rehab left ankle/foot hpi.                                      Pertinent medical history includes:  Anemia, osteoarthritis, smoking and other.  Medical allergies: yes.  Other surgeries include:  Other.  Current medications:  Heparin/coumadin and other.  Current occupation is retired.                                    Objective:  System    Physical Exam    General     ROS    Assessment/Plan:

## 2018-10-29 NOTE — PROGRESS NOTES
[place] [facility] Initial Evaluation  Subjective:  Patient is a 72 year old female presenting with rehab left ankle/foot hpi.                       Objective:  System    Physical Exam    General     ROS    Assessment/Plan:

## 2018-11-02 ENCOUNTER — RADIANT APPOINTMENT (OUTPATIENT)
Dept: MAMMOGRAPHY | Facility: CLINIC | Age: 73
End: 2018-11-02
Attending: FAMILY MEDICINE
Payer: COMMERCIAL

## 2018-11-02 ENCOUNTER — RADIANT APPOINTMENT (OUTPATIENT)
Dept: BONE DENSITY | Facility: CLINIC | Age: 73
End: 2018-11-02
Attending: FAMILY MEDICINE
Payer: COMMERCIAL

## 2018-11-02 DIAGNOSIS — Z12.31 VISIT FOR SCREENING MAMMOGRAM: ICD-10-CM

## 2018-11-02 DIAGNOSIS — Z78.0 ASYMPTOMATIC POSTMENOPAUSAL STATUS: ICD-10-CM

## 2018-11-02 PROCEDURE — 77067 SCR MAMMO BI INCL CAD: CPT | Mod: TC

## 2018-11-02 PROCEDURE — 77080 DXA BONE DENSITY AXIAL: CPT | Performed by: INTERNAL MEDICINE

## 2018-11-02 NOTE — PROGRESS NOTES
Please call if patient does not view results.    Jl Rasheed,   Your DEXA scan shows you do have Osteoporosis, or thinning of the bones. Please follow up with Dr. Daugherty in clinic to discuss treatment options.    Phyllis Espinosa, CNP

## 2018-11-06 ENCOUNTER — THERAPY VISIT (OUTPATIENT)
Dept: PHYSICAL THERAPY | Facility: CLINIC | Age: 73
End: 2018-11-06
Payer: COMMERCIAL

## 2018-11-06 DIAGNOSIS — M54.41 RIGHT-SIDED LOW BACK PAIN WITH RIGHT-SIDED SCIATICA, UNSPECIFIED CHRONICITY: ICD-10-CM

## 2018-11-06 PROCEDURE — G8982 BODY POS GOAL STATUS: HCPCS | Mod: GP | Performed by: PHYSICAL THERAPIST

## 2018-11-06 PROCEDURE — 97110 THERAPEUTIC EXERCISES: CPT | Mod: GP | Performed by: PHYSICAL THERAPIST

## 2018-11-06 PROCEDURE — G8981 BODY POS CURRENT STATUS: HCPCS | Mod: GP | Performed by: PHYSICAL THERAPIST

## 2018-11-06 NOTE — PROGRESS NOTES
Subjective:  HPI  Oswestry Score: 2 %                 Objective:  System    Physical Exam    General     ROS    Assessment/Plan:    DISCHARGE REPORT    Progress reporting period is from 10/29/2018 to 11/6/2018.       SUBJECTIVE  Subjective changes noted by patient:   Subjective: Patient reports feeling better since last PT session. Patient reports feeling tight in the morning and that stiffness improves with movement. patient reports little to no discomfot    Current pain level is  Current Pain level: 1/10.     Previous pain level was   Initial Pain level: 5/10.   Changes in function:  Yes (See Goal flowsheet attached for changes in current functional level)  Adverse reaction to treatment or activity: None    OBJECTIVE  Changes noted in objective findings:  Yes,   Objective: unremarkable lumbar spine exam     ASSESSMENT/PLAN  Updated problem list and treatment plan: Diagnosis 1:  Low back pain    STG/LTGs have been met or progress has been made towards goals:  Yes (See Goal flow sheet completed today.)  Assessment of Progress: The patient's condition is improving.  The patient has met all of their long term goals.  Self Management Plans:  Patient is independent in a home treatment program.  Patient is independent in self management of symptoms.  I have re-evaluated this patient and find that the nature, scope, duration and intensity of the therapy is appropriate for the medical condition of the patient.  Kathya continues to require the following intervention to meet STG and LTG's:  PT intervention is no longer required to meet STG/LTG.    Recommendations:  This patient is ready to be discharged from therapy and continue their home treatment program.    Please refer to the daily flowsheet for treatment today, total treatment time and time spent performing 1:1 timed codes.

## 2018-11-14 NOTE — PROGRESS NOTES
"  SUBJECTIVE:   Kathya Breaux is a 72 year old female who presents to clinic today for the following health issues:      Patient presents with:  Results: DEXA SCAN RESULTS             Problem list and histories reviewed & adjusted, as indicated.  Additional history: as documented    Patient Active Problem List   Diagnosis     Personal history of DVT (deep vein thrombosis)     Factor V Leiden Heterozygote     CARDIOVASCULAR SCREENING; LDL GOAL LESS THAN 130     Long-term (current) use of anticoagulants [Z79.01]     Posterior vitreous detachment, bilateral     Hx of LASIK     Crohn's disease of both small and large intestine with other complication (H)     Age-related osteoporosis without current pathological fracture     Past Surgical History:   Procedure Laterality Date     ABDOMEN SURGERY  3 events    Bowel resec. in 1964, bowel resec in 1969     APPENDECTOMY      first bowel surgery     C NONSPECIFIC PROCEDURE  1964    3 1/2\" sm. intestine removed-illeitis     C NONSPECIFIC PROCEDURE  1969    4\" sm. intestine removed-illeitis     C NONSPECIFIC PROCEDURE  1980    Hysterectomy     COLONOSCOPY  5/2016    Q 5 years      ENHANCE LASER REFRACTIVE BILATERAL EXISTING PT IN PARAM[facility]ERS       EYE SURGERY  2001    lasik surgery     GYN SURGERY  1980    hysterectomy       Social History   Substance Use Topics     Smoking status: Former Smoker     Packs/day: 0.50     Years: 38.00     Types: Cigarettes     Quit date: 3/1/2008     Smokeless tobacco: Never Used      Comment: 6 cigs per day     Alcohol use No     Family History   Problem Relation Age of Onset     HEART DISEASE Father      dysrythmia's     Glaucoma Father      HEART DISEASE Son      clogged arteries     Cardiovascular Son      sudden heart attack     Circulatory Mother      phelbities     Genetic Disorder Mother      factor V leiden     Unknown/Adopted Maternal Grandmother      Gynecology Maternal Grandfather      Cerebrovascular Disease Paternal Grandmother  "     Cerebrovascular Disease Paternal Grandfather      Alcohol/Drug Brother      alcohol and drugs     Alcohol/Drug Sister      alcohol and drugs     Breast Cancer Cousin      Colon Cancer Other      Anxiety Disorder Sister      Substance Abuse Sister      Anxiety Disorder Brother      Substance Abuse Brother          Current Outpatient Prescriptions   Medication Sig Dispense Refill     alendronate (FOSAMAX) 70 MG tablet Take 1 tablet (70 mg) by mouth with 8oz water every 7 days 30 minutes before breakfast and remain upright during this time. 12 tablet 3     CALCIUM 500 + D 500-200 MG-IU OR TABS 1 TABLET DAILY  0     IMURAN 50 MG OR TABS 1 TABLET DAILY       MULTI-VITAMIN OR TABS 1 TABLET DAILY  0     OMEGA 3-6-9 FATTY ACIDS OR 1 TABLET DAILY       OMEPRAZOLE 20 MG OR CPDR 1 CAPSULE DAILY       VITAMIN B-12 1000 MCG/ML IJ SOLN 1000 MCG Monthly  0     VITAMIN-B COMPLEX OR TABS 1 tablet daily       warfarin (COUMADIN) 5 MG tablet Take 1 tab M,W,F. Take 1/2 tablet the rest of the week or as directed 90 tablet 3     Allergies   Allergen Reactions     Penicillins Hives     Throat closed     BP Readings from Last 3 Encounters:   11/19/18 144/76   10/22/18 130/80   10/15/18 121/68    Wt Readings from Last 3 Encounters:   11/19/18 147 lb 12.8 oz (67 kg)   10/22/18 143 lb 12.8 oz (65.2 kg)   10/15/18 144 lb (65.3 kg)                  Labs reviewed in EPIC    Reviewed and updated as needed this visit by clinical staff       Reviewed and updated as needed this visit by Provider         ROS:  This 72 year old female is here today to discuss her recent dexascan report. She hadn't had one in 11 years and her bone density of her hips went from -1.5 to -2.5. She has Crohn's disease so she knows she is at risk due to malabsorption possibilities. She stays active. Walks up and down her steps at home many times a day. Eats dairy products and gets sun exposure in the summer. Is willing to be on alendronate to prevent fractures as she is  "a slim lady. She is a former smoker also. All other review of systems are negative  Personal, family, and social history reviewed with patient and revised.         OBJECTIVE:     /76  Pulse 77  Temp 97.2  F (36.2  C) (Oral)  Resp 20  Ht 5' 3.47\" (1.612 m)  Wt 147 lb 12.8 oz (67 kg)  SpO2 98%  BMI 25.8 kg/m2  Body mass index is 25.8 kg/(m^2).  GENERAL: healthy, alert and no distress  NECK: no adenopathy, no asymmetry, masses, or scars and thyroid normal to palpation  RESP: lungs clear to auscultation - no rales, rhonchi or wheezes  CV: regular rate and rhythm, normal S1 S2, no S3 or S4, no murmur, click or rub, no peripheral edema and peripheral pulses strong  MS: no gross musculoskeletal defects noted, no edema  Slim lady  Brisk gait   Well hydrated  Well nourished  Well groomed  Alert and oriented X 3  Good spirits      Diagnostic Test Results:  none     ASSESSMENT/PLAN:              1. Age-related osteoporosis without current pathological fracture  As above   - alendronate (FOSAMAX) 70 MG tablet; Take 1 tablet (70 mg) by mouth with 8oz water every 7 days 30 minutes before breakfast and remain upright during this time.  Dispense: 12 tablet; Refill: 3    Return to clinic 1 year     ZENOBIA MULLEN MD  [facility]  "

## 2018-11-19 ENCOUNTER — OFFICE VISIT (OUTPATIENT)
Dept: FAMILY MEDICINE | Facility: CLINIC | Age: 73
End: 2018-11-19
Payer: COMMERCIAL

## 2018-11-19 VITALS
WEIGHT: 147.8 LBS | HEART RATE: 77 BPM | RESPIRATION RATE: 20 BRPM | OXYGEN SATURATION: 98 % | SYSTOLIC BLOOD PRESSURE: 144 MMHG | HEIGHT: 63 IN | DIASTOLIC BLOOD PRESSURE: 76 MMHG | BODY MASS INDEX: 26.19 KG/M2 | TEMPERATURE: 97.2 F

## 2018-11-19 DIAGNOSIS — M81.0 AGE-RELATED OSTEOPOROSIS WITHOUT CURRENT PATHOLOGICAL FRACTURE: Primary | ICD-10-CM

## 2018-11-19 PROCEDURE — 99213 OFFICE O/P EST LOW 20 MIN: CPT | Performed by: FAMILY MEDICINE

## 2018-11-19 RX ORDER — ALENDRONATE SODIUM 70 MG/1
TABLET ORAL
Qty: 12 TABLET | Refills: 3 | Status: SHIPPED | OUTPATIENT
Start: 2018-11-19 | End: 2020-02-21

## 2018-11-19 NOTE — MR AVS SNAPSHOT
After Visit Summary   11/19/2018    Kathya Breaux    MRN: [id number]           Patient Information     Date Of Birth          1945        Visit Information        Provider Department      11/19/2018 2:00 PM Azalia Daugherty MD [facility]        Today's Diagnoses     Age-related osteoporosis without current pathological fracture    -  1      Care Instructions    [facility]    If you have any questions regarding to your visit please contact your care team:       Team Purple:   Clinic Hours Telephone Number   Dr. Azalia Grossman   7am-7pm  Monday - Thursday   7am-5pm  Fridays  [phone]  (Appointment scheduling available 24/7)   Urgent Care - [place] and [facility] - 11am-9pm Monday-Friday Saturday-Sunday- 9am-5pm   [place] - 5pm-9pm Monday-Friday Saturday-Sunday- 9am-5pm  [phone] - [place]  [phone] - [place]       What options do I have for a visit other than an office visit? We offer electronic visits (e-visits) and telephone visits, when medically appropriate.  Please check with your medical insurance to see if these types of visits are covered, as you will be responsible for any charges that are not paid by your insurance.      You can use [employer] (secure electronic communication) to access to your chart, send your primary care provider a message, or make an appointment. Ask a team member how to get started.     For a price quote for your services, please call our Consumer Price Line at [phone] or our Imaging Cost estimation line at [phone] (for imaging tests).    [facility]    If you have any questions regarding to your visit please contact your care team:       Team Purple:   Clinic Hours Telephone Number   Dr. Azalia Grossman   7am-7pm  Monday - Thursday   7am-5pm  Fridays  [phone]  (Appointment scheduling  available 24/7)   Urgent Care - [place] - 11am-9pm Monday-Friday Saturday-Sunday- 9am-5pm   [place] - 5pm-9pm Monday-Friday Saturday-Sunday- 9am-5pm  [phone] - [place]  [phone] - [place]       What options do I have for a visit other than an office visit? We offer electronic visits (e-visits) and telephone visits, when medically appropriate.  Please check with your medical insurance to see if these types of visits are covered, as you will be responsible for any charges that are not paid by your insurance.      You can use [employer] (secure electronic communication) to access to your chart, send your primary care provider a message, or make an appointment. Ask a team member how to get started.     For a price quote for your services, please call our Consumer Price Line at [phone] or our Imaging Cost estimation line at [phone] (for imaging tests).                  Follow-ups after your visit        Your next 10 appointments already scheduled     Nov 23, 2018  9:15 AM CST   Anticoagulation Visit with [facility]   [facility]    [address] [facility] [place] [place] [address] [phone]              Who to contact     If you have questions or need follow up information about today's clinic visit or your schedule please contact [facility] directly at [phone].  Normal or non-critical lab and imaging results will be communicated to you by Soapetshart, letter or phone within 4 business days after the clinic has received the results. If you do not hear from us within 7 days, please contact the clinic through Puzzliumt or phone. If you have a critical or abnormal lab result, we will notify you by phone as soon as possible.  Submit refill requests through P[employer]Book Data or call your pharmacy and they will forward the refill request to us. Please allow 3 business days for your refill to be completed.          Additional  "Information About Your Visit        MyChart Information     [employer] gives you secure access to your electronic health record. If you see a primary care provider, you can also send messages to your care team and make appointments. If you have questions, please call your primary care clinic.  If you do not have a primary care provider, please call [phone] and they will assist you.        Care EveryWhere ID     This is your Care EveryWhere ID. This could be used by other organizations to access your [place] medical records  [id number]        Your Vitals Were     Pulse Temperature Respirations Height Pulse Oximetry BMI (Body Mass Index)    77 97.2  F (36.2  C) (Oral) 20 5' 3.47\" (1.612 m) 98% 25.8 kg/m2       Blood Pressure from Last 3 Encounters:   11/19/18 144/76   10/22/18 130/80   10/15/18 121/68    Weight from Last 3 Encounters:   11/19/18 147 lb 12.8 oz (67 kg)   10/22/18 143 lb 12.8 oz (65.2 kg)   10/15/18 144 lb (65.3 kg)              Today, you had the following     No orders found for display         Today's Medication Changes          These changes are accurate as of 11/19/18  2:18 PM.  If you have any questions, ask your nurse or doctor.               Start taking these medicines.        Dose/Directions    alendronate 70 MG tablet   Commonly known as:  FOSAMAX   Used for:  Age-related osteoporosis without current pathological fracture   Started by:  Azalia Daugherty MD        Take 1 tablet (70 mg) by mouth with 8oz water every 7 days 30 minutes before breakfast and remain upright during this time.   Quantity:  12 tablet   Refills:  3            Where to get your medicines      These medications were sent to [facility] - [place], [place] - [address]  [address] [place] [address]     Phone:  [phone]     alendronate 70 MG tablet                Primary Care Provider Office Phone # Fax #    Azalia Daugherty [phone] [facility] [address]        Equal Access to Services     BILLPHI YAMILEX : Hadii aad ku hadluis alfredovelma Pantoja, wafanda linda, leslieta troygradysparkle ruggiero, nika morrowstevensonwhitney james. So [facility] [phone].    ATENCIÓN: Si habla español, tiene a parrish disposición servicios gratuitos de asistencia lingüística. [facility] [phone].    We comply with applicable federal civil rights laws and [place] laws. We do not discriminate on the basis of race, color, national origin, age, disability, sex, sexual orientation, or gender identity.            Thank you!     Thank you for choosing [facility]  for your care. Our goal is always to provide you with excellent care. Hearing back from our patients is one way we can continue to improve our services. Please take a few minutes to complete the written survey that you may receive in the mail after your visit with us. Thank you!             Your Updated Medication List - Protect others around you: Learn how to safely use, store and throw away your medicines at www.disposemymeds.org.          This list is accurate as of 11/19/18  2:18 PM.  Always use your most recent med list.                   Brand Name Dispense Instructions for use Diagnosis    alendronate 70 MG tablet    FOSAMAX    12 tablet    Take 1 tablet (70 mg) by mouth with 8oz water every 7 days 30 minutes before breakfast and remain upright during this time.    Age-related osteoporosis without current pathological fracture       CALCIUM 500 + D 500-200 MG-IU Tabs      1 TABLET DAILY        IMURAN 50 MG tablet   Generic drug:  azaTHIOprine      1 TABLET DAILY    Routine gynecological examination       Multi-vitamin Tabs tablet   Generic drug:  multivitamin, therapeutic with minerals      1 TABLET DAILY        OMEGA 3-6-9 FATTY ACIDS PO      1 TABLET DAILY        omeprazole 20 MG CR capsule    priLOSEC     1 CAPSULE DAILY    Routine gynecological examination       Vitamin B-12  1000 MCG/ML Soln      1000 MCG Monthly    Routine gynecological examination       Vitamin-B Complex Tabs      1 tablet daily        warfarin 5 MG tablet    COUMADIN    90 tablet    Take 1 tab M,W,F. Take 1/2 tablet the rest of the week or as directed    Personal history of venous thrombosis and embolism, Primary hypercoagulable state (H)

## 2018-11-19 NOTE — PATIENT INSTRUCTIONS
[facility]    If you have any questions regarding to your visit please contact your care team:       Team Purple:   Clinic Hours Telephone Number   Dr. Azalia Grossman   7am-7pm  Monday - Thursday 7am-5pm  Fridays  [phone]  (Appointment scheduling available 24/7)   Urgent Care - [place] - 11am-9pm Monday-Friday Saturday-Sunday- 9am-5pm   [place] - 5pm-9pm Monday-Friday Saturday-Sunday- 9am-5pm  [phone] - [place]  [phone] - [place]       What options do I have for a visit other than an office visit? We offer electronic visits (e-visits) and telephone visits, when medically appropriate.  Please check with your medical insurance to see if these types of visits are covered, as you will be responsible for any charges that are not paid by your insurance.      You can use ShopItToMe (secure electronic communication) to access to your chart, send your primary care provider a message, or make an appointment. Ask a team member how to get started.     For a price quote for your services, please call our Consumer Price Line at [phone] or our Imaging Cost estimation line at [phone] (for imaging tests).    [facility]    If you have any questions regarding to your visit please contact your care team:       Team Purple:   Clinic Hours Telephone Number   Dr. Azalia Grossman   7am-7pm  Monday - Thursday   7am-5pm  Fridays  [phone]  (Appointment scheduling available 24/7)   Urgent Care - [place] - 11am-9pm Monday-Friday Saturday-Sunday- 9am-5pm   [place] - 5pm-9pm Monday-Friday Saturday-Sunday- 9am-5pm  [phone] - [place]  [phone] - [place]       What options do I have for a visit other than an office visit? We offer electronic visits (e-visits) and telephone visits, when medically appropriate.  Please  check with your medical insurance to see if these types of visits are covered, as you will be responsible for any charges that are not paid by your insurance.      You can use Open Me (secure electronic communication) to access to your chart, send your primary care provider a message, or make an appointment. Ask a team member how to get started.     For a price quote for your services, please call our Consumer Price Line at [phone] or our Imaging Cost estimation line at [phone] (for imaging tests).

## 2018-11-23 ENCOUNTER — ANTICOAGULATION THERAPY VISIT (OUTPATIENT)
Dept: NURSING | Facility: CLINIC | Age: 73
End: 2018-11-23
Payer: COMMERCIAL

## 2018-11-23 DIAGNOSIS — D68.51 FACTOR V LEIDEN MUTATION (H): ICD-10-CM

## 2018-11-23 DIAGNOSIS — Z86.718 PERSONAL HISTORY OF DVT (DEEP VEIN THROMBOSIS): ICD-10-CM

## 2018-11-23 LAB — INR POINT OF CARE: 2 (ref 0.86–1.14)

## 2018-11-23 PROCEDURE — 85610 PROTHROMBIN TIME: CPT | Mod: QW

## 2018-11-23 PROCEDURE — 99207 ZZC NO CHARGE NURSE ONLY: CPT

## 2018-11-23 PROCEDURE — 36416 COLLJ CAPILLARY BLOOD SPEC: CPT

## 2018-11-23 NOTE — TELEPHONE ENCOUNTER
RECORDS STATUS - ALL OTHER DIAGNOSIS      RECORDS RECEIVED FROM: Complete   DATE RECEIVED: 11/23/18   NOTES STATUS DETAILS   OFFICE NOTE from referring provider Complete In EPIC   OFFICE NOTE from medical oncologist     DISCHARGE SUMMARY from hospital     DISCHARGE REPORT from the ER     OPERATIVE REPORT     MEDICATION LIST     CLINICAL TRIAL TREATMENTS TO DATE     LABS     PATHOLOGY REPORTS     ANYTHING RELATED TO DIAGNOSIS     GENONOMIC TESTING     TYPE:     IMAGING (NEED IMAGES & REPORT)     CT SCANS Complete PACS   MRI     MAMMO     ULTRASOUND     PET

## 2018-11-23 NOTE — PROGRESS NOTES
ANTICOAGULATION FOLLOW-UP CLINIC VISIT    Patient Name:  Kathya Breaux  Date:  11/23/2018  Contact Type:  Face to Face    SUBJECTIVE:     Patient Findings     Positives No Problem Findings    Comments Bleeding Signs/Symptoms: NO  Thromboembolic Signs/Symptoms: NO     Medication Changes:  Patient is starting Fosamax and Vitamin D  Dietary Changes:  NO  Bacterial/Viral Infection: NO     Missed Coumadin Doses: NO  Other Concerns: Informed patient of this message:  I would like to see this patient back since I have not seen her in over 7 years for long term anticoagulation recommendations which have now changed and we favor newer anticoagulants in the long term especially if affordable to the patient over warfarin. I am happy to see her back and discuss.    Thank you, Dr. Scherer      Phone number provided to patient to schedule an appointment             OBJECTIVE    INR Protime   Date Value Ref Range Status   11/23/2018 2.0 (A) 0.86 - 1.14 Final       ASSESSMENT / PLAN  INR assessment THER    Recheck INR In: 6 WEEKS    INR Location Clinic      Anticoagulation Summary as of 11/23/2018     INR goal 1.5-2.0   Today's INR 2.0   Warfarin maintenance plan 5 mg (5 mg x 1) on Mon, Fri; 2.5 mg (5 mg x 0.5) all other days   Full warfarin instructions 5 mg on Mon, Fri; 2.5 mg all other days   Weekly warfarin total 22.5 mg   No change documented Emilie José RN   Plan last modified Emilie José, RN (12/15/2017)   Next INR check 1/4/2019   Priority INR   Target end date Indefinite    Indications   Long-term (current) use of anticoagulants [Z79.01] [Z79.01]  Personal history of DVT (deep vein thrombosis) [Z86.718]  Factor V Leiden Heterozygote [D68.51]         Anticoagulation Episode Summary     INR check location     Preferred lab     Send INR reminders to [facility]    Comments       Anticoagulation Care Providers     Provider Role Specialty Phone number    Azalia Daugherty MD Responsible Family Practice  [phone]            See the Encounter Report to view Anticoagulation Flowsheet and Dosing Calendar (Go to Encounters tab in chart review, and find the Anticoagulation Therapy Visit)    Dosage adjustment made based on physician directed care plan.    Emilie José RN

## 2018-11-23 NOTE — MR AVS SNAPSHOT
Kathya Farrellffens   11/23/2018 9:15 AM   Anticoagulation Therapy Visit    Description:  72 year old female   Provider:  [facility]   Department:  [facility]           INR as of 11/23/2018     Today's INR 2.0      Anticoagulation Summary as of 11/23/2018     INR goal 1.5-2.0   Today's INR 2.0   Full warfarin instructions 5 mg on Mon, Fri; 2.5 mg all other days   Next INR check 1/4/2019    Indications   Long-term (current) use of anticoagulants [Z79.01] [Z79.01]  Personal history of DVT (deep vein thrombosis) [Z86.718]  Factor V Leiden Heterozygote [D68.51]         Your next Anticoagulation Clinic appointment(s)     Jan 04, 2019  9:15 AM CST   Anticoagulation Visit with [facility] ANTI [facility]   [facility] ([address] [address] [phone]              Contact Numbers     [facility]  Please call [phone] to cancel and/or reschedule your appointment   Please call [phone] with any problems or questions regarding your therapy.        November 2018 Details    Sun Mon Tue Wed Thu Fri Sat         1               2               3                 4               5               6               7               8               9               10                 11               12               13               14               15               16               17                 18               19               20               21               22               23      5 mg   See details      24      2.5 mg           25      2.5 mg         26      5 mg         27      2.5 mg         28      2.5 mg         29      2.5 mg         30      5 mg           Date Details   11/23 This INR check               How to take your warfarin dose     To take:  2.5 mg Take 0.5 of a 5 mg tablet.    To take:  5 mg Take 1 of the 5 mg tablets.           December 2018 Details    Sun Mon Tue Wed Thu Fri Sat           1      2.5 mg           2      2.5 mg         3      5  mg         4      2.5 mg         5      2.5 mg         6      2.5 mg         7      5 mg         8      2.5 mg           9      2.5 mg         10      5 mg         11      2.5 mg         12      2.5 mg         13      2.5 mg         14      5 mg         15      2.5 mg           16      2.5 mg         17      5 mg         18      2.5 mg         19      2.5 mg         20      2.5 mg         21      5 mg         22      2.5 mg           23      2.5 mg         24      5 mg         25      2.5 mg         26      2.5 mg         27      2.5 mg         28      5 mg         29      2.5 mg           30      2.5 mg         31      5 mg               Date Details   No additional details            How to take your warfarin dose     To take:  2.5 mg Take 0.5 of a 5 mg tablet.    To take:  5 mg Take 1 of the 5 mg tablets.           January 2019 Details    Sun Mon Tue Wed Thu Fri Sat       1      2.5 mg         2      2.5 mg         3      2.5 mg         4            5                 6               7               8               9               10               11               12                 13               14               15               16               17               18               19                 20               21               22               23               24               25               26                 27               28               29               30               31                  Date Details   No additional details    Date of next INR:  1/4/2019         How to take your warfarin dose     To take:  2.5 mg Take 0.5 of a 5 mg tablet.    To take:  5 mg Take 1 of the 5 mg tablets.

## 2018-11-27 ENCOUNTER — ONCOLOGY VISIT (OUTPATIENT)
Dept: ONCOLOGY | Facility: CLINIC | Age: 73
End: 2018-11-27
Payer: COMMERCIAL

## 2018-11-27 ENCOUNTER — PRE VISIT (OUTPATIENT)
Dept: ONCOLOGY | Facility: CLINIC | Age: 73
End: 2018-11-27

## 2018-11-27 VITALS
SYSTOLIC BLOOD PRESSURE: 138 MMHG | HEIGHT: 63 IN | BODY MASS INDEX: 25.61 KG/M2 | HEART RATE: 94 BPM | RESPIRATION RATE: 16 BRPM | WEIGHT: 144.56 LBS | DIASTOLIC BLOOD PRESSURE: 77 MMHG | TEMPERATURE: 98.4 F | OXYGEN SATURATION: 99 %

## 2018-11-27 DIAGNOSIS — D68.51 FACTOR V LEIDEN MUTATION (H): ICD-10-CM

## 2018-11-27 DIAGNOSIS — Z86.718 PERSONAL HISTORY OF DVT (DEEP VEIN THROMBOSIS): Primary | ICD-10-CM

## 2018-11-27 LAB
ALBUMIN SERPL-MCNC: 3.9 G/DL (ref 3.4–5)
ALP SERPL-CCNC: 74 U/L (ref 40–150)
ALT SERPL W P-5'-P-CCNC: 22 U/L (ref 0–50)
ANION GAP SERPL CALCULATED.3IONS-SCNC: 5 MMOL/L (ref 3–14)
AST SERPL W P-5'-P-CCNC: 21 U/L (ref 0–45)
BASOPHILS # BLD AUTO: 0 10E9/L (ref 0–0.2)
BASOPHILS NFR BLD AUTO: 0.3 %
BILIRUB SERPL-MCNC: 0.5 MG/DL (ref 0.2–1.3)
BUN SERPL-MCNC: 12 MG/DL (ref 7–30)
CALCIUM SERPL-MCNC: 9.4 MG/DL (ref 8.5–10.1)
CHLORIDE SERPL-SCNC: 106 MMOL/L (ref 94–109)
CO2 SERPL-SCNC: 29 MMOL/L (ref 20–32)
CREAT SERPL-MCNC: 0.71 MG/DL (ref 0.52–1.04)
DIFFERENTIAL METHOD BLD: NORMAL
EOSINOPHIL # BLD AUTO: 0.1 10E9/L (ref 0–0.7)
EOSINOPHIL NFR BLD AUTO: 0.8 %
ERYTHROCYTE [DISTWIDTH] IN BLOOD BY AUTOMATED COUNT: 13.8 % (ref 10–15)
GFR SERPL CREATININE-BSD FRML MDRD: 81 ML/MIN/1.7M2
GLUCOSE SERPL-MCNC: 91 MG/DL (ref 70–99)
HCT VFR BLD AUTO: 40.6 % (ref 35–47)
HGB BLD-MCNC: 13.4 G/DL (ref 11.7–15.7)
IMM GRANULOCYTES # BLD: 0 10E9/L (ref 0–0.4)
IMM GRANULOCYTES NFR BLD: 0.3 %
INR PPP: 2.02 (ref 0.86–1.14)
LYMPHOCYTES # BLD AUTO: 1 10E9/L (ref 0.8–5.3)
LYMPHOCYTES NFR BLD AUTO: 9.7 %
MCH RBC QN AUTO: 32.6 PG (ref 26.5–33)
MCHC RBC AUTO-ENTMCNC: 33 G/DL (ref 31.5–36.5)
MCV RBC AUTO: 99 FL (ref 78–100)
MONOCYTES # BLD AUTO: 0.4 10E9/L (ref 0–1.3)
MONOCYTES NFR BLD AUTO: 4.5 %
NEUTROPHILS # BLD AUTO: 8.2 10E9/L (ref 1.6–8.3)
NEUTROPHILS NFR BLD AUTO: 84.4 %
PLATELET # BLD AUTO: 220 10E9/L (ref 150–450)
POTASSIUM SERPL-SCNC: 3.9 MMOL/L (ref 3.4–5.3)
PROT SERPL-MCNC: 7.2 G/DL (ref 6.8–8.8)
RBC # BLD AUTO: 4.11 10E12/L (ref 3.8–5.2)
SODIUM SERPL-SCNC: 140 MMOL/L (ref 133–144)
WBC # BLD AUTO: 9.8 10E9/L (ref 4–11)

## 2018-11-27 PROCEDURE — 80053 COMPREHEN METABOLIC PANEL: CPT | Performed by: INTERNAL MEDICINE

## 2018-11-27 PROCEDURE — 85610 PROTHROMBIN TIME: CPT | Performed by: INTERNAL MEDICINE

## 2018-11-27 PROCEDURE — 85025 COMPLETE CBC W/AUTO DIFF WBC: CPT | Performed by: INTERNAL MEDICINE

## 2018-11-27 PROCEDURE — 36415 COLL VENOUS BLD VENIPUNCTURE: CPT | Performed by: INTERNAL MEDICINE

## 2018-11-27 PROCEDURE — 99204 OFFICE O/P NEW MOD 45 MIN: CPT | Performed by: INTERNAL MEDICINE

## 2018-11-27 ASSESSMENT — PAIN SCALES - GENERAL: PAINLEVEL: NO PAIN (0)

## 2018-11-27 NOTE — PROGRESS NOTES
"Hematology Consultation:  Date on this visit: 11/27/2018    Loly Breaux is referred by Marietta Ba for recommendations on long term anticoagulation.  Primary Care Physician: Azalia Calderón   GI: Mariela Cameron, [place], [place] [facility].    Diagnosis:  1. Hx of RLE DVT - She was diagnosed in 11/2009 when she presented with right lower extremity pain and swelling and a  a right lower extremity ultrasound at which demonstrated a nonocclusive thrombus in the distal right popliteal vein and an occlusive thrombus extending into the peroneal and posterior tibial veins of the proximal right calf.  The common femoral veins of the right lower extremity demonstrated normal flow. Ultimately, her f/up RLE US in 03/2011 demonstrated resolution of DVT. She has been on warfarin since 11/2009 till present time.   2. History of superficial thrombophlebitis at the age of 17- acute thrombophlebitis treated with bed rest and anticoagulants in 10/1963. 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 She was on high dose steroids as well for her ileitis.      3. Factor V Leiden Heterozygote     History Of Present Illness:  Ms. Breaux is a 73 year old female, factor V leiden heterozygote,  who presents for reevaluation of long term anticoagulation plan. She has been on warfarin since her most recent diagnosis of RLE DVT in 11/2009. She is on 5 mg twice a week and 2.5 mg on the other days of the week daily. She has been on \"low intensity\" target goal range for INR sine 2010 but current anticoagulation recommendations have changed and also newer anticoagulants are now available for long term use and she is here for current recommendations.  Her INR has been as follows:  Results for LOLY BREAUX (MRN [id number]) as of 11/27/2018 11:26   Ref. Range 1/5/2018 00:00 2/13/2018 00:00 3/13/2018 00:00 4/24/2018 00:00 6/8/2018 00:00 7/20/2018 00:00 8/31/2018 00:00 10/12/2018 " "00:00 11/23/2018 00:00   INR Latest Ref Range: 0.86 - 1.14  1.8 (A) 2.3 (A) 1.9 (A) 1.9 (A) 1.7 (A) 2.0 (A) 1.4 (A) 2.0 (A) 2.0 (A)     She has had no excessive bruising or bleeding on warfarin.    She has no swelling in her RLE. She denies CP or SOB.  She is on Imuran for Crohn's. I have reviewed outside medical records from [facility]. She has had chronically loose stools secondary to Crohn's.   Her last CBC was in 08/2018 and her Hb was 13.9 g/dl, platelets were 225 and WBC were 6.1, with ANC of 4.5 LFTs were within normal limits. Creatinine was normal in May 2018. She is pain free today and has no acute health related complaints.   In addition, a complete 12 point  review of systems is negative.         There are no exam notes on file for this visit.  Past Medical/Surgical History:  Past Medical History:   Diagnosis Date     Arthritis      CARDIOVASCULAR SCREENING; LDL GOAL LESS THAN 130 10/31/2010    age, fhx     DVT of lower extremity (deep venous thrombosis) (H) 11/09    rt. heterozygote. has seen heme-onc     Ex-cigarette smoker      Factor V Leiden Heterozygote      Factor V Leiden mutation (H)     heterozygote. 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 [facility] Mariela Cameron     Past Surgical History:   Procedure Laterality Date     ABDOMEN SURGERY  3 events    Bowel resec. in 1964, bowel resec in 1969     APPENDECTOMY      first bowel surgery     C NONSPECIFIC PROCEDURE  1964    3 1/2\" sm. intestine removed-illeitis     C NONSPECIFIC PROCEDURE  1969    4\" sm. intestine removed-illeitis     C NONSPECIFIC PROCEDURE  1980    Hysterectomy     COLONOSCOPY  5/2016    Q 5 years      ENHANCE LASER REFRACTIVE BILATERAL EXISTING PT IN PARAMETERS       EYE SURGERY  2001    lasik surgery     GYN SURGERY  1980    hysterectomy     Allergies:  Allergies as of 11/27/2018 - Haryr as Reviewed 11/19/2018   Allergen Reaction Noted     " Penicillins Hives 03/01/2005     Current Medications:  Current Outpatient Prescriptions   Medication Sig Dispense Refill     alendronate (FOSAMAX) 70 MG tablet Take 1 tablet (70 mg) by mouth with 8oz water every 7 days 30 minutes before breakfast and remain upright during this time. 12 tablet 3     CALCIUM 500 + D 500-200 MG-IU OR TABS 1 TABLET DAILY  0     IMURAN 50 MG OR TABS 1 TABLET DAILY       MULTI-VITAMIN OR TABS 1 TABLET DAILY  0     OMEGA 3-6-9 FATTY ACIDS OR 1 TABLET DAILY       OMEPRAZOLE 20 MG OR CPDR 1 CAPSULE DAILY       VITAMIN B-12 1000 MCG/ML IJ SOLN 1000 MCG Monthly  0     VITAMIN-B COMPLEX OR TABS 1 tablet daily       warfarin (COUMADIN) 5 MG tablet Take 1 tab M,W,F. Take 1/2 tablet the rest of the week or as directed 90 tablet 3      Family History:  Family History   Problem Relation Age of Onset     HEART DISEASE Father      dysrythmia's     Glaucoma Father      HEART DISEASE Son      clogged arteries     Cardiovascular Son      sudden heart attack     Circulatory Mother      phelbities     Genetic Disorder Mother      factor V leiden     Unknown/Adopted Maternal Grandmother      Gynecology Maternal Grandfather      Cerebrovascular Disease Paternal Grandmother      Cerebrovascular Disease Paternal Grandfather      Alcohol/Drug Brother      alcohol and drugs     Alcohol/Drug Sister      alcohol and drugs     Breast Cancer Cousin      Colon Cancer Other      Anxiety Disorder Sister      Substance Abuse Sister      Anxiety Disorder Brother      Substance Abuse Brother      No family history of bleeding or clotting disorder.  Social History:  Social History     Social History     Marital status:      Spouse name: N/A     Number of children: N/A     Years of education: N/A     Occupational History     Not on file.     Social History Main Topics     Smoking status: Former Smoker     Packs/day: 0.50     Years: 38.00     Types: Cigarettes     Quit date: 3/1/2008     Smokeless tobacco: Never Used  "     Comment: 6 cigs per day     Alcohol use No     Drug use: No     Sexual activity: Yes     Partners: Male     Birth control/ protection: None     Other Topics Concern     Parent/Sibling W/ Cabg, Mi Or Angioplasty Before 65f 55m? No     Social History Narrative    Caffeine intake/servings daily - 2-3    Calcium intake/servings daily - 1-2+ plus 1000mg calcium supp    Exercise 0 times weekly - describe    Sunscreen used - Yes    Seatbelts used - Yes    Guns stored in the home - No    Self Breast Exam - Yes    Pap test up to date -  Yes     Eye exam up to date -  Yes    Dental exam up to date -  Yes    DEXA scan up to date -  2005    Flex Sig/Colonoscopy up to date -  Yes 2006    Mammography up to date -  Yes 4/2006    Immunizations reviewed and up to date - Yes 1998    Abuse: Current or Past (Physical, Sexual or Emotional) - No    Do you feel safe in your environment - Yes    Do you cope well with stress - Yes    Do you suffer from insomnia - No    Last updated by: Char Strasus 3/8/2007     Physical Exam:  /77 (BP Location: Right arm)  Pulse 94  Temp 98.4  F (36.9  C) (Oral)  Resp 16  Ht 1.612 m (5' 3.47\")  Wt 65.6 kg (144 lb 9 oz)  SpO2 99%  BMI 25.23 kg/m2      GENERAL APPEARANCE: healthy, alert and no distress     HENT: Mouth without ulcers or lesions     NECK: no adenopathy, no asymmetry or masses     LYMPHATICS: No cervical, supraclavicular, axillary or inguinal lymphadenopathy     RESP: lungs clear to auscultation - no rales, rhonchi or wheezes     CARDIOVASCULAR: regular rates and rhythm, normal S1 S2, no S3 or S4 and no murmur.     ABDOMEN:  soft, nontender, no HSM or masses and bowel sounds normal     MUSCULOSKELETAL: extremities normal- no gross deformities noted, no evidence of inflammation in joints, FROM in all extremities. No edema b/l LE.     SKIN: no suspicious lesions or rashes     PSYCHIATRIC: mentation appears normal and affect normal    Laboratory/Imaging Studies  Results for orders " placed or performed in visit on 11/23/18   INR point of care   Result Value Ref Range    INR Protime 2.0 (A) 0.86 - 1.14     ASSESSMENT/PLAN:  Kathya is a pooja 73-year-old woman,  Factor V leiden heterozygote,  with a right lower extremity DVT in 11/2009, unprovoked, and a history of superficial thrombophlebitis associated and provoked by exacerbation of Crohn's ileitis.  Her RLE DVT has resolved by US in 2011.  Given the prior episode of superficial thrombophlebitis, even though the former was provoked, and history of  unprovoked episodes of right lower extremity DVT, I  would recommend indefinite anticoagulation. Crohn's disease is an additional risk factor for venous thromboembolism. She is also a factor V leiden heterozygote.  Lower intensity anticoagulation has fallen out of favor due to recent data and full intensity anticoagulation is now recommended for long term DVT prevention.  Also, newer anticoagulants are now available that have long term safety and efficacy data in treatment and prevention of DVT reoccurrence. These are convenient, fixed-dose agents that do not require routine laboratory monitoring or dose adjustments that have similar efficacy but lower bleeding risk, when compared with warfarin. The only downside as compared with warfarin is higher cost. Kathya and I have discussed switching over to Xarelto and she is interested and will check if that is an affordable option for her long term. I have given her Rx for Xarelto to start with at 15 mg PO BID x 21 days and then transition to 20 mg PO daily. Her INR was 2.0 last week and she can hold warfarin today and start Xarelto tomorrow. I would recommend that she has her CBC and kidney function monitored annually while she is on Xarelto and she has labs done routinely through her GI specialists at [facility] or with Dr. Daugherty,  She will follow-up with us on as needed basis.  At the end of our visit patient verbalized understanding and concurred  with the plan.      Addendum:  Labs from 11/27/2018 reviewed and documented in the EMR.    12/18/2018:  Our RN confirmed patient is taking Xarelto uneventfully; starting 20 mg dose daily today.  Addendum: Records from [facility] reviewed.  She had labs on 10/18/2019 which showed normal LFTs.  Hemoglobin was normal at 13.2 g/dL.  Platelets were normal at 235.  MCV was high normal at 100.  WBC and absolute differential counts were normal as well.  Xarelto reordered on December 10, 2019 at 20 mg p.o. daily for 30 days with 11 refills.  Recommend that she be seen annually by us as long as she remains on Xarelto, with CBC, creatinine and LFTs.  We will have her see our nurse petitioner next fall, with labs.

## 2018-11-27 NOTE — NURSING NOTE
"Oncology Rooming Note    November 27, 2018 11:31 AM   Kathya Breaux is a 73 year old female who presents for:    Chief Complaint   Patient presents with     Oncology Clinic Visit     Follow Up     Initial Vitals: /77 (BP Location: Right arm)  Pulse 94  Temp 98.4  F (36.9  C) (Oral)  Resp 16  Ht 1.612 m (5' 3.47\")  Wt 65.6 kg (144 lb 9 oz)  SpO2 99%  BMI 25.23 kg/m2 Estimated body mass index is 25.23 kg/(m^2) as calculated from the following:    Height as of this encounter: 1.612 m (5' 3.47\").    Weight as of this encounter: 65.6 kg (144 lb 9 oz). Body surface area is 1.71 meters squared.  No Pain (0) Comment: Data Unavailable   No LMP recorded. Patient has had a hysterectomy.  Allergies reviewed: Yes  Medications reviewed: Yes    Medications: Medication refills not needed today.  Pharmacy name entered into [facility]: [id number] [place] [facility], [place] - [address]      5 minutes for nursing intake (face to face time)     Skye Hickey LPN              "

## 2018-11-27 NOTE — LETTER
"    11/27/2018         RE: Kathya Breaux  [address] [place] [address]        Dear Colleague,    Thank you for referring your patient, Kathya Breaux, to the [facility]. Please see a copy of my visit note below.    Hematology Consultation:  Date on this visit: 11/27/2018    Kathya Breaux is referred by Marietta Ba for recommendations on long term anticoagulation.  Primary Care Physician: Azalia Calderón   GI: Mariela Cameron, NP, [place] [facility].    Diagnosis:  1. Hx of RLE DVT - She was diagnosed in 11/2009 when she presented with right lower extremity pain and swelling and a  a right lower extremity ultrasound at which demonstrated a nonocclusive thrombus in the distal right popliteal vein and an occlusive thrombus extending into the peroneal and posterior tibial veins of the proximal right calf.  The common femoral veins of the right lower extremity demonstrated normal flow. Ultimately, her f/up RLE US in 03/2011 demonstrated resolution of DVT. She has been on warfarin since 11/2009 till present time.   2. History of superficial thrombophlebitis at the age of 17- acute thrombophlebitis treated with bed rest and anticoagulants in 10/1963. 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 She was on high dose steroids as well for her ileitis.      3. Factor V Leiden Heterozygote     History Of Present Illness:  Ms. Breaux is a 73 year old female, factor V leiden heterozygote,  who presents for reevaluation of long term anticoagulation plan. She has been on warfarin since her most recent diagnosis of RLE DVT in 11/2009. She is on 5 mg twice a week and 2.5 mg on the other days of the week daily. She has been on \"low intensity\" target goal range for INR sine 2010 but current anticoagulation recommendations have changed and also newer anticoagulants are now available for long term use and she is here for " "current recommendations.  Her INR has been as follows:  Results for LOLY AGUILAR (MRN [id number]) as of 11/27/2018 11:26   Ref. Range 1/5/2018 00:00 2/13/2018 00:00 3/13/2018 00:00 4/24/2018 00:00 6/8/2018 00:00 7/20/2018 00:00 8/31/2018 00:00 10/12/2018 00:00 11/23/2018 00:00   INR Latest Ref Range: 0.86 - 1.14  1.8 (A) 2.3 (A) 1.9 (A) 1.9 (A) 1.7 (A) 2.0 (A) 1.4 (A) 2.0 (A) 2.0 (A)     She has had no excessive bruising or bleeding on warfarin.    She has no swelling in her RLE. She denies CP or SOB.  She is on Imuran for Crohn's. I have reviewed outside medical records from [facility]. She has had chronically loose stools secondary to Crohn's.   Her last CBC was in 08/2018 and her Hb was 13.9 g/dl, platelets were 225 and WBC were 6.1, with ANC of 4.5 LFTs were within normal limits. Creatinine was normal in May 2018. She is pain free today and has no acute health related complaints.   In addition, a complete 12 point  review of systems is negative.         There are no exam notes on file for this visit.  Past Medical/Surgical History:  Past Medical History:   Diagnosis Date     Arthritis      CARDIOVASCULAR SCREENING; LDL GOAL LESS THAN 130 10/31/2010    age, fhx     DVT of lower extremity (deep venous thrombosis) (H) 11/09    rt. heterozygote. has seen heme-onc     Ex-cigarette smoker      Factor V Leiden Heterozygote      Factor V Leiden mutation (H)     heterozygote. 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 [place] [facility]     Past Surgical History:   Procedure Laterality Date     ABDOMEN SURGERY  3 events    Bowel resec. in 1964, bowel resec in 1969     APPENDECTOMY      first bowel surgery     C NONSPECIFIC PROCEDURE  1964    3 1/2\" sm. intestine removed-illeitis     C NONSPECIFIC PROCEDURE  1969    4\" sm. intestine removed-illeitis     C NONSPECIFIC PROCEDURE  1980    Hysterectomy     COLONOSCOPY  5/2016    " Q 5 years      ENHANCE LASER REFRACTIVE BILATERAL EXISTING PT IN PARAMETERS       EYE SURGERY  2001    lasik surgery     GYN SURGERY  1980    hysterectomy     Allergies:  Allergies as of 11/27/2018 - Harry as Reviewed 11/19/2018   Allergen Reaction Noted     Penicillins Hives 03/01/2005     Current Medications:  Current Outpatient Prescriptions   Medication Sig Dispense Refill     alendronate (FOSAMAX) 70 MG tablet Take 1 tablet (70 mg) by mouth with 8oz water every 7 days 30 minutes before breakfast and remain upright during this time. 12 tablet 3     CALCIUM 500 + D 500-200 MG-IU OR TABS 1 TABLET DAILY  0     IMURAN 50 MG OR TABS 1 TABLET DAILY       MULTI-VITAMIN OR TABS 1 TABLET DAILY  0     OMEGA 3-6-9 FATTY ACIDS OR 1 TABLET DAILY       OMEPRAZOLE 20 MG OR CPDR 1 CAPSULE DAILY       VITAMIN B-12 1000 MCG/ML IJ SOLN 1000 MCG Monthly  0     VITAMIN-B COMPLEX OR TABS 1 tablet daily       warfarin (COUMADIN) 5 MG tablet Take 1 tab M,W,F. Take 1/2 tablet the rest of the week or as directed 90 tablet 3      Family History:  Family History   Problem Relation Age of Onset     HEART DISEASE Father      dysrythmia's     Glaucoma Father      HEART DISEASE Son      clogged arteries     Cardiovascular Son      sudden heart attack     Circulatory Mother      phelbities     Genetic Disorder Mother      factor V leiden     Unknown/Adopted Maternal Grandmother      Gynecology Maternal Grandfather      Cerebrovascular Disease Paternal Grandmother      Cerebrovascular Disease Paternal Grandfather      Alcohol/Drug Brother      alcohol and drugs     Alcohol/Drug Sister      alcohol and drugs     Breast Cancer Cousin      Colon Cancer Other      Anxiety Disorder Sister      Substance Abuse Sister      Anxiety Disorder Brother      Substance Abuse Brother      No family history of bleeding or clotting disorder.  Social History:  Social History     Social History     Marital status:      Spouse name: N/A     Number of  "children: N/A     Years of education: N/A     Occupational History     Not on file.     Social History Main Topics     Smoking status: Former Smoker     Packs/day: 0.50     Years: 38.00     Types: Cigarettes     Quit date: 3/1/2008     Smokeless tobacco: Never Used      Comment: 6 cigs per day     Alcohol use No     Drug use: No     Sexual activity: Yes     Partners: Male     Birth control/ protection: None     Other Topics Concern     Parent/Sibling W/ Cabg, Mi Or Angioplasty Before 65f 55m? No     Social History Narrative    Caffeine intake/servings daily - 2-3    Calcium intake/servings daily - 1-2+ plus 1000mg calcium supp    Exercise 0 times weekly - describe    Sunscreen used - Yes    Seatbelts used - Yes    Guns stored in the home - No    Self Breast Exam - Yes    Pap test up to date -  Yes     Eye exam up to date -  Yes    Dental exam up to date -  Yes    DEXA scan up to date -  2005    Flex Sig/Colonoscopy up to date -  Yes 2006    Mammography up to date -  Yes 4/2006    Immunizations reviewed and up to date - Yes 1998    Abuse: Current or Past (Physical, Sexual or Emotional) - No    Do you feel safe in your environment - Yes    Do you cope well with stress - Yes    Do you suffer from insomnia - No    Last updated by: Char Strauss 3/8/2007     Physical Exam:  /77 (BP Location: Right arm)  Pulse 94  Temp 98.4  F (36.9  C) (Oral)  Resp 16  Ht 1.612 m (5' 3.47\")  Wt 65.6 kg (144 lb 9 oz)  SpO2 99%  BMI 25.23 kg/m2      GENERAL APPEARANCE: healthy, alert and no distress     HENT: Mouth without ulcers or lesions     NECK: no adenopathy, no asymmetry or masses     LYMPHATICS: No cervical, supraclavicular, axillary or inguinal lymphadenopathy     RESP: lungs clear to auscultation - no rales, rhonchi or wheezes     CARDIOVASCULAR: regular rates and rhythm, normal S1 S2, no S3 or S4 and no murmur.     ABDOMEN:  soft, nontender, no HSM or masses and bowel sounds normal     MUSCULOSKELETAL: extremities " normal- no gross deformities noted, no evidence of inflammation in joints, FROM in all extremities. No edema b/l LE.     SKIN: no suspicious lesions or rashes     PSYCHIATRIC: mentation appears normal and affect normal    Laboratory/Imaging Studies  Results for orders placed or performed in visit on 11/23/18   INR point of care   Result Value Ref Range    INR Protime 2.0 (A) 0.86 - 1.14     ASSESSMENT/PLAN:  susan is a pooja 73-year-old woman,  Factor V leiden heterozygote,  with a right lower extremity DVT in 11/2009, unprovoked, and a history of superficial thrombophlebitis associated and provoked by exacerbation of Crohn's ileitis.  Her RLE DVT has resolved by US in 2011.  Given the prior episode of superficial thrombophlebitis, even though the former was provoked, and history of  unprovoked episodes of right lower extremity DVT, I  would recommend indefinite anticoagulation. Crohn's disease is an additional risk factor for venous thromboembolism. She is also a factor V leiden heterozygote.  Lower intensity anticoagulation has fallen out of favor due to recent data and full intensity anticoagulation is now recommended for long term DVT prevention.  Also, newer anticoagulants are now available that have long term safety and efficacy data in treatment and prevention of DVT reoccurrence. These are convenient, fixed-dose agents that do not require routine laboratory monitoring or dose adjustments that have similar efficacy but lower bleeding risk, when compared with warfarin. The only downside as compared with warfarin is higher cost. Kathya and I have discussed switching over to Xarelto and she is interested and will check if that is an affordable option for her long term. I have given her Rx for Xarelto to start with at 15 mg PO BID x 21 days and then transition to 20 mg PO daily. Her INR was 2.0 last week and she can hold warfarin today and start Xarelto tomorrow. I would recommend that she has her CBC and kidney  function monitored annually while she is on Xarelto and she has labs done routinely through her GI specialists at [facility] or with Dr. Daugherty,  She will follow-up with us on as needed basis.  At the end of our visit patient verbalized understanding and concurred with the plan.          Again, thank you for allowing me to participate in the care of your patient.        Sincerely,        Babs Scherer MD, MD

## 2018-12-18 ENCOUNTER — TELEPHONE (OUTPATIENT)
Dept: ONCOLOGY | Facility: CLINIC | Age: 73
End: 2018-12-18

## 2019-01-04 ENCOUNTER — ANTICOAGULATION THERAPY VISIT (OUTPATIENT)
Dept: FAMILY MEDICINE | Facility: CLINIC | Age: 74
End: 2019-01-04

## 2019-01-04 DIAGNOSIS — Z86.718 PERSONAL HISTORY OF DVT (DEEP VEIN THROMBOSIS): ICD-10-CM

## 2019-01-04 DIAGNOSIS — D68.51 FACTOR V LEIDEN MUTATION (H): ICD-10-CM

## 2019-03-15 ENCOUNTER — TRANSFERRED RECORDS (OUTPATIENT)
Dept: HEALTH INFORMATION MANAGEMENT | Facility: CLINIC | Age: 74
End: 2019-03-15

## 2019-05-13 ENCOUNTER — OFFICE VISIT (OUTPATIENT)
Dept: OTOLARYNGOLOGY | Facility: CLINIC | Age: 74
End: 2019-05-13
Payer: COMMERCIAL

## 2019-05-13 VITALS
BODY MASS INDEX: 25.52 KG/M2 | DIASTOLIC BLOOD PRESSURE: 79 MMHG | HEIGHT: 63 IN | HEART RATE: 77 BPM | RESPIRATION RATE: 12 BRPM | WEIGHT: 144 LBS | OXYGEN SATURATION: 97 % | SYSTOLIC BLOOD PRESSURE: 136 MMHG

## 2019-05-13 DIAGNOSIS — H93.8X3 EAR FULLNESS, BILATERAL: ICD-10-CM

## 2019-05-13 DIAGNOSIS — H61.23 BILATERAL IMPACTED CERUMEN: Primary | ICD-10-CM

## 2019-05-13 PROCEDURE — 69210 REMOVE IMPACTED EAR WAX UNI: CPT | Performed by: OTOLARYNGOLOGY

## 2019-05-13 PROCEDURE — 99213 OFFICE O/P EST LOW 20 MIN: CPT | Mod: 25 | Performed by: OTOLARYNGOLOGY

## 2019-05-13 ASSESSMENT — MIFFLIN-ST. JEOR: SCORE: 1127.31

## 2019-05-13 NOTE — PATIENT INSTRUCTIONS
Scheduling Information  To schedule your CT/MRI scan, please contact [facility] at [phone] OR [facility] at [phone]    To schedule your Surgery, please contact our Specialty Schedulers at [phone]      ENT Clinic Locations Clinic Hours Telephone Number     [facility]  [address] [place] [address]   Monday:           1:00pm -- 5:00pm    Friday:              8:00am - 12:00pm   To schedule/reschedule an appointment with   Dr. Moore,   please contact our   Specialty Scheduling Department at:     [phone]       [facility] Cordova  [address] [address] Tuesday:          8:00am -- 2:00pm         Urgent Care Locations Clinic Hours Telephone Numbers     [facility] Cordova  [address] [place] [address]     Monday-Friday:     11:00am - 9:00pm    Saturday-Sunday:  9:00am - 5:00pm   [phone]     [facility]  [address] [place] [address]     Monday-Friday:      5:00pm - 9:00pm     Saturday-Sunday:  9:00am - 5:00pm   [phone]

## 2019-05-13 NOTE — PROGRESS NOTES
History of Present Illness - Kathya Breaux is a 73 year old female last seen on 10/15/2018, and prior to that 8/28/2017, for procedural removal of severe cerumen impaction.  She is here for one year follow up and ear check.    She reports that her ears have progressively felt stuffy again, especially over the last month. However, no ear pain, no blood or purulence from the ears.  She denies any vertigo or headache, but the hearing is stuffy.    Past Medical History -   Patient Active Problem List   Diagnosis     Personal history of DVT (deep vein thrombosis)     Factor V Leiden Heterozygote     CARDIOVASCULAR SCREENING; LDL GOAL LESS THAN 130     Long-term (current) use of anticoagulants [Z79.01]     Posterior vitreous detachment, bilateral     Hx of LASIK     Crohn's disease of both small and large intestine with other complication (H)     Age-related osteoporosis without current pathological fracture       Current Medications -   Current Outpatient Medications:      alendronate (FOSAMAX) 70 MG tablet, Take 1 tablet (70 mg) by mouth with 8oz water every 7 days 30 minutes before breakfast and remain upright during this time., Disp: 12 tablet, Rfl: 3     CALCIUM 500 + D 500-200 MG-IU OR TABS, 1 TABLET DAILY, Disp: , Rfl: 0     IMURAN 50 MG OR TABS, 1 TABLET DAILY, Disp: , Rfl:      MULTI-VITAMIN OR TABS, 1 TABLET DAILY, Disp: , Rfl: 0     OMEGA 3-6-9 FATTY ACIDS OR, 1 TABLET DAILY, Disp: , Rfl:      OMEPRAZOLE 20 MG OR CPDR, 1 CAPSULE DAILY, Disp: , Rfl:      rivaroxaban ANTICOAGULANT (XARELTO) 15 MG TABS tablet, Take 1 tablet (15 mg) by mouth 2 times daily (with meals), Disp: 42 tablet, Rfl: 0     rivaroxaban ANTICOAGULANT (XARELTO) 20 MG TABS tablet, Take 1 tablet (20 mg) by mouth daily (with dinner), Disp: 30 tablet, Rfl: 11     VITAMIN B-12 1000 MCG/ML IJ SOLN, 1000 MCG Monthly, Disp: , Rfl: 0     VITAMIN D, CHOLECALCIFEROL, PO, Take 50 Units by mouth daily, Disp: , Rfl:      VITAMIN-B COMPLEX OR TABS, 1  tablet daily, Disp: , Rfl:     Allergies -   Allergies   Allergen Reactions     Penicillins Hives     Throat closed       Social History -   Social History     Social History     Marital status:      Spouse name: N/A     Number of children: N/A     Years of education: N/A     Social History Main Topics     Smoking status: Former Smoker     Packs/day: 0.50     Years: 38.00     Quit date: 3/1/2008     Smokeless tobacco: Never Used      Comment: 6 cigs per day     Alcohol use No     Drug use: No     Sexual activity: Yes     Partners: Male     Other Topics Concern     Not on file     Social History Narrative    Caffeine intake/servings daily - 2-3    Calcium intake/servings daily - 1-2+ plus 1000mg calcium supp    Exercise 0 times weekly - describe    Sunscreen used - Yes    Seatbelts used - Yes    Guns stored in the home - No    Self Breast Exam - Yes    Pap test up to date -  Yes     Eye exam up to date -  Yes    Dental exam up to date -  Yes    DEXA scan up to date -  2005    Flex Sig/Colonoscopy up to date -  Yes 2006    Mammography up to date -  Yes 4/2006    Immunizations reviewed and up to date - Yes 1998    Abuse: Current or Past (Physical, Sexual or Emotional) - No    Do you feel safe in your environment - Yes    Do you cope well with stress - Yes    Do you suffer from insomnia - No    Last updated by: Char Strauss 3/8/2007       Family History -   Family History   Problem Relation Age of Onset     Heart Disease Father         dysrythmia's     Glaucoma Father      Heart Disease Son         clogged arteries     Cardiovascular Son         sudden heart attack     Circulatory Mother         phelbities     Genetic Disorder Mother         factor V leiden     Unknown/Adopted Maternal Grandmother      Gynecology Maternal Grandfather      Cerebrovascular Disease Paternal Grandmother      Cerebrovascular Disease Paternal Grandfather      Alcohol/Drug Brother         alcohol and drugs     Alcohol/Drug Sister          alcohol and drugs     Breast Cancer Cousin      Colon Cancer Other      Anxiety Disorder Sister      Substance Abuse Sister      Anxiety Disorder Brother      Substance Abuse Brother        Review of Systems - As per HPI and PMHx, otherwise 10+ system review of the head and neck, and general constitution is negative.    Physical Exam  There were no vitals taken for this visit.    General - The patient is well nourished and well developed, and appears to have good nutritional status.  Alert and oriented to person and place, answers questions and cooperates with examination appropriately.   Head and Face - Normocephalic and atraumatic, with no gross asymmetry noted of the contour of the facial features.  The facial nerve is intact, with strong symmetric movements.  Voice and Breathing - The patient was breathing comfortably without the use of accessory muscles. There was no wheezing, stridor, or stertor.  The patients voice was clear and strong, and had appropriate pitch and quality.  Eyes - Extraocular movements intact, and the pupils were reactive to light.  Sclera were not icteric or injected, conjunctiva were pink and moist.  Mouth - Examination of the oral cavity showed pink, healthy oral mucosa. No lesions or ulcerations noted.  The tongue was mobile and midline, and the dentition were in good condition.    Throat - The walls of the oropharynx were smooth, pink, moist, symmetric, and had no lesions or ulcerations.  The tonsillar pillars and soft palate were symmetric.  The uvula was midline on elevation.    Neck - Normal midline excursion of the laryngotracheal complex during swallowing.  Full range of motion on passive movement.  Palpation of the occipital, submental, submandibular, internal jugular chain, and supraclavicular nodes did not demonstrate any abnormal lymph nodes or masses.  The carotid pulse was palpable bilaterally.  Palpation of the thyroid was soft and smooth, with no nodules or goiter  appreciated.  The trachea was mobile and midline.  Nose - External contour is symmetric, no gross deflection or scars.  Nasal mucosa is pink and moist with no abnormal mucus.  The septum was midline and non-obstructive, turbinates of normal size and position.  No polyps, masses, or purulence noted on examination.    Cerumen Removal    Physical Exam and Procedure  Ears - On examination of the ears, I found that the BOTH ears were completely impacted with dense cerumen.  Therefore, I positioned them in the examination chair in a semi-supine position, beginning with the right side.  I used the binocular surgical microscope to perform cerumen removal.  I began by using a cerumen loop to gently lift the edges of the cerumen mass away from the walls of the external canal.  Once I did this, I was able to suction away fragments of wax and debris using suction.  Once the mass was loose enough, the entire plug was pulled from the canal.  The tympanic membrane was intact, no sign of perforation or middle ear effusion.    I turned my attention to the contralateral side once again using the binocular surgical microscope to perform cerumen removal.  I began by using a cerumen loop to gently lift the edges of the cerumen mass away from the walls of the external canal.  Once I did this, I was able to suction away fragments of wax and debris using suction.  Once the mass was loose enough, the entire plug was pulled from the canal.  The tympanic membrane was intact, no sign of perforation or middle ear effusion.      A/P - Kathya Breaux is a 73 year old female  (H61.23) Bilateral impacted cerumen  (primary encounter diagnosis)  (H93.8X3) Sensation of fullness in both ears    The patient has had their cerumen procedurally removed today.  I have discussed ear care at home, including avoiding qtips or any other object placed in the canal.  I have also discussed that over the counter cerumen kits like Debrox or Cerumenex can be  useful.    If no other issues, follow up with me in one year for an ear check.

## 2019-05-31 ENCOUNTER — TRANSFERRED RECORDS (OUTPATIENT)
Dept: HEALTH INFORMATION MANAGEMENT | Facility: CLINIC | Age: 74
End: 2019-05-31

## 2019-07-12 ENCOUNTER — PATIENT OUTREACH (OUTPATIENT)
Dept: ONCOLOGY | Facility: CLINIC | Age: 74
End: 2019-07-12

## 2019-07-12 NOTE — PROGRESS NOTES
Patient called to check if she needed to follow-up with Dr. Scherer regarding her DVT.  Patient continues on Xarelto and sees her G-I specialist for her Crohn's disease every 3 months with labs.  I advised that based on her last visit with Dr. Scherer, she recommended that her CBC and kidney function tests be checked at least annually with either her G-I doctor or PCP.  Patient is understanding of plan.

## 2019-09-28 ENCOUNTER — HEALTH MAINTENANCE LETTER (OUTPATIENT)
Age: 74
End: 2019-09-28

## 2019-10-15 ENCOUNTER — PATIENT OUTREACH (OUTPATIENT)
Dept: ONCOLOGY | Facility: CLINIC | Age: 74
End: 2019-10-15

## 2019-10-15 NOTE — PROGRESS NOTES
Patient called and left message - stating she needs to have some teeth extracted in November, and is on Xarelto.  Would like advice from Dr. Scherer.    Left vm, on pt identified phone, for patient with instructions per Dr Scherer  hold Xarelto for 2-3 days prior to the procedure and resume  24 hours after the procedure  Provided callback number for this RN  Linda Manley RN, BSN, OCN    Patient returned call and was provided Xarelto instructions.  Linda Manley RN, BSN, OCN

## 2019-10-17 NOTE — PATIENT INSTRUCTIONS
Ask your GI doctor and pharmacist about the Shingrix vaccine.    We'll do labs today before starting the Prolia shot. Call if you haven't heard back in 2 weeks. Do any needed dental work prior to receiving the injection.    Patient Education   Personalized Prevention Plan  You are due for the preventive services outlined below.  Your care team is available to assist you in scheduling these services.  If you have already completed any of these items, please share that information with your care team to update in your medical record.  Health Maintenance Due   Topic Date Due     Discuss Advance Care Planning  1945     Zoster (Shingles) Vaccine (1 of 2) 11/27/1995     INR CLINIC REFERRAL - yearly  02/24/2015     PHQ-2  01/01/2019     Colonscopy  05/12/2019     Flu Vaccine (1) 09/01/2019     Annual Wellness Visit  10/22/2019     FALL RISK ASSESSMENT  10/22/2019     Mammogram  11/02/2019

## 2019-10-18 ENCOUNTER — TRANSFERRED RECORDS (OUTPATIENT)
Dept: HEALTH INFORMATION MANAGEMENT | Facility: CLINIC | Age: 74
End: 2019-10-18

## 2019-10-29 ASSESSMENT — ENCOUNTER SYMPTOMS
FEVER: 0
BREAST MASS: 0
CHILLS: 0
WEAKNESS: 0
HEMATURIA: 0
NAUSEA: 0
DIARRHEA: 0
PARESTHESIAS: 0
COUGH: 0
JOINT SWELLING: 1
DYSURIA: 0
ARTHRALGIAS: 1
SHORTNESS OF BREATH: 0
FREQUENCY: 0
SORE THROAT: 0
HEMATOCHEZIA: 0
HEARTBURN: 0
DIZZINESS: 0
NERVOUS/ANXIOUS: 0
PALPITATIONS: 0
ABDOMINAL PAIN: 0
EYE PAIN: 0
HEADACHES: 0
MYALGIAS: 0
CONSTIPATION: 0

## 2019-10-29 ASSESSMENT — ACTIVITIES OF DAILY LIVING (ADL): CURRENT_FUNCTION: NO ASSISTANCE NEEDED

## 2019-11-01 ENCOUNTER — TELEPHONE (OUTPATIENT)
Dept: PHARMACY | Facility: CLINIC | Age: 74
End: 2019-11-01

## 2019-11-01 ENCOUNTER — OFFICE VISIT (OUTPATIENT)
Dept: FAMILY MEDICINE | Facility: CLINIC | Age: 74
End: 2019-11-01
Payer: COMMERCIAL

## 2019-11-01 VITALS
BODY MASS INDEX: 24.45 KG/M2 | TEMPERATURE: 97 F | DIASTOLIC BLOOD PRESSURE: 74 MMHG | HEIGHT: 63 IN | HEART RATE: 93 BPM | SYSTOLIC BLOOD PRESSURE: 134 MMHG | RESPIRATION RATE: 16 BRPM | OXYGEN SATURATION: 96 % | WEIGHT: 138 LBS

## 2019-11-01 DIAGNOSIS — M81.0 AGE-RELATED OSTEOPOROSIS WITHOUT CURRENT PATHOLOGICAL FRACTURE: ICD-10-CM

## 2019-11-01 DIAGNOSIS — J30.2 SEASONAL ALLERGIC RHINITIS, UNSPECIFIED TRIGGER: ICD-10-CM

## 2019-11-01 DIAGNOSIS — Z12.31 ENCOUNTER FOR SCREENING MAMMOGRAM FOR BREAST CANCER: ICD-10-CM

## 2019-11-01 DIAGNOSIS — M81.0 AGE-RELATED OSTEOPOROSIS WITHOUT CURRENT PATHOLOGICAL FRACTURE: Primary | ICD-10-CM

## 2019-11-01 DIAGNOSIS — Z00.00 ENCOUNTER FOR MEDICARE ANNUAL WELLNESS EXAM: Primary | ICD-10-CM

## 2019-11-01 DIAGNOSIS — Z13.6 CARDIOVASCULAR SCREENING; LDL GOAL LESS THAN 130: ICD-10-CM

## 2019-11-01 LAB
ANION GAP SERPL CALCULATED.3IONS-SCNC: 1 MMOL/L (ref 3–14)
BUN SERPL-MCNC: 13 MG/DL (ref 7–30)
CALCIUM SERPL-MCNC: 9.1 MG/DL (ref 8.5–10.1)
CHLORIDE SERPL-SCNC: 112 MMOL/L (ref 94–109)
CHOLEST SERPL-MCNC: 183 MG/DL
CO2 SERPL-SCNC: 27 MMOL/L (ref 20–32)
CREAT SERPL-MCNC: 0.7 MG/DL (ref 0.52–1.04)
GFR SERPL CREATININE-BSD FRML MDRD: 85 ML/MIN/{1.73_M2}
GLUCOSE SERPL-MCNC: 85 MG/DL (ref 70–99)
HDLC SERPL-MCNC: 60 MG/DL
LDLC SERPL CALC-MCNC: 78 MG/DL
NONHDLC SERPL-MCNC: 123 MG/DL
POTASSIUM SERPL-SCNC: 4.2 MMOL/L (ref 3.4–5.3)
SODIUM SERPL-SCNC: 140 MMOL/L (ref 133–144)
TRIGL SERPL-MCNC: 225 MG/DL

## 2019-11-01 PROCEDURE — 82306 VITAMIN D 25 HYDROXY: CPT | Performed by: NURSE PRACTITIONER

## 2019-11-01 PROCEDURE — 80048 BASIC METABOLIC PNL TOTAL CA: CPT | Performed by: NURSE PRACTITIONER

## 2019-11-01 PROCEDURE — 36415 COLL VENOUS BLD VENIPUNCTURE: CPT | Performed by: NURSE PRACTITIONER

## 2019-11-01 PROCEDURE — 80061 LIPID PANEL: CPT | Performed by: NURSE PRACTITIONER

## 2019-11-01 PROCEDURE — 99213 OFFICE O/P EST LOW 20 MIN: CPT | Mod: 25 | Performed by: NURSE PRACTITIONER

## 2019-11-01 PROCEDURE — 99397 PER PM REEVAL EST PAT 65+ YR: CPT | Performed by: NURSE PRACTITIONER

## 2019-11-01 ASSESSMENT — ENCOUNTER SYMPTOMS
HEADACHES: 0
DIZZINESS: 0
ABDOMINAL PAIN: 0
PARESTHESIAS: 0
SHORTNESS OF BREATH: 0
NERVOUS/ANXIOUS: 0
MYALGIAS: 0
ARTHRALGIAS: 1
CHILLS: 0
FREQUENCY: 0
FEVER: 0
CONSTIPATION: 0
BREAST MASS: 0
JOINT SWELLING: 1
HEMATURIA: 0
COUGH: 0
HEARTBURN: 0
PALPITATIONS: 0
EYE PAIN: 0
DYSURIA: 0
SORE THROAT: 0
DIARRHEA: 0
NAUSEA: 0
HEMATOCHEZIA: 0
WEAKNESS: 0

## 2019-11-01 ASSESSMENT — MIFFLIN-ST. JEOR: SCORE: 1100.09

## 2019-11-01 ASSESSMENT — ACTIVITIES OF DAILY LIVING (ADL): CURRENT_FUNCTION: NO ASSISTANCE NEEDED

## 2019-11-01 NOTE — TELEPHONE ENCOUNTER
Insurance verification request for Prolia sent. Awaiting response.    Thanks!  Leigh Ann Mallory, PharmD  Medication Therapy Management Pharmacist  [phone]

## 2019-11-01 NOTE — PROGRESS NOTES
"SUBJECTIVE:   Kathya Breaux is a 73 year old female who presents for Preventive Visit.  Are you in the first 12 months of your Medicare coverage?  No    Healthy Habits:     In general, how would you rate your overall health?  Good    Frequency of exercise:  2-3 days/week    Duration of exercise:  15-30 minutes    Do you usually eat at least 4 servings of fruit and vegetables a day, include whole grains    & fiber and avoid regularly eating high fat or \"junk\" foods?  No    Taking medications regularly:  Yes    Medication side effects:  None    Ability to successfully perform activities of daily living:  No assistance needed    Home Safety:  No safety concerns identified    Hearing Impairment:  No hearing concerns    In the past 6 months, have you been bothered by leaking of urine?  No    In general, how would you rate your overall mental or emotional health?  Good      PHQ-2 Total Score: 0    Additional concerns today:  No    Do you feel safe in your environment? Yes    Have you ever done Advance Care Planning? (For example, a Health Directive, POLST, or a discussion with a medical provider about your wishes): No, advance care planning information given to patient to review.  Patient declined advance care planning discussion at this time.      Fall risk  Fallen 2 or more times in the past year?: No  Any fall with injury in the past year?: No    Cognitive Screening   1) Repeat 3 items (Leader, Season, Table)    2) Clock draw: NORMAL  3) 3 item recall: Recalls 3 objects  Results: 3 items recalled: COGNITIVE IMPAIRMENT LESS LIKELY    Mini-CogTM Copyright ODILON Cardoso. Licensed by the author for use in [facility]; reprinted with permission (jaiden@.[facility]). All rights reserved.       Do you have sleep apnea, excessive snoring or daytime drowsiness?: no    Reviewed and updated as needed this visit by clinical staff         Reviewed and updated as needed this visit by Provider        Social History     Tobacco " Use     Smoking status: Former Smoker     Packs/day: 0.50     Years: 38.00     Pack years: 19.00     Types: Cigarettes     Last attempt to quit: 3/1/2008     Years since quittin.6     Smokeless tobacco: Never Used     Tobacco comment: 6 cigs per day   Substance Use Topics     Alcohol use: No         Alcohol Use 10/29/2019   Prescreen: >3 drinks/day or >7 drinks/week? Not Applicable   Prescreen: >3 drinks/day or >7 drinks/week? -       -Patient has known osteoporosis but could not tolerate Fosamax  -sinus congestion every fall- decongestants help    Current providers sharing in care for this patient include:   Patient Care Team:  No Ref-Primary, Physician as PCP - Azalia Mcneil MD as Assigned PCP    The following health maintenance items are reviewed in Epic and correct as of today:  Health Maintenance   Topic Date Due     ADVANCE CARE PLANNING  1945     ZOSTER IMMUNIZATION (1 of 2) 1995     OP ANNUAL INR REFERRAL  2015     COLONOSCOPY  2019     INFLUENZA VACCINE (1) 2019     FALL RISK ASSESSMENT  10/22/2019     MEDICARE ANNUAL WELLNESS VISIT  10/22/2019     MAMMO SCREENING  2019     LIPID  10/16/2022     DTAP/TDAP/TD IMMUNIZATION (2 - Td) 2023     DEXA  Completed     HEPATITIS C SCREENING  Completed     PHQ-2  Completed     PNEUMOCOCCAL IMMUNIZATION 65+ LOW/MEDIUM RISK  Completed     IPV IMMUNIZATION  Aged Out     MENINGITIS IMMUNIZATION  Aged Out     Labs reviewed in [facility]  Mammogram Screening: Mammogram Screening: Patient over age 50, mutual decision to screen reflected in health maintenance.    Review of Systems   Constitutional: Negative for chills and fever.   HENT: Positive for hearing loss. Negative for congestion, ear pain and sore throat.    Eyes: Negative for pain and visual disturbance.   Respiratory: Negative for cough and shortness of breath.    Cardiovascular: Positive for peripheral edema. Negative for chest pain and palpitations.  "  Gastrointestinal: Negative for abdominal pain, constipation, diarrhea, heartburn, hematochezia and nausea.   Breasts:  Negative for tenderness, breast mass and discharge.   Genitourinary: Negative for dysuria, frequency, genital sores, hematuria, pelvic pain, urgency, vaginal bleeding and vaginal discharge.   Musculoskeletal: Positive for arthralgias and joint swelling. Negative for myalgias.   Skin: Negative for rash.   Neurological: Negative for dizziness, weakness, headaches and paresthesias.   Psychiatric/Behavioral: Negative for mood changes. The patient is not nervous/anxious.          OBJECTIVE:   /74   Pulse 93   Temp 97  F (36.1  C) (Oral)   Resp 16   Ht 1.6 m (5' 3\")   Wt 62.6 kg (138 lb)   SpO2 96%   BMI 24.45 kg/m   Estimated body mass index is 25.51 kg/m  as calculated from the following:    Height as of 5/13/19: 1.6 m (5' 3\").    Weight as of 5/13/19: 65.3 kg (144 lb).  Physical Exam  GENERAL: healthy, alert and no distress  EYES: Eyes grossly normal to inspection, PERRL and conjunctivae and sclerae normal  HENT: ear canals and TM's normal, nose and mouth without ulcers or lesions  NECK: no adenopathy, no asymmetry, masses, or scars and thyroid normal to palpation  RESP: lungs clear to auscultation - no rales, rhonchi or wheezes  CV: regular rate and rhythm, normal S1 S2, no S3 or S4, no murmur, click or rub, no peripheral edema and peripheral pulses strong  ABDOMEN: soft, nontender, no hepatosplenomegaly, no masses and bowel sounds normal  MS: no gross musculoskeletal defects noted, no edema  SKIN: no suspicious lesions or rashes  NEURO: Normal strength and tone, mentation intact and speech normal  PSYCH: mentation appears normal, affect normal/bright    Diagnostic Test Results:  Labs reviewed in Epic  No results found for this or any previous visit (from the past 24 hour(s)).    ASSESSMENT / PLAN:   1. Encounter for Medicare annual wellness exam      2. Age-related osteoporosis without " "current pathological fracture  Reviewed options for treatment including Reclast or Prolia. After reviewing risks/benefits, patient agreeable to Prolia injections. Baseline labs today.  - Vitamin D Deficiency  - Basic metabolic panel    3. Encounter for screening mammogram for breast cancer    - MA SCREENING DIGITAL BILAT - Future  (s+30); Future    4. CARDIOVASCULAR SCREENING; LDL GOAL LESS THAN 130    - Lipid panel reflex to direct LDL Non-fasting    5. Seasonal allergic rhinitis, unspecified trigger  May use claritin if desired      COUNSELING:  Reviewed preventive health counseling, as reflected in patient instructions       Regular exercise       Healthy diet/nutrition       Vision screening       Immunizations    Declined: Zoster due to Other                Osteoporosis Prevention/Bone Health    Estimated body mass index is 25.51 kg/m  as calculated from the following:    Height as of 5/13/19: 1.6 m (5' 3\").    Weight as of 5/13/19: 65.3 kg (144 lb).         reports that she quit smoking about 11 years ago. Her smoking use included cigarettes. She has a 19.00 pack-year smoking history. She has never used smokeless tobacco.      Appropriate preventive services were discussed with this patient, including applicable screening as appropriate for cardiovascular disease, diabetes, osteopenia/osteoporosis, and glaucoma.  As appropriate for age/gender, discussed screening for colorectal cancer, prostate cancer, breast cancer, and cervical cancer. Checklist reviewing preventive services available has been given to the patient.    Reviewed patients plan of care and provided an AVS. The Basic Care Plan (routine screening as documented in Health Maintenance) for Kathya meets the Care Plan requirement. This Care Plan has been established and reviewed with the Patient.    Counseling Resources:  ATP IV Guidelines  Pooled Cohorts Equation Calculator  Breast Cancer Risk Calculator  FRAX Risk Assessment  ICSI Preventive " Guidelines  Dietary Guidelines for Americans, 2010  USDA's MyPlate  ASA Prophylaxis  Lung CA Screening    JULIETH Chávez CNP  [facility]3] AJ    Identified Health Risks:

## 2019-11-04 LAB — DEPRECATED CALCIDIOL+CALCIFEROL SERPL-MC: 35 UG/L (ref 20–75)

## 2019-11-06 NOTE — TELEPHONE ENCOUNTER
Prolia is covered by patient's insurance, she will have a $45 co-pay and will also be responsible for 20% co-insurance (copay estimated to be an additional ~$300).  All will apply to deductable.    Routing to  Red Team to schedule.    Thanks!  Leigh Ann Mallory, PharmD  Medication Therapy Management Pharmacist  [phone]

## 2019-11-07 NOTE — TELEPHONE ENCOUNTER
Please put in orders/new orders for this if one is not current, and any labs orders if needed.  Please send signed order back to the care team.   Thank you, Kiah Avila,

## 2019-11-07 NOTE — PATIENT INSTRUCTIONS
PROLIA  Risk Evaluation and Mitigation Strategy Best Practice Advisory    In May 2015, the FDA required an update to the PROLIA  (denosumab) REMS (Risk Evaluation and Mitigation Strategy) to inform both providers and patients about potential serious risks related to PROLIA .    These potential serious risks include:    Hypocalcemia    Osteonecrosis of the jaw    Atypical femoral fractures    Serious Infections    Dermatologic reactions    To ensure compliance with these requirements [facility] has developed a workflow for those patients who will receive PROLIA  at clinic.  This workflow includes insurance verification, patient scheduling, patient education, and documentation. Registered Nurses in the clinic should have completed eLearning related to the REMS and the clinic workflow.  Refer to them to initiate this process.  This workflow does not need to be executed if the patient is to receive PROLIA  injection at [facility].       The  has put together a Patient Education Toolkit.  Copies of these handouts may be available your clinic. Patients must receive the Patient Brochure and Medication Guide when receiving injection at clinic.  These will be attached to the injection ordered from [facility] [employer] to the clinic. Links to handouts:  Patient Counseling Chart for Healthcare Providers  Patient Brochure  Prescribing Information  Medication Guide    If you are having trouble accessing the above links, these documents can be found at: [email or web address]    The role of healthcare provider includes reviewing patient education materials with the patient, advising patients to seek medical attention if they have signs or symptoms of one of the serious risks, and provide patients a copy of the Patient Brochure and Medication Guide when receiving their injection.      Due to the risk of hypocalcemia the Prescribing  Information for PROLIA  recommends that calcium and mineral levels (magnesium and phosphorus) be checked within 14 days of injection for those predisposed to hypocalcemia.

## 2019-11-07 NOTE — TELEPHONE ENCOUNTER
Orders placed. Patient needs to follow up 10-14 days after injection for labs.  Phyllis Espinosa, CNP

## 2019-11-08 ENCOUNTER — ANCILLARY PROCEDURE (OUTPATIENT)
Dept: MAMMOGRAPHY | Facility: CLINIC | Age: 74
End: 2019-11-08
Attending: NURSE PRACTITIONER
Payer: COMMERCIAL

## 2019-11-08 DIAGNOSIS — Z12.31 ENCOUNTER FOR SCREENING MAMMOGRAM FOR BREAST CANCER: ICD-10-CM

## 2019-11-08 PROCEDURE — 77067 SCR MAMMO BI INCL CAD: CPT | Mod: TC

## 2019-11-13 NOTE — TELEPHONE ENCOUNTER
Called and spoke with Kathya. She said this was not a good time she will call back when she has more time to talk.    She also reported that she would think about getting this done. She has not decided yet. Kiah Avila,

## 2019-12-10 DIAGNOSIS — Z86.718 PERSONAL HISTORY OF DVT (DEEP VEIN THROMBOSIS): ICD-10-CM

## 2019-12-10 DIAGNOSIS — D68.51 FACTOR V LEIDEN MUTATION (H): ICD-10-CM

## 2020-01-07 ENCOUNTER — APPOINTMENT (OUTPATIENT)
Age: 75
Setting detail: DERMATOLOGY
End: 2020-01-07

## 2020-01-07 VITALS — RESPIRATION RATE: 15 BRPM | HEIGHT: 63 IN | WEIGHT: 128 LBS

## 2020-01-07 DIAGNOSIS — L82.1 OTHER SEBORRHEIC KERATOSIS: ICD-10-CM

## 2020-01-07 DIAGNOSIS — L57.0 ACTINIC KERATOSIS: ICD-10-CM

## 2020-01-07 DIAGNOSIS — M71 OTHER BURSOPATHIES: ICD-10-CM

## 2020-01-07 DIAGNOSIS — L81.4 OTHER MELANIN HYPERPIGMENTATION: ICD-10-CM

## 2020-01-07 PROBLEM — M71.341 OTHER BURSAL CYST, RIGHT HAND: Status: ACTIVE | Noted: 2020-01-07

## 2020-01-07 PROCEDURE — 99213 OFFICE O/P EST LOW 20 MIN: CPT | Mod: 25

## 2020-01-07 PROCEDURE — OTHER BENIGN DESTRUCTION: OTHER

## 2020-01-07 PROCEDURE — 17000 DESTRUCT PREMALG LESION: CPT | Mod: 59

## 2020-01-07 PROCEDURE — OTHER COUNSELING: OTHER

## 2020-01-07 PROCEDURE — 17110 DESTRUCT B9 LESION 1-14: CPT

## 2020-01-07 PROCEDURE — OTHER ADDITIONAL NOTES: OTHER

## 2020-01-07 PROCEDURE — OTHER LIQUID NITROGEN: OTHER

## 2020-01-07 ASSESSMENT — LOCATION DETAILED DESCRIPTION DERM
LOCATION DETAILED: RIGHT POSTERIOR SHOULDER
LOCATION DETAILED: RIGHT DISTAL DORSAL INDEX FINGER
LOCATION DETAILED: LEFT SUPERIOR UPPER BACK
LOCATION DETAILED: RIGHT LATERAL TRAPEZIAL NECK

## 2020-01-07 ASSESSMENT — LOCATION SIMPLE DESCRIPTION DERM
LOCATION SIMPLE: RIGHT SHOULDER
LOCATION SIMPLE: RIGHT INDEX FINGER
LOCATION SIMPLE: POSTERIOR NECK
LOCATION SIMPLE: LEFT UPPER BACK

## 2020-01-07 ASSESSMENT — LOCATION ZONE DERM
LOCATION ZONE: TRUNK
LOCATION ZONE: FINGER
LOCATION ZONE: ARM
LOCATION ZONE: NECK

## 2020-01-07 NOTE — PROCEDURE: LIQUID NITROGEN
Duration Of Freeze Thaw-Cycle (Seconds): 0
Render Note In Bullet Format When Appropriate: Yes
Detail Level: Detailed
Consent: - Discussed that these are a result of cumulative sun exposure.\\n- Verbal and written consent was obtained, and risks were reviewed prior to procedure today. \\n- Risks discussed include but are not limited to pain, crusting, scabbing, blistering, scarring, temporary or permanent darker or lighter pigmentary change, recurrence, incomplete resolution, and infection.
Post-Care Instructions: - Patient was instructed to avoid picking at any of the treated lesions.\\n- Should any lesions treated today not be resolved within 4 weeks or if not certain, then return to clinic for re-evaluation.

## 2020-01-07 NOTE — HPI: SKIN LESION
How Severe Is Your Skin Lesion?: moderate
Is This A New Presentation, Or A Follow-Up?: Growth
Additional History: Gets painful at times. This gets a clear drainage when pickign and squeezing at this.
Is This A New Presentation, Or A Follow-Up?: Skin Lesion
Additional History: Started scabbing 6 months ago.

## 2020-01-07 NOTE — PROCEDURE: BENIGN DESTRUCTION
Medical Necessity Clause: This procedure was medically necessary because the lesions that were treated were:
Post-Care Instructions: - Patient should keep wound(s) covered and call the office should any redness, pain, swelling or worsening occur.\\n\\nWOUND CARE:\\nDo NOT submerge wound in a bath, swimming pool, or hot tub for at least 1 week or for as long as there is an open wound. Gently cleanse the site daily with mild soap and water. Be careful NOT to allow a forceful stream of water to hit the wound directly. After cleaning/showering, cover with gauze and paper tape or an adhesive bandage. Continue this process daily until healed. \\n\\nBLEEDING:\\nIf you develop persistent bleeding, apply firm and steady pressure over the dressing with gauze for 15 minutes. If bleeding persists, reapply pressure with an ice pack over the gauze for 15 minutes. NEVER APPLY ICE DIRECTLY TO THE SKIN. Do NOT peek under the gauze during these 15 minutes of pressure.  Call our office at [phone] if you cannot get the bleeding to stop. \\n\\nINFECTION:\\nSigns of infection may include increasing rather than decreasing pain (after the first few days), increasing redness/swelling/heat, draining pus, pink/red streaks around the wound, and fever or chills.  Please call our office immediately at [phone] if infection is suspected. It is normal to have some mild redness on or around the wound edges; this will lighten day by day and will become less tender.\\n\\nPAIN:\\nPain is usually minimal, but if needed you may take acetaminophen (Tylenol) every four hours as needed. Applying an ice pack over the dressing for 15-20 minutes every 2-3 hours will relieve swelling, lessen pain, and help minimize bruising. NEVER APPLY ICE DIRECTLY TO THE SKIN. Avoid ibuprofen (Advil, Motrin) and naproxen (Aleve) for the first 48 hours as these can increase bleeding.\\n\\nSWELLING AND BRUISING:\\nSwelling and bruising are common and temporary, usually lasting 1 - 2 weeks. It is more common in areas treated around the eyes, hands, and feet. In the days following the procedure, swelling and bruising can be minimized by keeping the affected areas elevated when possible, reducing salty foods, and applying ice packs over the dressing for 15-20 minutes every 2-3 hours. NEVER APPLY ICE DIRECTLY TO THE SKIN.\\n\\nITCHING:\\nItchiness on and around the wound is very common and can last several days to weeks after surgery. Mild itch is normal as the wound is healing. \\n\\nNERVE CHANGES:\\nNumbness is usually temporary, but it may last for several weeks to months. You may also experience sharp pains at the wound sight as it heals.  Mild pain is normal and will gradually improve with time.\\n \\nNO SMOKING:\\nSmoking will delay the healing process. If you smoke, we recommend trying to quit or at minimum reduce how much you smoke following a procedure.
Treatment Number (Will Not Render If 0): 0
Detail Level: Detailed
Render Post-Care Instructions In Note?: yes
Consent: - Verbal and written consent was obtained, and risks were reviewed prior to procedure today. \\n- Risks discussed include but are not limited to scarring, hypo or hyper-pigmentation, infection, bleeding, incomplete drainage, recurrence, nerve damage, and pain. \\n- Prior to the procedure, the treatment site(s) was clearly identified and confirmed by the patient. \\n- All components of Universal Protocol/PAUSE Rule completed.
Medical Necessity Information: It is in your best interest to select a reason for this procedure from the list below. All of these items fulfill various CMS LCD requirements except the new and changing color options.
Add 52 Modifier (Optional): no

## 2020-01-07 NOTE — PROCEDURE: ADDITIONAL NOTES
Detail Level: Zone
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 \\n- If after multiple attempts for treatment it continues to recur then referral to an orthopedic hand surgeon may become necessary.  \\n- Patient expressed understanding.

## 2020-02-03 ENCOUNTER — OFFICE VISIT (OUTPATIENT)
Dept: OTOLARYNGOLOGY | Facility: CLINIC | Age: 75
End: 2020-02-03
Payer: COMMERCIAL

## 2020-02-03 VITALS
RESPIRATION RATE: 12 BRPM | SYSTOLIC BLOOD PRESSURE: 120 MMHG | HEART RATE: 95 BPM | DIASTOLIC BLOOD PRESSURE: 65 MMHG | WEIGHT: 138 LBS | OXYGEN SATURATION: 98 % | BODY MASS INDEX: 24.45 KG/M2 | HEIGHT: 63 IN

## 2020-02-03 DIAGNOSIS — H61.23 BILATERAL IMPACTED CERUMEN: Primary | ICD-10-CM

## 2020-02-03 DIAGNOSIS — H93.8X3 SENSATION OF FULLNESS IN BOTH EARS: ICD-10-CM

## 2020-02-03 PROCEDURE — 99207 ZZC NO CHARGE LOS: CPT | Performed by: OTOLARYNGOLOGY

## 2020-02-03 PROCEDURE — 69210 REMOVE IMPACTED EAR WAX UNI: CPT | Performed by: OTOLARYNGOLOGY

## 2020-02-03 ASSESSMENT — MIFFLIN-ST. JEOR: SCORE: 1095.09

## 2020-02-03 NOTE — PATIENT INSTRUCTIONS
Scheduling Information  To schedule your CT/MRI scan, please contact [facility] at [phone] OR [facility] at [phone]    To schedule your Surgery, please contact our Specialty Schedulers at [phone]      ENT Clinic Locations Clinic Hours Telephone Number     [facility]  [address] [place] [address]   Monday:           1:00pm -- 5:00pm    Friday:              8:00am - 12:00pm   To schedule/reschedule an appointment with   Dr. Moore,   please contact our   Specialty Scheduling Department at:     [phone]       [facility]  [address] [place]  [place] [address] Tuesday:          8:00am -- 2:00pm         Urgent Care Locations Clinic Hours Telephone Numbers     [facility] Cordova  [address] [place] [address]     Monday-Friday:     11:00am - 9:00pm    Saturday-Sunday:  9:00am - 5:00pm   [phone]     [facility]  [address] [place] [address]     Monday-Friday:      5:00pm - 9:00pm     Saturday-Sunday:  9:00am - 5:00pm   [phone]

## 2020-02-03 NOTE — LETTER
2/3/2020         RE: Kathya Breaux  [address]  [facility] [address]        Dear Colleague,    Thank you for referring your patient, Kathya Breaux, to the [facility]. Please see a copy of my visit note below.    History of Present Illness - Kathya Breaux is a 74 year old female last seen on 5/13/2019, for procedural removal of severe cerumen impaction.  She is here for one year follow up and ear check.    She reports that her ears have progressively felt stuffy again, especially over the last month. However, no ear pain, no blood or purulence from the ears.  She denies any vertigo or headache, but the hearing is stuffy.    Past Medical History -   Patient Active Problem List   Diagnosis     Personal history of DVT (deep vein thrombosis)     Factor V Leiden Heterozygote     CARDIOVASCULAR SCREENING; LDL GOAL LESS THAN 130     Long-term (current) use of anticoagulants [Z79.01]     Posterior vitreous detachment, bilateral     Hx of LASIK     Crohn's disease of both small and large intestine with other complication (H)     Age-related osteoporosis without current pathological fracture       Current Medications -   Current Outpatient Medications:      alendronate (FOSAMAX) 70 MG tablet, Take 1 tablet (70 mg) by mouth with 8oz water every 7 days 30 minutes before breakfast and remain upright during this time., Disp: 12 tablet, Rfl: 3     CALCIUM 500 + D 500-200 MG-IU OR TABS, 1 TABLET DAILY, Disp: , Rfl: 0     denosumab (PROLIA) 60 MG/ML SOSY injection, Inject 1 mL (60 mg) Subcutaneous every 6 months, Disp: 1 mL, Rfl: 1     IMURAN 50 MG OR TABS, 1 TABLET DAILY, Disp: , Rfl:      MULTI-VITAMIN OR TABS, 1 TABLET DAILY, Disp: , Rfl: 0     OMEGA 3-6-9 FATTY ACIDS OR, 1 TABLET DAILY, Disp: , Rfl:      OMEPRAZOLE 20 MG OR CPDR, 1 CAPSULE DAILY, Disp: , Rfl:      rivaroxaban ANTICOAGULANT (XARELTO) 20 MG TABS tablet, Take 1 tablet (20 mg) by mouth daily (with dinner), Disp: 30  tablet, Rfl: 11     VITAMIN B-12 1000 MCG/ML IJ SOLN, 1000 MCG Monthly, Disp: , Rfl: 0     VITAMIN D, CHOLECALCIFEROL, PO, Take 50 Units by mouth daily, Disp: , Rfl:      VITAMIN-B COMPLEX OR TABS, 1 tablet daily, Disp: , Rfl:     Current Facility-Administered Medications:      denosumab (PROLIA) injection 60 mg, 60 mg, Subcutaneous, Q6 Months, Phyllis Espinosa APRN CNP    Allergies -   Allergies   Allergen Reactions     Penicillins Hives     Throat closed       Social History -   Social History     Social History     Marital status:      Spouse name: N/A     Number of children: N/A     Years of education: N/A     Social History Main Topics     Smoking status: Former Smoker     Packs/day: 0.50     Years: 38.00     Quit date: 3/1/2008     Smokeless tobacco: Never Used      Comment: 6 cigs per day     Alcohol use No     Drug use: No     Sexual activity: Yes     Partners: Male     Other Topics Concern     Not on file     Social History Narrative    Caffeine intake/servings daily - 2-3    Calcium intake/servings daily - 1-2+ plus 1000mg calcium supp    Exercise 0 times weekly - describe    Sunscreen used - Yes    Seatbelts used - Yes    Guns stored in the home - No    Self Breast Exam - Yes    Pap test up to date -  Yes     Eye exam up to date -  Yes    Dental exam up to date -  Yes    DEXA scan up to date -  2005    Flex Sig/Colonoscopy up to date -  Yes 2006    Mammography up to date -  Yes 4/2006    Immunizations reviewed and up to date - Yes 1998    Abuse: Current or Past (Physical, Sexual or Emotional) - No    Do you feel safe in your environment - Yes    Do you cope well with stress - Yes    Do you suffer from insomnia - No    Last updated by: Char Strauss 3/8/2007       Family History -   Family History   Problem Relation Age of Onset     Heart Disease Father         dysrythmia's     Glaucoma Father      Heart Disease Son         clogged arteries     Cardiovascular Son         sudden heart attack      Circulatory Mother         phelbities     Genetic Disorder Mother         factor V leiden     Unknown/Adopted Maternal Grandmother      Gynecology Maternal Grandfather      Cerebrovascular Disease Paternal Grandmother      Cerebrovascular Disease Paternal Grandfather      Alcohol/Drug Brother         alcohol and drugs     Alcohol/Drug Sister         alcohol and drugs     Breast Cancer Cousin      Colon Cancer Other      Anxiety Disorder Sister      Substance Abuse Sister      Anxiety Disorder Brother      Substance Abuse Brother        Review of Systems - As per HPI and PMHx, otherwise 10+ system review of the head and neck, and general constitution is negative.    Physical Exam  There were no vitals taken for this visit.    General - The patient is well nourished and well developed, and appears to have good nutritional status.  Alert and oriented to person and place, answers questions and cooperates with examination appropriately.   Head and Face - Normocephalic and atraumatic, with no gross asymmetry noted of the contour of the facial features.  The facial nerve is intact, with strong symmetric movements.  Voice and Breathing - The patient was breathing comfortably without the use of accessory muscles. There was no wheezing, stridor, or stertor.  The patients voice was clear and strong, and had appropriate pitch and quality.  Eyes - Extraocular movements intact, and the pupils were reactive to light.  Sclera were not icteric or injected, conjunctiva were pink and moist.  Mouth - Examination of the oral cavity showed pink, healthy oral mucosa. No lesions or ulcerations noted.  The tongue was mobile and midline, and the dentition were in good condition.    Throat - The walls of the oropharynx were smooth, pink, moist, symmetric, and had no lesions or ulcerations.  The tonsillar pillars and soft palate were symmetric.  The uvula was midline on elevation.    Neck - Normal midline excursion of the laryngotracheal  complex during swallowing.  Full range of motion on passive movement.  Palpation of the occipital, submental, submandibular, internal jugular chain, and supraclavicular nodes did not demonstrate any abnormal lymph nodes or masses.  The carotid pulse was palpable bilaterally.  Palpation of the thyroid was soft and smooth, with no nodules or goiter appreciated.  The trachea was mobile and midline.  Nose - External contour is symmetric, no gross deflection or scars.  Nasal mucosa is pink and moist with no abnormal mucus.  The septum was midline and non-obstructive, turbinates of normal size and position.  No polyps, masses, or purulence noted on examination.    Cerumen Removal    Physical Exam and Procedure  Ears - On examination of the ears, I found that the BOTH ears were completely impacted with dense cerumen.  Therefore, I positioned them in the examination chair in a semi-supine position, beginning with the right side.  I used the binocular surgical microscope to perform cerumen removal.  I began by using a cerumen loop to gently lift the edges of the cerumen mass away from the walls of the external canal.  Once I did this, I was able to suction away fragments of wax and debris using suction.  Once the mass was loose enough, the entire plug was pulled from the canal.  The tympanic membrane was intact, no sign of perforation or middle ear effusion.    I turned my attention to the contralateral side once again using the binocular surgical microscope to perform cerumen removal.  I began by using a cerumen loop to gently lift the edges of the cerumen mass away from the walls of the external canal.  Once I did this, I was able to suction away fragments of wax and debris using suction.  Once the mass was loose enough, the entire plug was pulled from the canal.  The tympanic membrane was intact, no sign of perforation or middle ear effusion.      A/P - Kathya Breaux is a 74 year old female  (H61.23) Bilateral  impacted cerumen  (primary encounter diagnosis)  (H93.8X3) Sensation of fullness in both ears    The patient has had their cerumen procedurally removed today.  I have discussed ear care at home, including avoiding qtips or any other object placed in the canal.  I have also discussed that over the counter cerumen kits like Debrox or Cerumenex can be useful.    If no other issues, follow up with me in one year for an ear check.        Again, thank you for allowing me to participate in the care of your patient.        Sincerely,        Oscar Moore MD

## 2020-02-03 NOTE — PROGRESS NOTES
History of Present Illness - Kathya Breaux is a 74 year old female last seen on 5/13/2019, for procedural removal of severe cerumen impaction.  She is here for one year follow up and ear check.    She reports that her ears have progressively felt stuffy again, especially over the last month. However, no ear pain, no blood or purulence from the ears.  She denies any vertigo or headache, but the hearing is stuffy.    Past Medical History -   Patient Active Problem List   Diagnosis     Personal history of DVT (deep vein thrombosis)     Factor V Leiden Heterozygote     CARDIOVASCULAR SCREENING; LDL GOAL LESS THAN 130     Long-term (current) use of anticoagulants [Z79.01]     Posterior vitreous detachment, bilateral     Hx of LASIK     Crohn's disease of both small and large intestine with other complication (H)     Age-related osteoporosis without current pathological fracture       Current Medications -   Current Outpatient Medications:      alendronate (FOSAMAX) 70 MG tablet, Take 1 tablet (70 mg) by mouth with 8oz water every 7 days 30 minutes before breakfast and remain upright during this time., Disp: 12 tablet, Rfl: 3     CALCIUM 500 + D 500-200 MG-IU OR TABS, 1 TABLET DAILY, Disp: , Rfl: 0     denosumab (PROLIA) 60 MG/ML SOSY injection, Inject 1 mL (60 mg) Subcutaneous every 6 months, Disp: 1 mL, Rfl: 1     IMURAN 50 MG OR TABS, 1 TABLET DAILY, Disp: , Rfl:      MULTI-VITAMIN OR TABS, 1 TABLET DAILY, Disp: , Rfl: 0     OMEGA 3-6-9 FATTY ACIDS OR, 1 TABLET DAILY, Disp: , Rfl:      OMEPRAZOLE 20 MG OR CPDR, 1 CAPSULE DAILY, Disp: , Rfl:      rivaroxaban ANTICOAGULANT (XARELTO) 20 MG TABS tablet, Take 1 tablet (20 mg) by mouth daily (with dinner), Disp: 30 tablet, Rfl: 11     VITAMIN B-12 1000 MCG/ML IJ SOLN, 1000 MCG Monthly, Disp: , Rfl: 0     VITAMIN D, CHOLECALCIFEROL, PO, Take 50 Units by mouth daily, Disp: , Rfl:      VITAMIN-B COMPLEX OR TABS, 1 tablet daily, Disp: , Rfl:     Current Facility-Administered  Medications:      denosumab (PROLIA) injection 60 mg, 60 mg, Subcutaneous, Q6 Months, Phyllis Espinosa APRN CNP    Allergies -   Allergies   Allergen Reactions     Penicillins Hives     Throat closed       Social History -   Social History     Social History     Marital status:      Spouse name: N/A     Number of children: N/A     Years of education: N/A     Social History Main Topics     Smoking status: Former Smoker     Packs/day: 0.50     Years: 38.00     Quit date: 3/1/2008     Smokeless tobacco: Never Used      Comment: 6 cigs per day     Alcohol use No     Drug use: No     Sexual activity: Yes     Partners: Male     Other Topics Concern     Not on file     Social History Narrative    Caffeine intake/servings daily - 2-3    Calcium intake/servings daily - 1-2+ plus 1000mg calcium supp    Exercise 0 times weekly - describe    Sunscreen used - Yes    Seatbelts used - Yes    Guns stored in the home - No    Self Breast Exam - Yes    Pap test up to date -  Yes     Eye exam up to date -  Yes    Dental exam up to date -  Yes    DEXA scan up to date -  2005    Flex Sig/Colonoscopy up to date -  Yes 2006    Mammography up to date -  Yes 4/2006    Immunizations reviewed and up to date - Yes 1998    Abuse: Current or Past (Physical, Sexual or Emotional) - No    Do you feel safe in your environment - Yes    Do you cope well with stress - Yes    Do you suffer from insomnia - No    Last updated by: Char Strauss 3/8/2007       Family History -   Family History   Problem Relation Age of Onset     Heart Disease Father         dysrythmia's     Glaucoma Father      Heart Disease Son         clogged arteries     Cardiovascular Son         sudden heart attack     Circulatory Mother         phelbities     Genetic Disorder Mother         factor V leiden     Unknown/Adopted Maternal Grandmother      Gynecology Maternal Grandfather      Cerebrovascular Disease Paternal Grandmother      Cerebrovascular Disease Paternal  Grandfather      Alcohol/Drug Brother         alcohol and drugs     Alcohol/Drug Sister         alcohol and drugs     Breast Cancer Cousin      Colon Cancer Other      Anxiety Disorder Sister      Substance Abuse Sister      Anxiety Disorder Brother      Substance Abuse Brother        Review of Systems - As per HPI and PMHx, otherwise 10+ system review of the head and neck, and general constitution is negative.    Physical Exam  There were no vitals taken for this visit.    General - The patient is well nourished and well developed, and appears to have good nutritional status.  Alert and oriented to person and place, answers questions and cooperates with examination appropriately.   Head and Face - Normocephalic and atraumatic, with no gross asymmetry noted of the contour of the facial features.  The facial nerve is intact, with strong symmetric movements.  Voice and Breathing - The patient was breathing comfortably without the use of accessory muscles. There was no wheezing, stridor, or stertor.  The patients voice was clear and strong, and had appropriate pitch and quality.  Eyes - Extraocular movements intact, and the pupils were reactive to light.  Sclera were not icteric or injected, conjunctiva were pink and moist.  Mouth - Examination of the oral cavity showed pink, healthy oral mucosa. No lesions or ulcerations noted.  The tongue was mobile and midline, and the dentition were in good condition.    Throat - The walls of the oropharynx were smooth, pink, moist, symmetric, and had no lesions or ulcerations.  The tonsillar pillars and soft palate were symmetric.  The uvula was midline on elevation.    Neck - Normal midline excursion of the laryngotracheal complex during swallowing.  Full range of motion on passive movement.  Palpation of the occipital, submental, submandibular, internal jugular chain, and supraclavicular nodes did not demonstrate any abnormal lymph nodes or masses.  The carotid pulse was  palpable bilaterally.  Palpation of the thyroid was soft and smooth, with no nodules or goiter appreciated.  The trachea was mobile and midline.  Nose - External contour is symmetric, no gross deflection or scars.  Nasal mucosa is pink and moist with no abnormal mucus.  The septum was midline and non-obstructive, turbinates of normal size and position.  No polyps, masses, or purulence noted on examination.    Cerumen Removal    Physical Exam and Procedure  Ears - On examination of the ears, I found that the BOTH ears were completely impacted with dense cerumen.  Therefore, I positioned them in the examination chair in a semi-supine position, beginning with the right side.  I used the binocular surgical microscope to perform cerumen removal.  I began by using a cerumen loop to gently lift the edges of the cerumen mass away from the walls of the external canal.  Once I did this, I was able to suction away fragments of wax and debris using suction.  Once the mass was loose enough, the entire plug was pulled from the canal.  The tympanic membrane was intact, no sign of perforation or middle ear effusion.    I turned my attention to the contralateral side once again using the binocular surgical microscope to perform cerumen removal.  I began by using a cerumen loop to gently lift the edges of the cerumen mass away from the walls of the external canal.  Once I did this, I was able to suction away fragments of wax and debris using suction.  Once the mass was loose enough, the entire plug was pulled from the canal.  The tympanic membrane was intact, no sign of perforation or middle ear effusion.      A/P - Kathya Breaux is a 74 year old female  (H61.23) Bilateral impacted cerumen  (primary encounter diagnosis)  (H93.8X3) Sensation of fullness in both ears    The patient has had their cerumen procedurally removed today.  I have discussed ear care at home, including avoiding qtips or any other object placed in the canal.   I have also discussed that over the counter cerumen kits like Debrox or Cerumenex can be useful.    If no other issues, follow up with me in one year for an ear check.

## 2020-02-20 NOTE — PROGRESS NOTES
"Subjective     Kathya Breaux is a 74 year old female who presents to clinic today for the following health issues:    HPI   Musculoskeletal problem/pain      Duration:  End of December     Description  Location: right leg     Intensity:  moderate    Accompanying signs and symptoms: achy     History  Previous similar problem: no   Previous evaluation:  none    Precipitating or alleviating factors:  Trauma or overuse: YES- slipped on ice   Aggravating factors include: standing and walking    Therapies tried and outcome: nothing    Patient notes that she slipped on ice 2 months ago and has noted lingering pain to her lateral thigh and right lateral ankle since then.  She is on xarelto.  She denies extensive ecchymosis or swelling.  She notes that right lower leg is chronically larger than left due to previous DVT's.  This is unchanged per patient.  She has been able to bear weight, but notes that her leg has a general dull ache that makes it difficult to exercise or walk a long time.        Patient Active Problem List   Diagnosis     Personal history of DVT (deep vein thrombosis)     Factor V Leiden Heterozygote     CARDIOVASCULAR SCREENING; LDL GOAL LESS THAN 130     Long-term (current) use of anticoagulants [Z79.01]     Posterior vitreous detachment, bilateral     Hx of LASIK     Crohn's disease of both small and large intestine with other complication (H)     Age-related osteoporosis without current pathological fracture     Past Surgical History:   Procedure Laterality Date     ABDOMEN SURGERY  3 events    Bowel resec. in 1964, bowel resec in 1969     APPENDECTOMY      first bowel surgery     C NONSPECIFIC PROCEDURE  1964    3 1/2\" sm. intestine removed-illeitis     C NONSPECIFIC PROCEDURE  1969    4\" sm. intestine removed-illeitis     C NONSPECIFIC PROCEDURE  1980    Hysterectomy     COLONOSCOPY  5/2016    Q 5 years      ENHANCE LASER REFRACTIVE BILATERAL EXISTING PT IN PARAMETERS       EYE SURGERY  2001    " lasik surgery     GYN SURGERY  1980    hysterectomy       Social History     Tobacco Use     Smoking status: Former Smoker     Packs/day: 0.50     Years: 38.00     Pack years: 19.00     Types: Cigarettes     Last attempt to quit: 3/1/2008     Years since quittin.9     Smokeless tobacco: Never Used     Tobacco comment: 6 cigs per day   Substance Use Topics     Alcohol use: No     Family History   Problem Relation Age of Onset     Heart Disease Father         dysrythmia's     Glaucoma Father      Heart Disease Son         clogged arteries     Cardiovascular Son         sudden heart attack     Circulatory Mother         phelbities     Genetic Disorder Mother         factor V leiden     Unknown/Adopted Maternal Grandmother      Gynecology Maternal Grandfather      Cerebrovascular Disease Paternal Grandmother      Cerebrovascular Disease Paternal Grandfather      Alcohol/Drug Brother         alcohol and drugs     Alcohol/Drug Sister         alcohol and drugs     Breast Cancer Cousin      Colon Cancer Other      Anxiety Disorder Sister      Substance Abuse Sister      Anxiety Disorder Brother      Substance Abuse Brother          Current Outpatient Medications   Medication Sig Dispense Refill     CALCIUM 500 + D 500-200 MG-IU OR TABS 1 TABLET DAILY  0     denosumab (PROLIA) 60 MG/ML SOSY injection Inject 1 mL (60 mg) Subcutaneous every 6 months 1 mL 1     IMURAN 50 MG OR TABS 1 TABLET DAILY       MULTI-VITAMIN OR TABS 1 TABLET DAILY  0     OMEGA 3-6-9 FATTY ACIDS OR 1 TABLET DAILY       OMEPRAZOLE 20 MG OR CPDR 1 CAPSULE DAILY       rivaroxaban ANTICOAGULANT (XARELTO) 20 MG TABS tablet Take 1 tablet (20 mg) by mouth daily (with dinner) 30 tablet 11     VITAMIN B-12 1000 MCG/ML IJ SOLN 1000 MCG Monthly  0     VITAMIN-B COMPLEX OR TABS 1 tablet daily       Allergies   Allergen Reactions     Penicillins Hives     Throat closed     BP Readings from Last 3 Encounters:   20 114/70   20 120/65   19 134/74  "   Wt Readings from Last 3 Encounters:   02/21/20 62 kg (136 lb 9.6 oz)   02/03/20 62.6 kg (138 lb)   11/01/19 62.6 kg (138 lb)                      Reviewed and updated as needed this visit by Provider  Tobacco  Allergies  Meds  Problems  Med Hx  Surg Hx  Fam Hx         Review of Systems   ROS COMP: Constitutional, HEENT, cardiovascular, pulmonary, gi and gu systems are negative, except as otherwise noted.      Objective    /70   Pulse 95   Temp 98.3  F (36.8  C) (Oral)   Resp 14   Ht 1.6 m (5' 3\")   Wt 62 kg (136 lb 9.6 oz)   SpO2 99%   BMI 24.20 kg/m    Body mass index is 24.2 kg/m .  Physical Exam   GENERAL: healthy, alert and no distress  MS: Right lateral thigh, non-tender to palpation; full range of motion of right hip present without pain.  Right ankle: non-tender to palpation; full range of motion with some pain present; negative Hartley test, negative anterior drawer test.    Diagnostic Test Results:  In process        Assessment & Plan     1. Age-related osteoporosis without current pathological fracture  We discussed recent recommendation for prolia.  Patient is still considering whether or not to start treatment.    2. Mixed hyperlipidemia  Patient also wished to discuss recent recommendation for statin therapy.  10 Year risk of ASCVD is high enough to warrant treatment.  Patient to consider.    3. Fall due to slipping on ice or snow, initial encounter      4. Pain of right thigh  Possible contusion or sprain.  Consider physical therapy pending results.  - XR Femur Right 2 Views; Future    5. Ankle injury, right, initial encounter  As above.   - XR Ankle Right G/E 3 Views; Future           Return in about 2 weeks (around 3/6/2020) for no improvement in symptoms..    JULIETH Goldberg [facility]      "

## 2020-02-21 ENCOUNTER — ANCILLARY PROCEDURE (OUTPATIENT)
Dept: GENERAL RADIOLOGY | Facility: CLINIC | Age: 75
End: 2020-02-21
Attending: NURSE PRACTITIONER
Payer: COMMERCIAL

## 2020-02-21 ENCOUNTER — OFFICE VISIT (OUTPATIENT)
Dept: FAMILY MEDICINE | Facility: CLINIC | Age: 75
End: 2020-02-21
Payer: COMMERCIAL

## 2020-02-21 VITALS
HEIGHT: 63 IN | OXYGEN SATURATION: 99 % | WEIGHT: 136.6 LBS | BODY MASS INDEX: 24.2 KG/M2 | RESPIRATION RATE: 14 BRPM | DIASTOLIC BLOOD PRESSURE: 70 MMHG | HEART RATE: 95 BPM | SYSTOLIC BLOOD PRESSURE: 114 MMHG | TEMPERATURE: 98.3 F

## 2020-02-21 DIAGNOSIS — M79.651 PAIN OF RIGHT THIGH: ICD-10-CM

## 2020-02-21 DIAGNOSIS — M81.0 AGE-RELATED OSTEOPOROSIS WITHOUT CURRENT PATHOLOGICAL FRACTURE: Primary | ICD-10-CM

## 2020-02-21 DIAGNOSIS — E78.2 MIXED HYPERLIPIDEMIA: ICD-10-CM
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
## 2020-02-21 DIAGNOSIS — S99.911A ANKLE INJURY, RIGHT, INITIAL ENCOUNTER: ICD-10-CM

## 2020-02-21 PROCEDURE — 73552 X-RAY EXAM OF FEMUR 2/>: CPT | Mod: RT

## 2020-02-21 PROCEDURE — 99213 OFFICE O/P EST LOW 20 MIN: CPT | Performed by: NURSE PRACTITIONER

## 2020-02-21 PROCEDURE — 73610 X-RAY EXAM OF ANKLE: CPT | Mod: RT

## 2020-02-21 ASSESSMENT — MIFFLIN-ST. JEOR: SCORE: 1088.74

## 2020-02-24 ENCOUNTER — TRANSFERRED RECORDS (OUTPATIENT)
Dept: HEALTH INFORMATION MANAGEMENT | Facility: CLINIC | Age: 75
End: 2020-02-24

## 2020-02-24 ENCOUNTER — TELEPHONE (OUTPATIENT)
Dept: INTERNAL MEDICINE | Facility: CLINIC | Age: 75
End: 2020-02-24

## 2020-02-24 DIAGNOSIS — S93.401A SPRAIN OF RIGHT ANKLE, UNSPECIFIED LIGAMENT, INITIAL ENCOUNTER: ICD-10-CM

## 2020-02-24 DIAGNOSIS — M79.651 PAIN OF RIGHT THIGH: Primary | ICD-10-CM

## 2020-02-24 LAB
ALT SERPL-CCNC: 23 U/L (ref 0–78)
AST SERPL-CCNC: 21 U/L (ref 0–37)

## 2020-02-24 NOTE — TELEPHONE ENCOUNTER
"Patient returned call to RN Hotline and left VM.     Called and spoke with patient & notified of provider's results as written. Verbalizes understanding. She is willing to try PT. Referral placed & # given to schedule.    Patient is wondering what \"small ossicles are seen projecting adjacent to medial malleolus\". Advised her that means tiny bones are seen and patient wondering what is done for those and if surgery is ever done for it? Is that what is causing pain? Also wondering if orthotics would help?    Suzanne Arzate RN  "
Called and spoke with patient & notified of provider's information as written. Verbalizes understanding & no further questions.     Suzanne Arzate RN  
Left message for patient to call RN hotline [phone].   See TE. Advised MyChart message sent.    Suzanne Arzate RN  ------    Notes recorded by Katlin Monreal APRN CNP on 2/24/2020 at 8:04 AM CST  Please call patient-    Her x-rays were normal.  Would she want to try physical therapy to see if we can improve her pain (indication right thigh pain, right ankle sprain).    Thanks,  Katlin Monreal, ZHOU Arzate, RN  
They are just degenerative changes or arthritic changes.  Surgery is not done unless it is severe, which hers is not.  I do not think she needs orthotics at this time.    Katlin Monreal, CNP    
no...

## 2020-03-03 ENCOUNTER — OFFICE VISIT (OUTPATIENT)
Dept: AUDIOLOGY | Facility: CLINIC | Age: 75
End: 2020-03-03
Payer: COMMERCIAL

## 2020-03-03 DIAGNOSIS — H90.3 SENSORINEURAL HEARING LOSS (SNHL) OF BOTH EARS: Primary | ICD-10-CM

## 2020-03-03 DIAGNOSIS — H90.3 SENSORINEURAL HEARING LOSS, BILATERAL: Primary | ICD-10-CM

## 2020-03-03 PROCEDURE — 99207 ZZC NO CHARGE LOS: CPT | Performed by: AUDIOLOGIST

## 2020-03-03 PROCEDURE — 92557 COMPREHENSIVE HEARING TEST: CPT | Performed by: AUDIOLOGIST

## 2020-03-03 PROCEDURE — 92550 TYMPANOMETRY & REFLEX THRESH: CPT | Performed by: AUDIOLOGIST

## 2020-03-03 NOTE — PROGRESS NOTES
AUDIOLOGY REPORT    SUBJECTIVE:  Kathya Breaux is a 74 year old female who was seen in the Audiology Clinic [facility] on 3/03/20 for audiologic evaluation, referred by Dr. Moore.  The patient reports a long standing hearing loss for which they have worn RITE hearing aids from [employer] for the past 10 years. The patient denies  bilateral tinnitus, bilateral otalgia, bilateral drainage, bilateral aural fullness, family history of hearing loss, history of noise exposure, and dizziness. The patient notes difficulty with communication in a variety of listening situations. Patient was unaccompanied to today's visit.     Abuse Screening:  Do you feel unsafe at home or work/school? No  Do you feel threatened by someone? No  Does anyone try to keep you from having contact with others, or doing things outside of your home? No  Physical signs of abuse present? No     OBJECTIVE:    Otoscopic exam indicates ears are clear of cerumen bilaterally     Pure Tone Thresholds assessed using standard techniques  audiometry with good  reliability from 250-8000 Hz bilaterally using insert earphones and circumaural headphones     RIGHT:  mild and moderate-severe sensorineural hearing loss    LEFT:    mild and severe sensorineural hearing loss    NOTE: change in transducers did not merit a change in thresholds     Tympanogram:    RIGHT: normal eardrum mobility    LEFT:   normal eardrum mobility    Reflexes (reported by stimulus ear): 1000 Hz  RIGHT: Ipsilateral is present at normal levels  RIGHT: Contralateral is absent at frequencies tested  LEFT:   Ipsilateral is absent at frequencies tested  LEFT:   Contralateral is present at normal levels    Speech Reception Threshold:    RIGHT: 40 dB HL    LEFT:   45 dB HL    Word Recognition Score:     RIGHT: 96% at 80 dB HL using NU-6 recorded word list.    LEFT:   96% at 85 dB HL using NU-6 recorded word list.    ASSESSMENT:   Bilateral sensorineural hearing loss      Today s  results were discussed with the patient in detail.     PLAN:  Patient was counseled regarding hearing loss and impact on communication.  Patient is a good candidate for amplification at this time.  Handout on good communication strategies, and hearing aid use was given to patient. It is recommended that the patient return for a hearing aid consultation.  Please call this clinic with questions regarding these results or recommendations.    Wagner Almanza CCC-A  Licensed Audiologist #[id number]  3/3/2020    CC: Dr. Moore

## 2020-03-09 ENCOUNTER — THERAPY VISIT (OUTPATIENT)
Dept: PHYSICAL THERAPY | Facility: CLINIC | Age: 75
End: 2020-03-09
Attending: NURSE PRACTITIONER
Payer: COMMERCIAL

## 2020-03-09 DIAGNOSIS — M79.651 PAIN OF RIGHT THIGH: ICD-10-CM

## 2020-03-09 DIAGNOSIS — S93.401A SPRAIN OF RIGHT ANKLE, UNSPECIFIED LIGAMENT, INITIAL ENCOUNTER: ICD-10-CM

## 2020-03-09 PROCEDURE — 97110 THERAPEUTIC EXERCISES: CPT | Mod: GP | Performed by: PHYSICAL THERAPIST

## 2020-03-09 PROCEDURE — 97161 PT EVAL LOW COMPLEX 20 MIN: CPT | Mod: GP | Performed by: PHYSICAL THERAPIST

## 2020-03-09 PROCEDURE — 97140 MANUAL THERAPY 1/> REGIONS: CPT | Mod: GP | Performed by: PHYSICAL THERAPIST

## 2020-03-09 NOTE — LETTER
[facility] RO[facility] PT  [address]  [address]  [place] [address]  [phone]    March 10, 2020    Re: Kathya Breaux   :   1945  MRN:  [id number]   REFERRING PHYSICIAN:   Katlin Monreal, ZHOU ROCHA PT  Date of Initial Evaluation:  3/09/2020  Visits:  Rxs Used: 1  Reason for Referral:     Pain of right thigh  Sprain of right ankle, unspecified ligament, initial encounter    EVALUATION SUMMARY    [facility] Initial Evaluation  Subjective:  The history is provided by the patient.   Patient Health History  Kathya Breaux being seen for soreness in right thigh and right ankle.   Problem began: 2019.   Problem occurred: Slipped on ice   Pain is reported as 1/10 on pain scale.  General health as reported by patient is good.  Pertinent medical history includes: osteoarthritis and osteoporosis.   Medical allergies: none.   Surgeries include:  Other. Other surgery history details: 2 bowel surgeries for Crohns and a partial hysterectomy.    Current medications:  None.    Current occupation is Retired.          Therapist Generated HPI Evaluation  Problem details: Pt reports unchangeing Rt lat hip,thigh, leg pain following a fall in 2019. She demos a very tender area of pain in her lat thigh just distal to her GTroch. .         Type of problem:  Right hip.  This is a new condition.  Condition occurred with:  A fall/slip.  Where condition occurred: at home.  Patient reports pain:  Greater trochanter, IT band and other (Rt leg ).  Pain is described as aching and stabbing and is intermittent.  Pain radiates to:  Lower leg. Pain is worse in the P.M..  Since onset symptoms are unchanged.  Associated symptoms:  Loss of motion/stiffness. Symptoms are exacerbated by standing, transfers, sitting and walking  and relieved by rest.  Special tests included:  X-ray.  Barriers include:  None as reported by patient.    Re: Kathya Breaux   :   1945                 Objective:  Hip Evaluation  HIP AROM:  AROM:    Left Hip:     Normal    Right Hip:   Normal      Hip Strength:  Hip Strength:    Left:    Normal  Right:  Normal      Hip Special Testing:    Right hip positive for the following special tests:  Piriformis and Toribio'sRight hip negative for the following special tests:  Emerald or SLR      Hip Palpation:    Right hip tenderness present at:  Greater Trachanter; IT Band; Piriformis; Abductors; Iliac Crest; Gluteus Medius and Bursa       Assessment/Plan:    Patient is a 74 year old female with right side hip complaints.    Patient has the following significant findings with corresponding treatment plan.                Diagnosis 1:  Rt ITBand syndr   Pain -  hot/cold therapy, US, manual therapy, self management and home program  Decreased ROM/flexibility - manual therapy, therapeutic exercise and home program  Decreased joint mobility - manual therapy and therapeutic exercise  Decreased proprioception - neuro re-education and therapeutic activities  Inflammation - cold therapy and US  Impaired muscle performance - neuro re-education and home program  Decreased function - therapeutic activities    Therapy Evaluation Codes:   1) History comprised of:   Personal factors that impact the plan of care:      Age, Coping style, Overall behavior pattern and Past/current experiences.    Comorbidity factors that impact the plan of care are:      Osteoarthritis and Smoking.     Medications impacting care: Bone density and Pain.  2) Examination of Body Systems comprised of:   Body structures and functions that impact the plan of care:      Hip.   Activity limitations that impact the plan of care are:      Bending, Cooking, Squatting/kneeling, Stairs, Standing and Walking.  3) Clinical presentation characteristics are:   Stable/Uncomplicated.  4) Decision-Making    Low complexity using standardized patient assessment instrument and/or measureable assessment of functional  outcome.    Cumulative Therapy Evaluation is: Low complexity.  Re: Kathya Breaux   :   1945    Previous and current functional limitations:  (See Goal Flow Sheet for this information)    Short term and Long term goals: (See Goal Flow Sheet for this information)     Communication ability:  Patient appears to be able to clearly communicate and understand verbal and written communication and follow directions correctly.  Treatment Explanation - The following has been discussed with the patient:   RX ordered/plan of care  Anticipated outcomes  Possible risks and side effects  This patient would benefit from PT intervention to resume normal activities.   Rehab potential is good.    Frequency:  1 X week, once daily  Duration:  for 6 weeks  Discharge Plan:  Achieve all LTG.  Independent in home treatment program.  Reach maximal therapeutic benefit.      Please refer to the daily flowsheet for treatment today, total treatment time and time spent performing 1:1 timed codes.         Thank you for your referral.    INQUIRIES  Therapist: Lincoln Conte PT  [facility]  [address] [address]  [place] [address]  Phone: [phone]  Fax: [phone]

## 2020-03-09 NOTE — PROGRESS NOTES
[facility] Initial Evaluation  Subjective:  The history is provided by the patient.   Patient Health History  Kathya Breaux being seen for soreness in right thigh and right ankle.     Problem began: 12/29/2019.   Problem occurred: Slipped on ice   Pain is reported as 1/10 on pain scale.  General health as reported by patient is good.  Pertinent medical history includes: osteoarthritis and osteoporosis.     Medical allergies: none.   Surgeries include:  Other. Other surgery history details: 2 bowel surgeries for Crohns and a partial hysterectomy.    Current medications:  None.    Current occupation is Retired.                     Therapist Generated HPI Evaluation  Problem details: Pt reports unchangeing Rt lat hip,thigh, leg pain following a fall in December 2019. She demos a very tender area of pain in her lat thigh just distal to her GTroch. .         Type of problem:  Right hip.    This is a new condition.  Condition occurred with:  A fall/slip.  Where condition occurred: at home.  Patient reports pain:  Greater trochanter, IT band and other (Rt leg ).  Pain is described as aching and stabbing and is intermittent.  Pain radiates to:  Lower leg. Pain is worse in the P.M..  Since onset symptoms are unchanged.  Associated symptoms:  Loss of motion/stiffness. Symptoms are exacerbated by standing, transfers, sitting and walking  and relieved by rest.  Special tests included:  X-ray.    Barriers include:  None as reported by patient.                        Objective:  System                                           Hip Evaluation  HIP AROM:  AROM:    Left Hip:     Normal    Right Hip:   Normal                    Hip Strength:  Hip Strength:    Left:    Normal  Right:  Normal                          Hip Special Testing:       Right hip positive for the following special tests:  Piriformis and Toribio'sRight hip negative for the following special tests:  Emerald or SLR    Hip Palpation:      Right  hip tenderness present at:  Greater Trachanter; IT Band; Piriformis; Abductors; Iliac Crest; Gluteus Medius and Bursa             General     ROS    Assessment/Plan:    Patient is a 74 year old female with right side hip complaints.    Patient has the following significant findings with corresponding treatment plan.                Diagnosis 1:  Rt ITBand syndr   Pain -  hot/cold therapy, US, manual therapy, self management and home program  Decreased ROM/flexibility - manual therapy, therapeutic exercise and home program  Decreased joint mobility - manual therapy and therapeutic exercise  Decreased proprioception - neuro re-education and therapeutic activities  Inflammation - cold therapy and US  Impaired muscle performance - neuro re-education and home program  Decreased function - therapeutic activities    Therapy Evaluation Codes:   1) History comprised of:   Personal factors that impact the plan of care:      Age, Coping style, Overall behavior pattern and Past/current experiences.    Comorbidity factors that impact the plan of care are:      Osteoarthritis and Smoking.     Medications impacting care: Bone density and Pain.  2) Examination of Body Systems comprised of:   Body structures and functions that impact the plan of care:      Hip.   Activity limitations that impact the plan of care are:      Bending, Cooking, Squatting/kneeling, Stairs, Standing and Walking.  3) Clinical presentation characteristics are:   Stable/Uncomplicated.  4) Decision-Making    Low complexity using standardized patient assessment instrument and/or measureable assessment of functional outcome.  Cumulative Therapy Evaluation is: Low complexity.    Previous and current functional limitations:  (See Goal Flow Sheet for this information)    Short term and Long term goals: (See Goal Flow Sheet for this information)     Communication ability:  Patient appears to be able to clearly communicate and understand verbal and written  communication and follow directions correctly.  Treatment Explanation - The following has been discussed with the patient:   RX ordered/plan of care  Anticipated outcomes  Possible risks and side effects  This patient would benefit from PT intervention to resume normal activities.   Rehab potential is good.    Frequency:  1 X week, once daily  Duration:  for 6 weeks  Discharge Plan:  Achieve all LTG.  Independent in home treatment program.  Reach maximal therapeutic benefit.      Please refer to the daily flowsheet for treatment today, total treatment time and time spent performing 1:1 timed codes.

## 2020-03-19 ENCOUNTER — TELEPHONE (OUTPATIENT)
Dept: AUDIOLOGY | Facility: CLINIC | Age: 75
End: 2020-03-19

## 2020-03-19 NOTE — TELEPHONE ENCOUNTER
I left a voicemail in regards to the upcoming appointment and offered to reschedule after March 27th if they would prefer due to the Corona virus. If they would like to reschedule the appointment they may do so by calling [phone].    -Wagner Almanza CCC-A

## 2020-03-23 ENCOUNTER — TELEPHONE (OUTPATIENT)
Dept: AUDIOLOGY | Facility: CLINIC | Age: 75
End: 2020-03-23

## 2020-03-23 NOTE — TELEPHONE ENCOUNTER
Non-essential Audiology appointment with Wagner Mendoza for Hearing aid consult on 4/20/20 is being cancelled due to Covid-19.  Informed them that they should call back to reschedule appointment 4/27/20 or after.     George Leo MA, CCC-A  [place] Licensed Audiologist #[id number]  [facility]        3/23/2020

## 2020-04-10 ENCOUNTER — TRANSFERRED RECORDS (OUTPATIENT)
Dept: HEALTH INFORMATION MANAGEMENT | Facility: CLINIC | Age: 75
End: 2020-04-10

## 2020-05-18 ENCOUNTER — TRANSFERRED RECORDS (OUTPATIENT)
Dept: HEALTH INFORMATION MANAGEMENT | Facility: CLINIC | Age: 75
End: 2020-05-18

## 2020-05-18 LAB
ALT SERPL-CCNC: 22 U/L (ref 0–78)
AST SERPL-CCNC: 24 U/L (ref 0–37)

## 2020-07-10 ENCOUNTER — OFFICE VISIT (OUTPATIENT)
Dept: AUDIOLOGY | Facility: CLINIC | Age: 75
End: 2020-07-10
Payer: COMMERCIAL

## 2020-07-10 DIAGNOSIS — H90.3 SENSORINEURAL HEARING LOSS, BILATERAL: Primary | ICD-10-CM

## 2020-07-10 PROCEDURE — 99207 ZZC NO CHARGE LOS: CPT | Performed by: AUDIOLOGIST

## 2020-07-10 PROCEDURE — 92591 HC HEARING AID EXAM BINAURAL: CPT | Performed by: AUDIOLOGIST

## 2020-07-10 NOTE — PROGRESS NOTES
AUDIOLOGY REPORT    SUBJECTIVE: Kathya Breaux is a 74 year old female was seen in the Audiology Clinic at [facility] on 7/10/20 to discuss concerns with hearing and functional communication difficulties. Kathya has been seen previously on 3/3/2020, and results revealed a bilateral sensorineural hearing loss.  Kathya notes difficulty with communication in a variety of listening situations. Patient was unaccompanied to today's visit.       OBJECTIVE:  Patient is a hearing aid candidate. Patient would like to move forward with a hearing aid evaluation today. Therefore, the patient was presented with different options for amplification to help aid in communication. Discussed styles, levels of technology and monaural vs. binaural fitting.     The hearing aid(s) mutually chosen were:  Binaural: Oticon OPN 2 S  COLOR: 91 Silver Grey  BATTERY SIZE: 312  EARMOLD/TIPS: Small (6mm) Double Vent Domes  CANAL/ LENGTH: 1 85 dB for the left ear and 2 85 dB for the right ear   Note: We discussed that if the non-custom domes do not meet targets or help her hearing we will move to custom micromolds.     Otoscopy revealed partial obstruction with cerumen bilaterally.     ASSESSMENT:   Bilateral sensorineural hearing loss      Reviewed purchase information and warranty information with patient. The 45 day trial period was explained to patient. The patient was given a copy of the [place] [employer] consumer brochure on purchasing hearing instruments. Patient risk factors have been provided to the patient in writing prior to the sale of the hearing aid per FDA regulation. The risk factors are also available in the User Instructional Booklet to be presented on the day of the hearing aid fitting. Hearing aid(s) ordered. Hearing aid evaluation completed. Patient was advised to check with their insurance to ascertain of they are eligible for any hearing aid benefits.     PLAN: Kathya is scheduled  to return in 2-3 weeks for a hearing aid fitting and programming. Purchase agreement will be completed on that date. Please contact this clinic with any questions or concerns. Prior to the hearing aid fitting Kathya will see her ENT for bilateral cerumen removal.       Wagner Almanza CCC-A  Licensed Audiologist #[id number]  7/10/2020    CC: Dr. Moore

## 2020-07-20 ENCOUNTER — OFFICE VISIT (OUTPATIENT)
Dept: OTOLARYNGOLOGY | Facility: CLINIC | Age: 75
End: 2020-07-20
Payer: COMMERCIAL

## 2020-07-20 VITALS — SYSTOLIC BLOOD PRESSURE: 125 MMHG | DIASTOLIC BLOOD PRESSURE: 73 MMHG | HEART RATE: 93 BPM | OXYGEN SATURATION: 96 %

## 2020-07-20 DIAGNOSIS — H61.23 BILATERAL IMPACTED CERUMEN: Primary | ICD-10-CM

## 2020-07-20 DIAGNOSIS — H93.8X3 SENSATION OF FULLNESS IN BOTH EARS: ICD-10-CM

## 2020-07-20 PROCEDURE — 69210 REMOVE IMPACTED EAR WAX UNI: CPT | Mod: 50 | Performed by: OTOLARYNGOLOGY

## 2020-07-20 NOTE — PROGRESS NOTES
History of Present Illness - Kathya Breaux is a 74 year old female last seen on 2/3/2020, for procedural removal of severe cerumen impaction.  She is here for 6 month follow up and ear check.    She reports that her ears have progressively felt stuffy again, especially over the last month. However, no ear pain, no blood or purulence from the ears.  She denies any vertigo or headache, but the hearing is stuffy.    Past Medical History -   Patient Active Problem List   Diagnosis     Personal history of DVT (deep vein thrombosis)     Factor V Leiden Heterozygote     CARDIOVASCULAR SCREENING; LDL GOAL LESS THAN 130     Long-term (current) use of anticoagulants [Z79.01]     Posterior vitreous detachment, bilateral     Hx of LASIK     Crohn's disease of both small and large intestine with other complication (H)     Age-related osteoporosis without current pathological fracture       Current Medications -   Current Outpatient Medications:      CALCIUM 500 + D 500-200 MG-IU OR TABS, 1 TABLET DAILY, Disp: , Rfl: 0     denosumab (PROLIA) 60 MG/ML SOSY injection, Inject 1 mL (60 mg) Subcutaneous every 6 months, Disp: 1 mL, Rfl: 1     IMURAN 50 MG OR TABS, 1 TABLET DAILY, Disp: , Rfl:      MULTI-VITAMIN OR TABS, 1 TABLET DAILY, Disp: , Rfl: 0     OMEGA 3-6-9 FATTY ACIDS OR, 1 TABLET DAILY, Disp: , Rfl:      OMEPRAZOLE 20 MG OR CPDR, 1 CAPSULE DAILY, Disp: , Rfl:      rivaroxaban ANTICOAGULANT (XARELTO) 20 MG TABS tablet, Take 1 tablet (20 mg) by mouth daily (with dinner), Disp: 30 tablet, Rfl: 11     VITAMIN B-12 1000 MCG/ML IJ SOLN, 1000 MCG Monthly, Disp: , Rfl: 0     VITAMIN-B COMPLEX OR TABS, 1 tablet daily, Disp: , Rfl:     Current Facility-Administered Medications:      denosumab (PROLIA) injection 60 mg, 60 mg, Subcutaneous, Q6 Months, Phyllis Espinosa APRN CNP    Allergies -   Allergies   Allergen Reactions     Penicillins Hives     Throat closed       Social History -   Social History     Social History      Marital status:      Spouse name: N/A     Number of children: N/A     Years of education: N/A     Social History Main Topics     Smoking status: Former Smoker     Packs/day: 0.50     Years: 38.00     Quit date: 3/1/2008     Smokeless tobacco: Never Used      Comment: 6 cigs per day     Alcohol use No     Drug use: No     Sexual activity: Yes     Partners: Male     Other Topics Concern     Not on file     Social History Narrative    Caffeine intake/servings daily - 2-3    Calcium intake/servings daily - 1-2+ plus 1000mg calcium supp    Exercise 0 times weekly - describe    Sunscreen used - Yes    Seatbelts used - Yes    Guns stored in the home - No    Self Breast Exam - Yes    Pap test up to date -  Yes     Eye exam up to date -  Yes    Dental exam up to date -  Yes    DEXA scan up to date -  2005    Flex Sig/Colonoscopy up to date -  Yes 2006    Mammography up to date -  Yes 4/2006    Immunizations reviewed and up to date - Yes 1998    Abuse: Current or Past (Physical, Sexual or Emotional) - No    Do you feel safe in your environment - Yes    Do you cope well with stress - Yes    Do you suffer from insomnia - No    Last updated by: Char Strauss 3/8/2007       Family History -   Family History   Problem Relation Age of Onset     Heart Disease Father         dysrythmia's     Glaucoma Father      Heart Disease Son         clogged arteries     Cardiovascular Son         sudden heart attack     Circulatory Mother         phelbities     Genetic Disorder Mother         factor V leiden     Unknown/Adopted Maternal Grandmother      Gynecology Maternal Grandfather      Cerebrovascular Disease Paternal Grandmother      Cerebrovascular Disease Paternal Grandfather      Alcohol/Drug Brother         alcohol and drugs     Alcohol/Drug Sister         alcohol and drugs     Breast Cancer Cousin      Colon Cancer Other      Anxiety Disorder Sister      Substance Abuse Sister      Anxiety Disorder Brother      Substance Abuse  Brother        Review of Systems - As per HPI and PMHx, otherwise 10+ system review of the head and neck, and general constitution is negative.    Physical Exam  /73   Pulse 93   SpO2 96%     General - The patient is well nourished and well developed, and appears to have good nutritional status.  Alert and oriented to person and place, answers questions and cooperates with examination appropriately.   Head and Face - Normocephalic and atraumatic, with no gross asymmetry noted of the contour of the facial features.  The facial nerve is intact, with strong symmetric movements.  Voice and Breathing - The patient was breathing comfortably without the use of accessory muscles. There was no wheezing, stridor, or stertor.  The patients voice was clear and strong, and had appropriate pitch and quality.  Eyes - Extraocular movements intact, and the pupils were reactive to light.  Sclera were not icteric or injected, conjunctiva were pink and moist.  Neck - Normal midline excursion of the laryngotracheal complex during swallowing.  Full range of motion on passive movement.  Palpation of the occipital, submental, submandibular, internal jugular chain, and supraclavicular nodes did not demonstrate any abnormal lymph nodes or masses.  The carotid pulse was palpable bilaterally.  Palpation of the thyroid was soft and smooth, with no nodules or goiter appreciated.  The trachea was mobile and midline.      Cerumen Removal    Physical Exam and Procedure  Ears - On examination of the ears, I found that the BOTH ears were completely impacted with dense cerumen.  Therefore, I positioned them in the examination chair in a semi-supine position, beginning with the right side.  I used the binocular surgical microscope to perform cerumen removal.  I began by using a cerumen loop to gently lift the edges of the cerumen mass away from the walls of the external canal.  Once I did this, I was able to suction away fragments of wax and  debris using suction.  Once the mass was loose enough, the entire plug was pulled from the canal.  The tympanic membrane was intact, no sign of perforation or middle ear effusion.    I turned my attention to the contralateral side once again using the binocular surgical microscope to perform cerumen removal.  I began by using a cerumen loop to gently lift the edges of the cerumen mass away from the walls of the external canal.  Once I did this, I was able to suction away fragments of wax and debris using suction.  Once the mass was loose enough, the entire plug was pulled from the canal.  The tympanic membrane was intact, no sign of perforation or middle ear effusion.      A/P - Kathya Breaux is a 74 year old female  (H61.23) Bilateral impacted cerumen  (primary encounter diagnosis)  (H93.8X3) Sensation of fullness in both ears    The patient has had their cerumen procedurally removed today.  I have discussed ear care at home, including avoiding qtips or any other object placed in the canal.  I have also discussed that over the counter cerumen kits like Debrox or Cerumenex can be useful.    If no other issues, follow up with me in one year for an ear check.

## 2020-07-20 NOTE — LETTER
7/20/2020         RE: Kathya Breaux  [address]  [facility] [address]        Dear Colleague,    Thank you for referring your patient, Kathya Breaux, to the [facility]. Please see a copy of my visit note below.    History of Present Illness - Kathya Breaux is a 74 year old female last seen on 2/3/2020, for procedural removal of severe cerumen impaction.  She is here for 6 month follow up and ear check.    She reports that her ears have progressively felt stuffy again, especially over the last month. However, no ear pain, no blood or purulence from the ears.  She denies any vertigo or headache, but the hearing is stuffy.    Past Medical History -   Patient Active Problem List   Diagnosis     Personal history of DVT (deep vein thrombosis)     Factor V Leiden Heterozygote     CARDIOVASCULAR SCREENING; LDL GOAL LESS THAN 130     Long-term (current) use of anticoagulants [Z79.01]     Posterior vitreous detachment, bilateral     Hx of LASIK     Crohn's disease of both small and large intestine with other complication (H)     Age-related osteoporosis without current pathological fracture       Current Medications -   Current Outpatient Medications:      CALCIUM 500 + D 500-200 MG-IU OR TABS, 1 TABLET DAILY, Disp: , Rfl: 0     denosumab (PROLIA) 60 MG/ML SOSY injection, Inject 1 mL (60 mg) Subcutaneous every 6 months, Disp: 1 mL, Rfl: 1     IMURAN 50 MG OR TABS, 1 TABLET DAILY, Disp: , Rfl:      MULTI-VITAMIN OR TABS, 1 TABLET DAILY, Disp: , Rfl: 0     OMEGA 3-6-9 FATTY ACIDS OR, 1 TABLET DAILY, Disp: , Rfl:      OMEPRAZOLE 20 MG OR CPDR, 1 CAPSULE DAILY, Disp: , Rfl:      rivaroxaban ANTICOAGULANT (XARELTO) 20 MG TABS tablet, Take 1 tablet (20 mg) by mouth daily (with dinner), Disp: 30 tablet, Rfl: 11     VITAMIN B-12 1000 MCG/ML IJ SOLN, 1000 MCG Monthly, Disp: , Rfl: 0     VITAMIN-B COMPLEX OR TABS, 1 tablet daily, Disp: , Rfl:     Current Facility-Administered Medications:       denosumab (PROLIA) injection 60 mg, 60 mg, Subcutaneous, Q6 Months, Phyllis Espinosa APRN CNP    Allergies -   Allergies   Allergen Reactions     Penicillins Hives     Throat closed       Social History -   Social History     Social History     Marital status:      Spouse name: N/A     Number of children: N/A     Years of education: N/A     Social History Main Topics     Smoking status: Former Smoker     Packs/day: 0.50     Years: 38.00     Quit date: 3/1/2008     Smokeless tobacco: Never Used      Comment: 6 cigs per day     Alcohol use No     Drug use: No     Sexual activity: Yes     Partners: Male     Other Topics Concern     Not on file     Social History Narrative    Caffeine intake/servings daily - 2-3    Calcium intake/servings daily - 1-2+ plus 1000mg calcium supp    Exercise 0 times weekly - describe    Sunscreen used - Yes    Seatbelts used - Yes    Guns stored in the home - No    Self Breast Exam - Yes    Pap test up to date -  Yes     Eye exam up to date -  Yes    Dental exam up to date -  Yes    DEXA scan up to date -  2005    Flex Sig/Colonoscopy up to date -  Yes 2006    Mammography up to date -  Yes 4/2006    Immunizations reviewed and up to date - Yes 1998    Abuse: Current or Past (Physical, Sexual or Emotional) - No    Do you feel safe in your environment - Yes    Do you cope well with stress - Yes    Do you suffer from insomnia - No    Last updated by: Char Strauss 3/8/2007       Family History -   Family History   Problem Relation Age of Onset     Heart Disease Father         dysrythmia's     Glaucoma Father      Heart Disease Son         clogged arteries     Cardiovascular Son         sudden heart attack     Circulatory Mother         phelbities     Genetic Disorder Mother         factor V leiden     Unknown/Adopted Maternal Grandmother      Gynecology Maternal Grandfather      Cerebrovascular Disease Paternal Grandmother      Cerebrovascular Disease Paternal Grandfather       Alcohol/Drug Brother         alcohol and drugs     Alcohol/Drug Sister         alcohol and drugs     Breast Cancer Cousin      Colon Cancer Other      Anxiety Disorder Sister      Substance Abuse Sister      Anxiety Disorder Brother      Substance Abuse Brother        Review of Systems - As per HPI and PMHx, otherwise 10+ system review of the head and neck, and general constitution is negative.    Physical Exam  /73   Pulse 93   SpO2 96%     General - The patient is well nourished and well developed, and appears to have good nutritional status.  Alert and oriented to person and place, answers questions and cooperates with examination appropriately.   Head and Face - Normocephalic and atraumatic, with no gross asymmetry noted of the contour of the facial features.  The facial nerve is intact, with strong symmetric movements.  Voice and Breathing - The patient was breathing comfortably without the use of accessory muscles. There was no wheezing, stridor, or stertor.  The patients voice was clear and strong, and had appropriate pitch and quality.  Eyes - Extraocular movements intact, and the pupils were reactive to light.  Sclera were not icteric or injected, conjunctiva were pink and moist.  Neck - Normal midline excursion of the laryngotracheal complex during swallowing.  Full range of motion on passive movement.  Palpation of the occipital, submental, submandibular, internal jugular chain, and supraclavicular nodes did not demonstrate any abnormal lymph nodes or masses.  The carotid pulse was palpable bilaterally.  Palpation of the thyroid was soft and smooth, with no nodules or goiter appreciated.  The trachea was mobile and midline.      Cerumen Removal    Physical Exam and Procedure  Ears - On examination of the ears, I found that the BOTH ears were completely impacted with dense cerumen.  Therefore, I positioned them in the examination chair in a semi-supine position, beginning with the right side.  I  used the binocular surgical microscope to perform cerumen removal.  I began by using a cerumen loop to gently lift the edges of the cerumen mass away from the walls of the external canal.  Once I did this, I was able to suction away fragments of wax and debris using suction.  Once the mass was loose enough, the entire plug was pulled from the canal.  The tympanic membrane was intact, no sign of perforation or middle ear effusion.    I turned my attention to the contralateral side once again using the binocular surgical microscope to perform cerumen removal.  I began by using a cerumen loop to gently lift the edges of the cerumen mass away from the walls of the external canal.  Once I did this, I was able to suction away fragments of wax and debris using suction.  Once the mass was loose enough, the entire plug was pulled from the canal.  The tympanic membrane was intact, no sign of perforation or middle ear effusion.      A/P - Kathya Breaux is a 74 year old female  (H61.23) Bilateral impacted cerumen  (primary encounter diagnosis)  (H93.8X3) Sensation of fullness in both ears    The patient has had their cerumen procedurally removed today.  I have discussed ear care at home, including avoiding qtips or any other object placed in the canal.  I have also discussed that over the counter cerumen kits like Debrox or Cerumenex can be useful.    If no other issues, follow up with me in one year for an ear check.        Again, thank you for allowing me to participate in the care of your patient.        Sincerely,        Oscar Moore MD

## 2020-07-31 ENCOUNTER — OFFICE VISIT (OUTPATIENT)
Dept: AUDIOLOGY | Facility: CLINIC | Age: 75
End: 2020-07-31
Payer: COMMERCIAL

## 2020-07-31 DIAGNOSIS — H90.3 BILATERAL SENSORINEURAL HEARING LOSS: Primary | ICD-10-CM

## 2020-07-31 PROCEDURE — V5261 HEARING AID, DIGIT, BIN, BTE: HCPCS | Performed by: AUDIOLOGIST

## 2020-07-31 PROCEDURE — V5020 CONFORMITY EVALUATION: HCPCS | Mod: LT | Performed by: AUDIOLOGIST

## 2020-07-31 PROCEDURE — V5020 CONFORMITY EVALUATION: HCPCS | Mod: RT | Performed by: AUDIOLOGIST

## 2020-07-31 PROCEDURE — V5011 HEARING AID FITTING/CHECKING: HCPCS | Mod: LT | Performed by: AUDIOLOGIST

## 2020-07-31 PROCEDURE — V5011 HEARING AID FITTING/CHECKING: HCPCS | Mod: RT | Performed by: AUDIOLOGIST

## 2020-07-31 PROCEDURE — V5160 DISPENSING FEE BINAURAL: HCPCS | Performed by: AUDIOLOGIST

## 2020-07-31 PROCEDURE — 99207 ZZC NO CHARGE LOS: CPT | Performed by: AUDIOLOGIST

## 2020-07-31 PROCEDURE — 92593 HC HEARING AID CHECK, BINAURAL: CPT | Performed by: AUDIOLOGIST

## 2020-07-31 NOTE — PATIENT INSTRUCTIONS

## 2020-07-31 NOTE — PROGRESS NOTES
AUDIOLOGY REPORT    SUBJECTIVE: Kathya Breaux, a 74 year old female, was seen in the Audiology Clinic at [facility] today for a Binaural hearing aid fitting. Previous results have revealed a bilateral sensorineural hearing loss. Patient was unaccompanied to today's visit.       OBJECTIVE:  Prior to fitting, a hearing aid check was performed to ensure device functionality. The hearing aid conformity evaluation was completed.The hearing aids were placed and they provided a good fit. Real-ear-probe-microphone measurements were completed on the [employer] system and were an acceptable match to NAL-NL2 target with soft sounds audible, moderate sounds comfortable, and loud sounds below discomfort. UCLs are verified through maximum power output measures and demonstrate appropriate limiting of loud inputs. Ms. Breaux was oriented to proper hearing aid use, care, cleaning (no water, dry brush), batteries (size 312, insertion/removal, toxicity, low-battery signal), aid insertion/removal, user booklet, warranty information, storage cases, and other hearing aid details. The patient confirmed understanding of hearing aid use and care, and showed proper insertion of hearing aid and batteries while in the office today. Ms. Breaux reported good volume and sound quality today.    EAR(S) FIT: Binaural  HEARING AID MAKE: Right: Oticon; Left: Oticon  HEARING AID MODEL #: Right: OPN S 2; Left: OPN S 2  HEARING AID STYLE: Right: RITE; Left: RITE  DOME SIZE: Right:  6 mm Double Vent; Left::  6 mm Double Vent   LENGTH: Right:  1 85dB; Left:  1 85dB  EARMOLDS: Right:  ; Left:     SERIAL NUMBERS: Right: [id number]; Left: [id number]  WARRANTY END DATE: Right: 8/23/2023; Left:: 8/23/2023      CHARGES:     Hearing Aid Check: Binaural, 47194, $81.00  Dispensing Fee: Binaural, , $500.00  Fit/Orientation: Binaural, , $370.00  Hearing Aid Conformity Evaluation: 2, , $174.00  Hearing Aid Digital: Binaural, BTE,   $4475  Total: $5600    ASSESSMENT: Binaural hearing aid fitting completed today. Verification measures were performed. The 45 day trial period was explained to patient, and they expressed understanding. Ms. Breaux signed the Hearing Aid Purchase Agreement and was given a copy, as well as details on her hearing aids. Patient was counseled that exact out of pocket amounts cannot be determined for hearing aid claims being sent to insurance. Any insurance coverage information presented to the patient is an estimate only, and is not a guarantee of payment. Patient has been advised to check with their own insurance.    PLAN: Ms. Breaux will return for follow-up in 2-3 weeks for a hearing aid review appointment. Please call this clinic with questions regarding today s appointment.    Wagner Almanza CCC-A  Licensed Audiologist #[id number]  7/31/2020

## 2020-08-14 ENCOUNTER — OFFICE VISIT (OUTPATIENT)
Dept: AUDIOLOGY | Facility: CLINIC | Age: 75
End: 2020-08-14
Payer: COMMERCIAL

## 2020-08-14 DIAGNOSIS — H90.3 SENSORINEURAL HEARING LOSS, BILATERAL: Primary | ICD-10-CM

## 2020-08-14 PROCEDURE — V5299 HEARING SERVICE: HCPCS | Performed by: AUDIOLOGIST

## 2020-08-14 PROCEDURE — 99207 ZZC NO CHARGE LOS: CPT | Performed by: AUDIOLOGIST

## 2020-08-14 NOTE — PROGRESS NOTES
AUDIOLOGY REPORT    SUBJECTIVE:Kathya Breaux is a 74 year old female who was seen in the Audiology Clinic at the [facility] on 8/14/2020  for a follow-up check regarding the fitting of new hearing aids. Previous results have revealed bilateral hearing loss.  The patient has been seen previously in this clinic and was fit with binaural hearing aids on 7/31/2020.  Kathya reports good sound quality with the hearing aid(s) and increased wear time with the heaing aids. A great decrease in noticeable feedback and an excellent reduction in background noise. Patient was unaccompanied to today's visit.     OBJECTIVE:   The International Outcome Inventory-Hearing Aids (IOI-HA) was administered today.The patient s responses to the 7 questions can be compared to normative data relative to how others are performing with their hearing aids, as well as focusing audiologic care and counseling.This patient s Quality of Life score (Question 7) was 5, which is above normative average.     Based on patient report, the following changes were made; None.    Reviewed 45 day trial period, care, cleaning (no water, dry brush), batteries (size 312) insertion/removal, toxicity, low-battery signal), aid insertion/removal, volume adjustment (if applicable), user booklet, warranty information, storage cases, and other hearing aid details.        ASSESSMENT: A follow-up appointment for hearing aid fitting was completed today. IOI-HA administered today. Changes to hearing aid was completed as outlined above.     PLAN:Kathya will return for follow-up as needed, or at least every 9-12 months for cleaning and assessment of hearing aid.   Please call this clinic with any questions regarding today s appointment.    Wagner Almanza CCC-A  Licensed Audiologist #[id number]  8/14/2020

## 2020-09-28 ENCOUNTER — TRANSFERRED RECORDS (OUTPATIENT)
Dept: HEALTH INFORMATION MANAGEMENT | Facility: CLINIC | Age: 75
End: 2020-09-28

## 2020-09-28 LAB
ALT SERPL-CCNC: 23 U/L (ref 0–78)
AST SERPL-CCNC: 21 U/L (ref 0–37)

## 2020-10-16 NOTE — PROGRESS NOTES
History of Present Illness - Kathya Breaux is a 74 year old female last seen on 7/20/2020, for procedural removal of severe cerumen impaction.  She is here for 6 month follow up and ear check.    She reports that her ears have progressively felt stuffy again, especially over the last month,  And she notes that she recently lost the tips to her hearing aids, and thinks they might be in the canals bilaterally.     Otherwise, no ear pain, no blood or purulence from the ears.  She denies any vertigo or headache, but the hearing is stuffy.    Past Medical History -   Patient Active Problem List   Diagnosis     Personal history of DVT (deep vein thrombosis)     Factor V Leiden Heterozygote     CARDIOVASCULAR SCREENING; LDL GOAL LESS THAN 130     Long-term (current) use of anticoagulants [Z79.01]     Posterior vitreous detachment, bilateral     Hx of LASIK     Crohn's disease of both small and large intestine with other complication (H)     Age-related osteoporosis without current pathological fracture       Current Medications -   Current Outpatient Medications:      CALCIUM 500 + D 500-200 MG-IU OR TABS, 1 TABLET DAILY, Disp: , Rfl: 0     denosumab (PROLIA) 60 MG/ML SOSY injection, Inject 1 mL (60 mg) Subcutaneous every 6 months, Disp: 1 mL, Rfl: 1     IMURAN 50 MG OR TABS, 1 TABLET DAILY, Disp: , Rfl:      MULTI-VITAMIN OR TABS, 1 TABLET DAILY, Disp: , Rfl: 0     OMEGA 3-6-9 FATTY ACIDS OR, 1 TABLET DAILY, Disp: , Rfl:      OMEPRAZOLE 20 MG OR CPDR, 1 CAPSULE DAILY, Disp: , Rfl:      rivaroxaban ANTICOAGULANT (XARELTO) 20 MG TABS tablet, Take 1 tablet (20 mg) by mouth daily (with dinner), Disp: 30 tablet, Rfl: 11     VITAMIN B-12 1000 MCG/ML IJ SOLN, 1000 MCG Monthly, Disp: , Rfl: 0     VITAMIN-B COMPLEX OR TABS, 1 tablet daily, Disp: , Rfl:     Current Facility-Administered Medications:      denosumab (PROLIA) injection 60 mg, 60 mg, Subcutaneous, Q6 Months, Phyllis Espinosa, JULIETH CNP    Allergies -    Allergies   Allergen Reactions     Penicillins Hives     Throat closed       Social History -   Social History     Social History     Marital status:      Spouse name: N/A     Number of children: N/A     Years of education: N/A     Social History Main Topics     Smoking status: Former Smoker     Packs/day: 0.50     Years: 38.00     Quit date: 3/1/2008     Smokeless tobacco: Never Used      Comment: 6 cigs per day     Alcohol use No     Drug use: No     Sexual activity: Yes     Partners: Male     Other Topics Concern     Not on file     Social History Narrative    Caffeine intake/servings daily - 2-3    Calcium intake/servings daily - 1-2+ plus 1000mg calcium supp    Exercise 0 times weekly - describe    Sunscreen used - Yes    Seatbelts used - Yes    Guns stored in the home - No    Self Breast Exam - Yes    Pap test up to date -  Yes     Eye exam up to date -  Yes    Dental exam up to date -  Yes    DEXA scan up to date -  2005    Flex Sig/Colonoscopy up to date -  Yes 2006    Mammography up to date -  Yes 4/2006    Immunizations reviewed and up to date - Yes 1998    Abuse: Current or Past (Physical, Sexual or Emotional) - No    Do you feel safe in your environment - Yes    Do you cope well with stress - Yes    Do you suffer from insomnia - No    Last updated by: Char Strauss 3/8/2007       Family History -   Family History   Problem Relation Age of Onset     Heart Disease Father         dysrythmia's     Glaucoma Father      Heart Disease Son         clogged arteries     Cardiovascular Son         sudden heart attack     Circulatory Mother         phelbities     Genetic Disorder Mother         factor V leiden     Unknown/Adopted Maternal Grandmother      Gynecology Maternal Grandfather      Cerebrovascular Disease Paternal Grandmother      Cerebrovascular Disease Paternal Grandfather      Alcohol/Drug Brother         alcohol and drugs     Alcohol/Drug Sister         alcohol and drugs     Breast Cancer Cousin  "     Colon Cancer Other      Anxiety Disorder Sister      Substance Abuse Sister      Anxiety Disorder Brother      Substance Abuse Brother        Review of Systems - As per HPI and PMHx, otherwise 10+ system review of the head and neck, and general constitution is negative.    Physical Exam  /73   Pulse 97   Resp 16   Ht 1.6 m (5' 3\")   Wt 61.7 kg (136 lb)   SpO2 96%   BMI 24.09 kg/m      General - The patient is well nourished and well developed, and appears to have good nutritional status.  Alert and oriented to person and place, answers questions and cooperates with examination appropriately.   Head and Face - Normocephalic and atraumatic, with no gross asymmetry noted of the contour of the facial features.  The facial nerve is intact, with strong symmetric movements.  Voice and Breathing - The patient was breathing comfortably without the use of accessory muscles. There was no wheezing, stridor, or stertor.  The patients voice was clear and strong, and had appropriate pitch and quality.  Eyes - Extraocular movements intact, and the pupils were reactive to light.  Sclera were not icteric or injected, conjunctiva were pink and moist.    Cerumen Removal    Physical Exam and Procedure  Ears - On examination of the ears, I found that the BOTH ears were completely impacted with dense cerumen and her hearing aid ear piece tips.  Therefore, I positioned them in the examination chair in a semi-supine position, beginning with the right side.  I used the binocular surgical microscope to perform cerumen removal.  I began by using a cerumen loop to gently lift the edges of the cerumen mass away from the walls of the external canal.  Once I did this, I was able to suction away fragments of wax and debris using suction.  Once the mass was loose enough, the entire plug was pulled from the canal.  The tympanic membrane was intact, no sign of perforation or middle ear effusion.    I turned my attention to the " contralateral side once again using the binocular surgical microscope to perform cerumen removal and ear piece tip.  I began by using a cerumen loop to gently lift the edges of the cerumen mass away from the walls of the external canal.  Once I did this, I was able to suction away fragments of wax and debris using suction.  Once the mass was loose enough, the entire plug was pulled from the canal.  The tympanic membrane was intact, no sign of perforation or middle ear effusion.      A/P - Kathya Breaux is a 74 year old female  (H61.23) Bilateral impacted cerumen  (primary encounter diagnosis)  (H93.8X3) Sensation of fullness in both ears    The patient has had their cerumen and ear piece tips procedurally removed today.  I have discussed ear care at home, including avoiding qtips or any other object placed in the canal.  I have also discussed that over the counter cerumen kits like Debrox or Cerumenex can be useful.    If no other issues, follow up with me in one year for an ear check.

## 2020-10-19 ENCOUNTER — OFFICE VISIT (OUTPATIENT)
Dept: OTOLARYNGOLOGY | Facility: CLINIC | Age: 75
End: 2020-10-19
Payer: COMMERCIAL

## 2020-10-19 VITALS
RESPIRATION RATE: 16 BRPM | HEART RATE: 97 BPM | DIASTOLIC BLOOD PRESSURE: 73 MMHG | HEIGHT: 63 IN | BODY MASS INDEX: 24.1 KG/M2 | SYSTOLIC BLOOD PRESSURE: 134 MMHG | WEIGHT: 136 LBS | OXYGEN SATURATION: 96 %

## 2020-10-19 DIAGNOSIS — H90.3 SENSORINEURAL HEARING LOSS (SNHL) OF BOTH EARS: ICD-10-CM

## 2020-10-19 DIAGNOSIS — H61.23 BILATERAL IMPACTED CERUMEN: Primary | ICD-10-CM

## 2020-10-19 PROCEDURE — 99213 OFFICE O/P EST LOW 20 MIN: CPT | Mod: 25 | Performed by: OTOLARYNGOLOGY

## 2020-10-19 PROCEDURE — 69210 REMOVE IMPACTED EAR WAX UNI: CPT | Mod: 50 | Performed by: OTOLARYNGOLOGY

## 2020-10-19 ASSESSMENT — MIFFLIN-ST. JEOR: SCORE: 1086.02

## 2020-10-19 ASSESSMENT — PAIN SCALES - GENERAL: PAINLEVEL: NO PAIN (0)

## 2020-10-19 NOTE — LETTER
10/19/2020         RE: Kathya Breaux  [address]  [facility] [address]        Dear Colleague,    Thank you for referring your patient, Kathya Breaux, to the [facility]. Please see a copy of my visit note below.    History of Present Illness - Kathya Breaux is a 74 year old female last seen on 7/20/2020, for procedural removal of severe cerumen impaction.  She is here for 6 month follow up and ear check.    She reports that her ears have progressively felt stuffy again, especially over the last month,  And she notes that she recently lost the tips to her hearing aids, and thinks they might be in the canals bilaterally.     Otherwise, no ear pain, no blood or purulence from the ears.  She denies any vertigo or headache, but the hearing is stuffy.    Past Medical History -   Patient Active Problem List   Diagnosis     Personal history of DVT (deep vein thrombosis)     Factor V Leiden Heterozygote     CARDIOVASCULAR SCREENING; LDL GOAL LESS THAN 130     Long-term (current) use of anticoagulants [Z79.01]     Posterior vitreous detachment, bilateral     Hx of LASIK     Crohn's disease of both small and large intestine with other complication (H)     Age-related osteoporosis without current pathological fracture       Current Medications -   Current Outpatient Medications:      CALCIUM 500 + D 500-200 MG-IU OR TABS, 1 TABLET DAILY, Disp: , Rfl: 0     denosumab (PROLIA) 60 MG/ML SOSY injection, Inject 1 mL (60 mg) Subcutaneous every 6 months, Disp: 1 mL, Rfl: 1     IMURAN 50 MG OR TABS, 1 TABLET DAILY, Disp: , Rfl:      MULTI-VITAMIN OR TABS, 1 TABLET DAILY, Disp: , Rfl: 0     OMEGA 3-6-9 FATTY ACIDS OR, 1 TABLET DAILY, Disp: , Rfl:      OMEPRAZOLE 20 MG OR CPDR, 1 CAPSULE DAILY, Disp: , Rfl:      rivaroxaban ANTICOAGULANT (XARELTO) 20 MG TABS tablet, Take 1 tablet (20 mg) by mouth daily (with dinner), Disp: 30 tablet, Rfl: 11     VITAMIN B-12 1000 MCG/ML IJ SOLN, 1000  MCG Monthly, Disp: , Rfl: 0     VITAMIN-B COMPLEX OR TABS, 1 tablet daily, Disp: , Rfl:     Current Facility-Administered Medications:      denosumab (PROLIA) injection 60 mg, 60 mg, Subcutaneous, Q6 Months, Phyllis Espinosa APRN CNP    Allergies -   Allergies   Allergen Reactions     Penicillins Hives     Throat closed       Social History -   Social History     Social History     Marital status:      Spouse name: N/A     Number of children: N/A     Years of education: N/A     Social History Main Topics     Smoking status: Former Smoker     Packs/day: 0.50     Years: 38.00     Quit date: 3/1/2008     Smokeless tobacco: Never Used      Comment: 6 cigs per day     Alcohol use No     Drug use: No     Sexual activity: Yes     Partners: Male     Other Topics Concern     Not on file     Social History Narrative    Caffeine intake/servings daily - 2-3    Calcium intake/servings daily - 1-2+ plus 1000mg calcium supp    Exercise 0 times weekly - describe    Sunscreen used - Yes    Seatbelts used - Yes    Guns stored in the home - No    Self Breast Exam - Yes    Pap test up to date -  Yes     Eye exam up to date -  Yes    Dental exam up to date -  Yes    DEXA scan up to date -  2005    Flex Sig/Colonoscopy up to date -  Yes 2006    Mammography up to date -  Yes 4/2006    Immunizations reviewed and up to date - Yes 1998    Abuse: Current or Past (Physical, Sexual or Emotional) - No    Do you feel safe in your environment - Yes    Do you cope well with stress - Yes    Do you suffer from insomnia - No    Last updated by: Char Strauss 3/8/2007       Family History -   Family History   Problem Relation Age of Onset     Heart Disease Father         dysrythmia's     Glaucoma Father      Heart Disease Son         clogged arteries     Cardiovascular Son         sudden heart attack     Circulatory Mother         phelbities     Genetic Disorder Mother         factor V leiden     Unknown/Adopted Maternal Grandmother       "Gynecology Maternal Grandfather      Cerebrovascular Disease Paternal Grandmother      Cerebrovascular Disease Paternal Grandfather      Alcohol/Drug Brother         alcohol and drugs     Alcohol/Drug Sister         alcohol and drugs     Breast Cancer Cousin      Colon Cancer Other      Anxiety Disorder Sister      Substance Abuse Sister      Anxiety Disorder Brother      Substance Abuse Brother        Review of Systems - As per HPI and PMHx, otherwise 10+ system review of the head and neck, and general constitution is negative.    Physical Exam  /73   Pulse 97   Resp 16   Ht 1.6 m (5' 3\")   Wt 61.7 kg (136 lb)   SpO2 96%   BMI 24.09 kg/m      General - The patient is well nourished and well developed, and appears to have good nutritional status.  Alert and oriented to person and place, answers questions and cooperates with examination appropriately.   Head and Face - Normocephalic and atraumatic, with no gross asymmetry noted of the contour of the facial features.  The facial nerve is intact, with strong symmetric movements.  Voice and Breathing - The patient was breathing comfortably without the use of accessory muscles. There was no wheezing, stridor, or stertor.  The patients voice was clear and strong, and had appropriate pitch and quality.  Eyes - Extraocular movements intact, and the pupils were reactive to light.  Sclera were not icteric or injected, conjunctiva were pink and moist.    Cerumen Removal    Physical Exam and Procedure  Ears - On examination of the ears, I found that the BOTH ears were completely impacted with dense cerumen and her hearing aid ear piece tips.  Therefore, I positioned them in the examination chair in a semi-supine position, beginning with the right side.  I used the binocular surgical microscope to perform cerumen removal.  I began by using a cerumen loop to gently lift the edges of the cerumen mass away from the walls of the external canal.  Once I did this, I was " able to suction away fragments of wax and debris using suction.  Once the mass was loose enough, the entire plug was pulled from the canal.  The tympanic membrane was intact, no sign of perforation or middle ear effusion.    I turned my attention to the contralateral side once again using the binocular surgical microscope to perform cerumen removal and ear piece tip.  I began by using a cerumen loop to gently lift the edges of the cerumen mass away from the walls of the external canal.  Once I did this, I was able to suction away fragments of wax and debris using suction.  Once the mass was loose enough, the entire plug was pulled from the canal.  The tympanic membrane was intact, no sign of perforation or middle ear effusion.      A/P - Kathya Breaux is a 74 year old female  (H61.23) Bilateral impacted cerumen  (primary encounter diagnosis)  (H93.8X3) Sensation of fullness in both ears    The patient has had their cerumen and ear piece tips procedurally removed today.  I have discussed ear care at home, including avoiding qtips or any other object placed in the canal.  I have also discussed that over the counter cerumen kits like Debrox or Cerumenex can be useful.    If no other issues, follow up with me in one year for an ear check.          Again, thank you for allowing me to participate in the care of your patient.        Sincerely,        Oscar Moore MD

## 2020-10-19 NOTE — PATIENT INSTRUCTIONS
Scheduling Information  To schedule your CT/MRI scan, please contact [facility] at [phone] OR [facility] at [phone]    To schedule your Surgery, please contact our Specialty Schedulers at [phone]      ENT Clinic Locations Clinic Hours Telephone Number     [facility]  [address] [place] [address]   Monday:           1:00pm -- 5:00pm    Friday:              8:00am - 12:00pm   To schedule/reschedule an appointment with   Dr. Moore,   please contact our   Specialty Scheduling Department at:     [phone]       [facility] Cord[facility]  [address] [place] [address] Tuesday:          8:00am -- 2:00pm         Urgent Care Locations Clinic Hours Telephone Numbers     [facility]  [address] [place] [address]     Monday-Friday:     11:00am - 9:00pm    Saturday-Sunday:  9:00am - 5:00pm   [phone]     [facility]  [address] [place] [address]     Monday-Friday:      5:00pm - 9:00pm     Saturday-Sunday:  9:00am - 5:00pm   [phone]

## 2020-10-21 DIAGNOSIS — Z86.718 PERSONAL HISTORY OF DVT (DEEP VEIN THROMBOSIS): ICD-10-CM

## 2020-10-21 DIAGNOSIS — D68.51 FACTOR V LEIDEN MUTATION (H): ICD-10-CM

## 2020-10-23 ENCOUNTER — TELEPHONE (OUTPATIENT)
Dept: AUDIOLOGY | Facility: CLINIC | Age: 75
End: 2020-10-23

## 2020-10-23 DIAGNOSIS — H90.3 SENSORINEURAL HEARING LOSS, BILATERAL: Primary | ICD-10-CM

## 2020-10-23 PROCEDURE — V5267 HEARING AID SUP/ACCESS/DEV: HCPCS | Performed by: AUDIOLOGIST

## 2020-10-23 PROCEDURE — 99207 PR NO CHARGE LOS: CPT | Performed by: AUDIOLOGIST

## 2020-10-23 NOTE — TELEPHONE ENCOUNTER
Kathya is requesting 4 packs of waxguards and more 6mm double vent domes for her O[employer]on OPN hearing aids.     CHARGES:    Supplies    Wagner Almanza CCC-A  Licensed Audiologist #[id number]  10/23/2020

## 2020-11-06 ENCOUNTER — DOCUMENTATION ONLY (OUTPATIENT)
Dept: LAB | Facility: CLINIC | Age: 75
End: 2020-11-06

## 2020-11-06 DIAGNOSIS — D68.51 FACTOR V LEIDEN MUTATION (H): Primary | ICD-10-CM

## 2020-11-06 DIAGNOSIS — E61.1 IRON DEFICIENCY: ICD-10-CM

## 2020-11-09 ENCOUNTER — ONCOLOGY VISIT (OUTPATIENT)
Dept: ONCOLOGY | Facility: CLINIC | Age: 75
End: 2020-11-09
Payer: COMMERCIAL

## 2020-11-09 VITALS
TEMPERATURE: 98.7 F | HEART RATE: 89 BPM | DIASTOLIC BLOOD PRESSURE: 80 MMHG | SYSTOLIC BLOOD PRESSURE: 137 MMHG | HEIGHT: 63 IN | OXYGEN SATURATION: 95 % | WEIGHT: 140.9 LBS | BODY MASS INDEX: 24.96 KG/M2

## 2020-11-09 DIAGNOSIS — E61.1 IRON DEFICIENCY: ICD-10-CM

## 2020-11-09 DIAGNOSIS — D68.51 FACTOR V LEIDEN MUTATION (H): ICD-10-CM

## 2020-11-09 DIAGNOSIS — Z86.718 PERSONAL HISTORY OF DVT (DEEP VEIN THROMBOSIS): ICD-10-CM

## 2020-11-09 DIAGNOSIS — K50.818 CROHN'S DISEASE OF BOTH SMALL AND LARGE INTESTINE WITH OTHER COMPLICATION (H): Primary | ICD-10-CM

## 2020-11-09 LAB
BASOPHILS # BLD AUTO: 0 10E9/L (ref 0–0.2)
BASOPHILS NFR BLD AUTO: 0.5 %
DIFFERENTIAL METHOD BLD: ABNORMAL
EOSINOPHIL # BLD AUTO: 0.1 10E9/L (ref 0–0.7)
EOSINOPHIL NFR BLD AUTO: 1.2 %
ERYTHROCYTE [DISTWIDTH] IN BLOOD BY AUTOMATED COUNT: 13.6 % (ref 10–15)
FERRITIN SERPL-MCNC: 59 NG/ML (ref 8–252)
HCT VFR BLD AUTO: 38 % (ref 35–47)
HGB BLD-MCNC: 12.9 G/DL (ref 11.7–15.7)
IMM GRANULOCYTES # BLD: 0 10E9/L (ref 0–0.4)
IMM GRANULOCYTES NFR BLD: 0.3 %
IRON SATN MFR SERPL: 30 % (ref 15–46)
IRON SERPL-MCNC: 86 UG/DL (ref 35–180)
LYMPHOCYTES # BLD AUTO: 1.7 10E9/L (ref 0.8–5.3)
LYMPHOCYTES NFR BLD AUTO: 22.2 %
MCH RBC QN AUTO: 33.3 PG (ref 26.5–33)
MCHC RBC AUTO-ENTMCNC: 33.9 G/DL (ref 31.5–36.5)
MCV RBC AUTO: 98 FL (ref 78–100)
MONOCYTES # BLD AUTO: 0.5 10E9/L (ref 0–1.3)
MONOCYTES NFR BLD AUTO: 5.8 %
NEUTROPHILS # BLD AUTO: 5.4 10E9/L (ref 1.6–8.3)
NEUTROPHILS NFR BLD AUTO: 70 %
PLATELET # BLD AUTO: 242 10E9/L (ref 150–450)
RBC # BLD AUTO: 3.87 10E12/L (ref 3.8–5.2)
TIBC SERPL-MCNC: 288 UG/DL (ref 240–430)
WBC # BLD AUTO: 7.8 10E9/L (ref 4–11)

## 2020-11-09 PROCEDURE — 85025 COMPLETE CBC W/AUTO DIFF WBC: CPT | Performed by: NURSE PRACTITIONER

## 2020-11-09 PROCEDURE — 83540 ASSAY OF IRON: CPT | Performed by: NURSE PRACTITIONER

## 2020-11-09 PROCEDURE — 36415 COLL VENOUS BLD VENIPUNCTURE: CPT | Performed by: NURSE PRACTITIONER

## 2020-11-09 PROCEDURE — 82728 ASSAY OF FERRITIN: CPT | Performed by: NURSE PRACTITIONER

## 2020-11-09 PROCEDURE — 83550 IRON BINDING TEST: CPT | Performed by: NURSE PRACTITIONER

## 2020-11-09 PROCEDURE — 99213 OFFICE O/P EST LOW 20 MIN: CPT | Performed by: NURSE PRACTITIONER

## 2020-11-09 ASSESSMENT — MIFFLIN-ST. JEOR: SCORE: 1108.25

## 2020-11-09 ASSESSMENT — PAIN SCALES - GENERAL: PAINLEVEL: NO PAIN (0)

## 2020-11-09 NOTE — PROGRESS NOTES
Hematology Follow Up Visit: November 9, 2020    Oncologist: Dr Babs Scherer  PCP: Phyllis Espinosa    Diagnosis:   Kathya Breaux is a 73 yo female - noted taken and updated from Dr Guzman dated   1. Hx of RLE DVT - She was diagnosed in 11/2009 when she presented with right lower extremity pain and swelling and a  a right lower extremity ultrasound at which demonstrated a nonocclusive thrombus in the distal right popliteal vein and an occlusive thrombus extending into the peroneal and posterior tibial veins of the proximal right calf.  The common femoral veins of the right lower extremity demonstrated normal flow. Ultimately, her f/up RLE US in 03/2011 demonstrated resolution of DVT. She has been on warfarin since 11/2009 and moved to Xarelto in 11/2019.  2. History of superficial thrombophlebitis at the age of 17- acute thrombophlebitis treated with bed rest and anticoagulants in 10/1963. 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 She was on high dose steroids as well for her ileitis.    3. Factor V Leiden Heterozygote    Interval History: Ms Breaux comes to clinic for review use of use of Xarelto.Pt shares she likes using the xarelto compared to warfarin due to previous need for lab review. She is also noting easy bruising especially to hands and forearms. Has not smoked and is breathing easily with no problems with exertion. No new trauma or complaints other than she does have higher copay with the Xarelto over warfarin but is worth it with less monitoring per pt.   Rest of comprehensive and complete ROS is reviewed and is negative.   Past Medical History:   Diagnosis Date     Arthritis      CARDIOVASCULAR SCREENING; LDL GOAL LESS THAN 130 10/31/2010    age, fhx     DVT of lower extremity (deep venous thrombosis) (H) 11/09    rt. heterozygote. has seen heme-onc     Ex-cigarette smoker      Factor V Leiden Heterozygote       "Factor V Leiden mutation (H)     heterozygote. 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 [facility] Rakes[facility]     Current Outpatient Medications   Medication     CALCIUM 500 + D 500-200 MG-IU OR TABS     denosumab (PROLIA) 60 MG/ML SOSY injection     IMURAN 50 MG OR TABS     MULTI-VITAMIN OR TABS     OMEGA 3-6-9 FATTY ACIDS OR     OMEPRAZOLE 20 MG OR CPDR     rivaroxaban ANTICOAGULANT (XARELTO) 20 MG TABS tablet     VITAMIN B-12 1000 MCG/ML IJ SOLN     VITAMIN-B COMPLEX OR TABS     No current facility-administered medications for this visit.      Allergies   Allergen Reactions     Penicillins Hives     Throat closed       Physical Exam:/80 (BP Location: Right arm, Patient Position: Chair, Cuff Size: Adult Regular)   Pulse 89   Temp 98.7  F (37.1  C)   Ht 1.6 m (5' 3\")   Wt 63.9 kg (140 lb 14.4 oz)   SpO2 95%   BMI 24.96 kg/m     Constitutional: Alert, healthy, and in no distress.   ENT: Eyes bright , No mouth sores  Neck: Supple, No adenopathy.Thyroid symmetric, normal size  Cardiac: Heart rate and rhythm is regular and strong without murmur  Respiratory: Breathing easy. Lung sounds clear to auscultation  GI: Abdomen is soft, non-tender, BS normal. No masses or organomegaly  MS: Muscle tone normal, extremities normal with no edema.   Skin: No suspicious lesions or rashes- easy brusing - only bruise noted is on right hand between thumb and forefinger.   Neuro: Sensory grossly WNL, gait normal.   Lymph: Normal ant/post cervical, axillary, supraclavicular nodes  Psych: Mentation appears normal and affect normal/bright and smiling    Laboratory Results:    Ref. Range 11/9/2020 13:03   WBC Latest Ref Range: 4.0 - 11.0 10e9/L 7.8   Hemoglobin Latest Ref Range: 11.7 - 15.7 g/dL 12.9   Hematocrit Latest Ref Range: 35.0 - 47.0 % 38.0   Platelet Count Latest Ref Range: 150 - 450 10e9/L 242   RBC Count Latest Ref Range: 3.8 " - 5.2 10e12/L 3.87   MCV Latest Ref Range: 78 - 100 fl 98   MCH Latest Ref Range: 26.5 - 33.0 pg 33.3 (H)   MCHC Latest Ref Range: 31.5 - 36.5 g/dL 33.9   RDW Latest Ref Range: 10.0 - 15.0 % 13.6   Diff Method Unknown Automated Method   % Neutrophils Latest Units: % 70.0   % Lymphocytes Latest Units: % 22.2   % Monocytes Latest Units: % 5.8   % Eosinophils Latest Units: % 1.2   % Basophils Latest Units: % 0.5   % Immature Granulocytes Latest Units: % 0.3   Absolute Neutrophil Latest Ref Range: 1.6 - 8.3 10e9/L 5.4   Absolute Lymphocytes Latest Ref Range: 0.8 - 5.3 10e9/L 1.7   Absolute Monocytes Latest Ref Range: 0.0 - 1.3 10e9/L 0.5   Absolute Eosinophils Latest Ref Range: 0.0 - 0.7 10e9/L 0.1   Absolute Basophils Latest Ref Range: 0.0 - 0.2 10e9/L 0.0   Abs Immature Granulocytes Latest Ref Range: 0 - 0.4 10e9/L 0.0       Assessment and Plan  Chronic use of Anticoagulants- pt has Factor V leiden heterozygote,  with a right lower extremity DVT in 11/2009, unprovoked, and a history of superficial thrombophlebitis associated and provoked by exacerbation of Crohn's ileitis. She has a recommendation to continue with indefinite anticoagulation for life and was changed from warfarin to Xarelto 20 mg 2 years previous. She is tolerating well and would like to continue with the Xarelto at this time. Will provider 1 year of medication and ask that she return for CBC and hepatic panel if not recently completed by GI.   Crohn's disease is an additional risk factor for venous thromboembolism. She continues with every 3month review with [facility]. Remains on Imuran 50 mg daily for her Crohn's disease.  *She will be making a mammogram appt soon- Last was 11/8/2019 and was normal.   This was a 20 min visit with > 50% in education and management of concerns.   Genna Moy,CNP

## 2020-11-09 NOTE — NURSING NOTE
"Oncology Rooming Note    November 9, 2020 1:42 PM   Kathya Breaux is a 74 year old female who presents for:    Chief Complaint   Patient presents with     Oncology Clinic Visit     follow up - Xarelto refill     Initial Vitals: /80 (BP Location: Right arm, Patient Position: Chair, Cuff Size: Adult Regular)   Pulse 89   Temp 98.7  F (37.1  C)   Ht 1.6 m (5' 3\")   Wt 63.9 kg (140 lb 14.4 oz)   SpO2 95%   BMI 24.96 kg/m   Estimated body mass index is 24.96 kg/m  as calculated from the following:    Height as of this encounter: 1.6 m (5' 3\").    Weight as of this encounter: 63.9 kg (140 lb 14.4 oz). Body surface area is 1.69 meters squared.  No Pain (0) Comment: Data Unavailable   No LMP recorded. Patient has had a hysterectomy.  Allergies reviewed: Yes  Medications reviewed: Yes    Medications: Medication refills not needed today.  Pharmacy name entered into Paper.li:    [facility] [address] [place] [facility] [address]  [facility] - [address] [address] [place] [facility] [address]    Clinical concerns: NO Genna was notified.      Katlin Hathaway CMA              "

## 2020-11-09 NOTE — LETTER
11/9/2020         RE: Kathya Breaux  [address]  [facility] [address]        Dear Colleague,    Thank you for referring your patient, Kathya Breaux, to the [facility]. Please see a copy of my visit note below.    Hematology Follow Up Visit: November 9, 2020    Oncologist: Dr Babs Scherer  PCP: Phyllis Espinosa    Diagnosis:   Kathya Breaux is a 73 yo female - noted taken and updated from Dr Gzuman dated   1. Hx of RLE DVT - She was diagnosed in 11/2009 when she presented with right lower extremity pain and swelling and a  a right lower extremity ultrasound at which demonstrated a nonocclusive thrombus in the distal right popliteal vein and an occlusive thrombus extending into the peroneal and posterior tibial veins of the proximal right calf.  The common femoral veins of the right lower extremity demonstrated normal flow. Ultimately, her f/up RLE US in 03/2011 demonstrated resolution of DVT. She has been on warfarin since 11/2009 and moved to Xarelto in 11/2019.  2. History of superficial thrombophlebitis at the age of 17- acute thrombophlebitis treated with bed rest and anticoagulants in 10/1963. 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 She was on high dose steroids as well for her ileitis.    3. Factor V Leiden Heterozygote    Interval History: Ms Breaux comes to clinic for review use of use of Xarelto.Pt shares she likes using the xarelto compared to warfarin due to previous need for lab review. She is also noting easy bruising especially to hands and forearms. Has not smoked and is breathing easily with no problems with exertion. No new trauma or complaints other than she does have higher copay with the Xarelto over warfarin but is worth it with less monitoring per pt.   Rest of comprehensive and complete ROS is reviewed and is negative.   Past Medical History:  "  Diagnosis Date     Arthritis      CARDIOVASCULAR SCREENING; LDL GOAL LESS THAN 130 10/31/2010    age, fhx     DVT of lower extremity (deep venous thrombosis) (H) 11/09    rt. heterozygote. has seen heme-onc     Ex-cigarette smoker      Factor V Leiden Heterozygote      Factor V Leiden mutation (H)     heterozygote. 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 [facility] Mariela Ossian     Current Outpatient Medications   Medication     CALCIUM 500 + D 500-200 MG-IU OR TABS     denosumab (PROLIA) 60 MG/ML SOSY injection     IMURAN 50 MG OR TABS     MULTI-VITAMIN OR TABS     OMEGA 3-6-9 FATTY ACIDS OR     OMEPRAZOLE 20 MG OR CPDR     rivaroxaban ANTICOAGULANT (XARELTO) 20 MG TABS tablet     VITAMIN B-12 1000 MCG/ML IJ SOLN     VITAMIN-B COMPLEX OR TABS     No current facility-administered medications for this visit.      Allergies   Allergen Reactions     Penicillins Hives     Throat closed       Physical Exam:/80 (BP Location: Right arm, Patient Position: Chair, Cuff Size: Adult Regular)   Pulse 89   Temp 98.7  F (37.1  C)   Ht 1.6 m (5' 3\")   Wt 63.9 kg (140 lb 14.4 oz)   SpO2 95%   BMI 24.96 kg/m     Constitutional: Alert, healthy, and in no distress.   ENT: Eyes bright , No mouth sores  Neck: Supple, No adenopathy.Thyroid symmetric, normal size  Cardiac: Heart rate and rhythm is regular and strong without murmur  Respiratory: Breathing easy. Lung sounds clear to auscultation  GI: Abdomen is soft, non-tender, BS normal. No masses or organomegaly  MS: Muscle tone normal, extremities normal with no edema.   Skin: No suspicious lesions or rashes- easy brusing - only bruise noted is on right hand between thumb and forefinger.   Neuro: Sensory grossly WNL, gait normal.   Lymph: Normal ant/post cervical, axillary, supraclavicular nodes  Psych: Mentation appears normal and affect normal/bright and smiling    Laboratory " Results:    Ref. Range 11/9/2020 13:03   WBC Latest Ref Range: 4.0 - 11.0 10e9/L 7.8   Hemoglobin Latest Ref Range: 11.7 - 15.7 g/dL 12.9   Hematocrit Latest Ref Range: 35.0 - 47.0 % 38.0   Platelet Count Latest Ref Range: 150 - 450 10e9/L 242   RBC Count Latest Ref Range: 3.8 - 5.2 10e12/L 3.87   MCV Latest Ref Range: 78 - 100 fl 98   MCH Latest Ref Range: 26.5 - 33.0 pg 33.3 (H)   MCHC Latest Ref Range: 31.5 - 36.5 g/dL 33.9   RDW Latest Ref Range: 10.0 - 15.0 % 13.6   Diff Method Unknown Automated Method   % Neutrophils Latest Units: % 70.0   % Lymphocytes Latest Units: % 22.2   % Monocytes Latest Units: % 5.8   % Eosinophils Latest Units: % 1.2   % Basophils Latest Units: % 0.5   % Immature Granulocytes Latest Units: % 0.3   Absolute Neutrophil Latest Ref Range: 1.6 - 8.3 10e9/L 5.4   Absolute Lymphocytes Latest Ref Range: 0.8 - 5.3 10e9/L 1.7   Absolute Monocytes Latest Ref Range: 0.0 - 1.3 10e9/L 0.5   Absolute Eosinophils Latest Ref Range: 0.0 - 0.7 10e9/L 0.1   Absolute Basophils Latest Ref Range: 0.0 - 0.2 10e9/L 0.0   Abs Immature Granulocytes Latest Ref Range: 0 - 0.4 10e9/L 0.0       Assessment and Plan  Chronic use of Anticoagulants- pt has Factor V leiden heterozygote,  with a right lower extremity DVT in 11/2009, unprovoked, and a history of superficial thrombophlebitis associated and provoked by exacerbation of Crohn's ileitis. She has a recommendation to continue with indefinite anticoagulation for life and was changed from warfarin to Xarelto 20 mg 2 years previous. She is tolerating well and would like to continue with the Xarelto at this time. Will provider 1 year of medication and ask that she return for CBC and hepatic panel if not recently completed by GI.   Crohn's disease is an additional risk factor for venous thromboembolism. She continues with every 3month review with [facility]. Remains on Imuran 50 mg daily for her Crohn's disease.  *She will be making a mammogram appt soon- Last was  11/8/2019 and was normal.   This was a 20 min visit with > 50% in education and management of concerns.   Genan Moy CNP        Again, thank you for allowing me to participate in the care of your patient.        Sincerely,        Genna Moy, NP, APRN CNP

## 2020-12-01 ENCOUNTER — ANCILLARY PROCEDURE (OUTPATIENT)
Dept: MAMMOGRAPHY | Facility: CLINIC | Age: 75
End: 2020-12-01
Attending: NURSE PRACTITIONER
Payer: COMMERCIAL

## 2020-12-01 DIAGNOSIS — Z12.31 SCREENING MAMMOGRAM FOR HIGH-RISK PATIENT: ICD-10-CM

## 2020-12-01 PROCEDURE — 77067 SCR MAMMO BI INCL CAD: CPT | Mod: TC | Performed by: RADIOLOGY

## 2020-12-04 NOTE — PROGRESS NOTES
Patient did not return for further treatment and no additional progress was noted.  Please refer to the progress note and goal flowsheet completed on   for discharge information.    Answers for HPI/ROS submitted by the patient on 3/6/2020   History Reported by Patient  Reason for Visit:: soreness in right thigh and right ankle  When problem began:: 12/29/2019  How problem occurred:: Slipped on ice  Number scale: 1/10  General health as reported by patient: good  Please check all that apply to your current or past medical history: osteoarthritis, osteoporosis  Medical allergies: none  Surgeries: other  Other Surgery Detail: 2 bowel surgeries for Crohns and a partial hysterectomy  Medications you are currently taking: none  Occupation:: Retired

## 2021-01-10 ENCOUNTER — HEALTH MAINTENANCE LETTER (OUTPATIENT)
Age: 76
End: 2021-01-10

## 2021-04-06 ENCOUNTER — TRANSFERRED RECORDS (OUTPATIENT)
Dept: HEALTH INFORMATION MANAGEMENT | Facility: CLINIC | Age: 76
End: 2021-04-06

## 2021-04-06 LAB
ALT SERPL-CCNC: 23 U/L (ref 0–78)
AST SERPL-CCNC: 19 U/L (ref 0–37)

## 2021-04-14 NOTE — PROGRESS NOTES
History of Present Illness - Kathya Breaux is a 75 year old female last seen on 10/19/2020, for procedural removal of severe cerumen impaction.  She is here for 6 month follow up and ear check.    She reports that her ears have progressively felt stuffy again, especially over the last month,    Otherwise, no ear pain, no blood or purulence from the ears.  She denies any vertigo or headache, but the hearing is stuffy.    Past Medical History -   Patient Active Problem List   Diagnosis     Personal history of DVT (deep vein thrombosis)     Factor V Leiden Heterozygote     CARDIOVASCULAR SCREENING; LDL GOAL LESS THAN 130     Long-term (current) use of anticoagulants [Z79.01]     Posterior vitreous detachment, bilateral     Hx of LASIK     Crohn's disease of both small and large intestine with other complication (H)     Age-related osteoporosis without current pathological fracture       Current Medications -   Current Outpatient Medications:      CALCIUM 500 + D 500-200 MG-IU OR TABS, 1 TABLET DAILY, Disp: , Rfl: 0     denosumab (PROLIA) 60 MG/ML SOSY injection, Inject 1 mL (60 mg) Subcutaneous every 6 months, Disp: 1 mL, Rfl: 1     IMURAN 50 MG OR TABS, 1 TABLET DAILY, Disp: , Rfl:      MULTI-VITAMIN OR TABS, 1 TABLET DAILY, Disp: , Rfl: 0     OMEGA 3-6-9 FATTY ACIDS OR, 1 TABLET DAILY, Disp: , Rfl:      OMEPRAZOLE 20 MG OR CPDR, 1 CAPSULE DAILY, Disp: , Rfl:      rivaroxaban ANTICOAGULANT (XARELTO) 20 MG TABS tablet, Take 1 tablet (20 mg) by mouth daily (with dinner), Disp: 90 tablet, Rfl: 3     VITAMIN B-12 1000 MCG/ML IJ SOLN, 1000 MCG Monthly, Disp: , Rfl: 0     VITAMIN-B COMPLEX OR TABS, 1 tablet daily, Disp: , Rfl:     Allergies -   Allergies   Allergen Reactions     Penicillins Hives     Throat closed       Social History -   Social History     Social History     Marital status:      Spouse name: N/A     Number of children: N/A     Years of education: N/A     Social History Main Topics     Smoking  status: Former Smoker     Packs/day: 0.50     Years: 38.00     Quit date: 3/1/2008     Smokeless tobacco: Never Used      Comment: 6 cigs per day     Alcohol use No     Drug use: No     Sexual activity: Yes     Partners: Male     Other Topics Concern     Not on file     Social History Narrative    Caffeine intake/servings daily - 2-3    Calcium intake/servings daily - 1-2+ plus 1000mg calcium supp    Exercise 0 times weekly - describe    Sunscreen used - Yes    Seatbelts used - Yes    Guns stored in the home - No    Self Breast Exam - Yes    Pap test up to date -  Yes     Eye exam up to date -  Yes    Dental exam up to date -  Yes    DEXA scan up to date -  2005    Flex Sig/Colonoscopy up to date -  Yes 2006    Mammography up to date -  Yes 4/2006    Immunizations reviewed and up to date - Yes 1998    Abuse: Current or Past (Physical, Sexual or Emotional) - No    Do you feel safe in your environment - Yes    Do you cope well with stress - Yes    Do you suffer from insomnia - No    Last updated by: Char Strauss 3/8/2007       Family History -   Family History   Problem Relation Age of Onset     Heart Disease Father         dysrythmia's     Glaucoma Father      Heart Disease Son         clogged arteries     Cardiovascular Son         sudden heart attack     Circulatory Mother         phelbities     Genetic Disorder Mother         factor V leiden     Unknown/Adopted Maternal Grandmother      Gynecology Maternal Grandfather      Cerebrovascular Disease Paternal Grandmother      Cerebrovascular Disease Paternal Grandfather      Alcohol/Drug Brother         alcohol and drugs     Alcohol/Drug Sister         alcohol and drugs     Breast Cancer Cousin      Colon Cancer Other      Anxiety Disorder Sister      Substance Abuse Sister      Anxiety Disorder Brother      Substance Abuse Brother        Review of Systems - As per HPI and PMHx, otherwise 10+ system review of the head and neck, and general constitution is  "negative.    Physical Exam  /68   Pulse 62   Resp 16   Ht 1.6 m (5' 3\")   Wt 63.5 kg (140 lb)   SpO2 98%   BMI 24.80 kg/m      General - The patient is well nourished and well developed, and appears to have good nutritional status.  Alert and oriented to person and place, answers questions and cooperates with examination appropriately.   Head and Face - Normocephalic and atraumatic, with no gross asymmetry noted of the contour of the facial features.  The facial nerve is intact, with strong symmetric movements.  Voice and Breathing - The patient was breathing comfortably without the use of accessory muscles. There was no wheezing, stridor, or stertor.  The patients voice was clear and strong, and had appropriate pitch and quality.  Eyes - Extraocular movements intact, and the pupils were reactive to light.  Sclera were not icteric or injected, conjunctiva were pink and moist.    Cerumen Removal    Physical Exam and Procedure  Ears - On examination of the ears, I found that the BOTH ears were completely impacted with dense cerumen and her hearing aid ear piece tips.  Therefore, I positioned them in the examination chair in a semi-supine position, beginning with the right side.  I used the binocular surgical microscope to perform cerumen removal.  I began by using a cerumen loop to gently lift the edges of the cerumen mass away from the walls of the external canal.  Once I did this, I was able to suction away fragments of wax and debris using suction.  Once the mass was loose enough, the entire plug was pulled from the canal.  The tympanic membrane was intact, no sign of perforation or middle ear effusion.    I turned my attention to the contralateral side once again using the binocular surgical microscope to perform cerumen removal and ear piece tip.  I began by using a cerumen loop to gently lift the edges of the cerumen mass away from the walls of the external canal.  Once I did this, I was able to " suction away fragments of wax and debris using suction.  Once the mass was loose enough, the entire plug was pulled from the canal.  The tympanic membrane was intact, no sign of perforation or middle ear effusion.      A/P - Kathya Breaux is a 75 year old female  (H61.23) Bilateral impacted cerumen  (primary encounter diagnosis)  (H93.8X3) Sensation of fullness in both ears    The patient has had their cerumen and ear piece tips procedurally removed today.  I have discussed ear care at home, including avoiding qtips or any other object placed in the canal.  I have also discussed that over the counter cerumen kits like Debrox or Cerumenex can be useful.    If no other issues, follow up with me in one year for an ear check.

## 2021-04-16 ENCOUNTER — OFFICE VISIT (OUTPATIENT)
Dept: OTOLARYNGOLOGY | Facility: CLINIC | Age: 76
End: 2021-04-16
Payer: COMMERCIAL

## 2021-04-16 VITALS
DIASTOLIC BLOOD PRESSURE: 68 MMHG | HEIGHT: 63 IN | SYSTOLIC BLOOD PRESSURE: 126 MMHG | OXYGEN SATURATION: 98 % | HEART RATE: 62 BPM | WEIGHT: 140 LBS | BODY MASS INDEX: 24.8 KG/M2 | RESPIRATION RATE: 16 BRPM

## 2021-04-16 DIAGNOSIS — H93.8X3 SENSATION OF FULLNESS IN BOTH EARS: ICD-10-CM

## 2021-04-16 DIAGNOSIS — H61.23 BILATERAL IMPACTED CERUMEN: Primary | ICD-10-CM

## 2021-04-16 DIAGNOSIS — H90.3 SENSORINEURAL HEARING LOSS (SNHL) OF BOTH EARS: ICD-10-CM

## 2021-04-16 PROCEDURE — 69210 REMOVE IMPACTED EAR WAX UNI: CPT | Performed by: OTOLARYNGOLOGY

## 2021-04-16 PROCEDURE — 99213 OFFICE O/P EST LOW 20 MIN: CPT | Mod: 25 | Performed by: OTOLARYNGOLOGY

## 2021-04-16 ASSESSMENT — PAIN SCALES - GENERAL: PAINLEVEL: NO PAIN (0)

## 2021-04-16 ASSESSMENT — MIFFLIN-ST. JEOR: SCORE: 1099.17

## 2021-04-16 NOTE — PATIENT INSTRUCTIONS
Scheduling Information  To schedule your CT/MRI scan, please contact [facility] at [phone] OR [facility] at [phone]    To schedule your Surgery, please contact our Specialty Schedulers at [phone]      ENT Clinic Locations Clinic Hours Telephone Number     Mehreen Kumar  [address] [place] [address]   Monday:           1:00pm -- 5:00pm    Friday:              8:00am - 12:00pm   To schedule/reschedule an appointment with   Dr. Moore,   please contact our   Specialty Scheduling Department at:     [phone]       Mehreen Cordvoa  [address] [place] [address] Tuesday:          8:00am -- 2:00pm         Urgent Care Locations Clinic Hours Telephone Numbers     Mehreen Cordova  [address] [place] [address]     Monday-Friday:     11:00am - 9:00pm    Saturday-Sunday:  9:00am - 5:00pm   [phone]     [facility]  [address] [place] [address]     Monday-Friday:      5:00pm - 9:00pm     Saturday-Sunday:  9:00am - 5:00pm   [phone]

## 2021-04-16 NOTE — LETTER
4/16/2021         RE: Kathya Breaux  [address]  [facility] [address]        Dear Colleague,    Thank you for referring your patient, Kathya Breaux, to the [facility]. Please see a copy of my visit note below.    History of Present Illness - Kathya Breaux is a 75 year old female last seen on 10/19/2020, for procedural removal of severe cerumen impaction.  She is here for 6 month follow up and ear check.    She reports that her ears have progressively felt stuffy again, especially over the last month,    Otherwise, no ear pain, no blood or purulence from the ears.  She denies any vertigo or headache, but the hearing is stuffy.    Past Medical History -   Patient Active Problem List   Diagnosis     Personal history of DVT (deep vein thrombosis)     Factor V Leiden Heterozygote     CARDIOVASCULAR SCREENING; LDL GOAL LESS THAN 130     Long-term (current) use of anticoagulants [Z79.01]     Posterior vitreous detachment, bilateral     Hx of LASIK     Crohn's disease of both small and large intestine with other complication (H)     Age-related osteoporosis without current pathological fracture       Current Medications -   Current Outpatient Medications:      CALCIUM 500 + D 500-200 MG-IU OR TABS, 1 TABLET DAILY, Disp: , Rfl: 0     denosumab (PROLIA) 60 MG/ML SOSY injection, Inject 1 mL (60 mg) Subcutaneous every 6 months, Disp: 1 mL, Rfl: 1     IMURAN 50 MG OR TABS, 1 TABLET DAILY, Disp: , Rfl:      MULTI-VITAMIN OR TABS, 1 TABLET DAILY, Disp: , Rfl: 0     OMEGA 3-6-9 FATTY ACIDS OR, 1 TABLET DAILY, Disp: , Rfl:      OMEPRAZOLE 20 MG OR CPDR, 1 CAPSULE DAILY, Disp: , Rfl:      rivaroxaban ANTICOAGULANT (XARELTO) 20 MG TABS tablet, Take 1 tablet (20 mg) by mouth daily (with dinner), Disp: 90 tablet, Rfl: 3     VITAMIN B-12 1000 MCG/ML IJ SOLN, 1000 MCG Monthly, Disp: , Rfl: 0     VITAMIN-B COMPLEX OR TABS, 1 tablet daily, Disp: , Rfl:     Allergies -   Allergies    Allergen Reactions     Penicillins Hives     Throat closed       Social History -   Social History     Social History     Marital status:      Spouse name: N/A     Number of children: N/A     Years of education: N/A     Social History Main Topics     Smoking status: Former Smoker     Packs/day: 0.50     Years: 38.00     Quit date: 3/1/2008     Smokeless tobacco: Never Used      Comment: 6 cigs per day     Alcohol use No     Drug use: No     Sexual activity: Yes     Partners: Male     Other Topics Concern     Not on file     Social History Narrative    Caffeine intake/servings daily - 2-3    Calcium intake/servings daily - 1-2+ plus 1000mg calcium supp    Exercise 0 times weekly - describe    Sunscreen used - Yes    Seatbelts used - Yes    Guns stored in the home - No    Self Breast Exam - Yes    Pap test up to date -  Yes     Eye exam up to date -  Yes    Dental exam up to date -  Yes    DEXA scan up to date -  2005    Flex Sig/Colonoscopy up to date -  Yes 2006    Mammography up to date -  Yes 4/2006    Immunizations reviewed and up to date - Yes 1998    Abuse: Current or Past (Physical, Sexual or Emotional) - No    Do you feel safe in your environment - Yes    Do you cope well with stress - Yes    Do you suffer from insomnia - No    Last updated by: Char Strauss 3/8/2007       Family History -   Family History   Problem Relation Age of Onset     Heart Disease Father         dysrythmia's     Glaucoma Father      Heart Disease Son         clogged arteries     Cardiovascular Son         sudden heart attack     Circulatory Mother         phelbities     Genetic Disorder Mother         factor V leiden     Unknown/Adopted Maternal Grandmother      Gynecology Maternal Grandfather      Cerebrovascular Disease Paternal Grandmother      Cerebrovascular Disease Paternal Grandfather      Alcohol/Drug Brother         alcohol and drugs     Alcohol/Drug Sister         alcohol and drugs     Breast Cancer Cousin      Colon  "Cancer Other      Anxiety Disorder Sister      Substance Abuse Sister      Anxiety Disorder Brother      Substance Abuse Brother        Review of Systems - As per HPI and PMHx, otherwise 10+ system review of the head and neck, and general constitution is negative.    Physical Exam  /68   Pulse 62   Resp 16   Ht 1.6 m (5' 3\")   Wt 63.5 kg (140 lb)   SpO2 98%   BMI 24.80 kg/m      General - The patient is well nourished and well developed, and appears to have good nutritional status.  Alert and oriented to person and place, answers questions and cooperates with examination appropriately.   Head and Face - Normocephalic and atraumatic, with no gross asymmetry noted of the contour of the facial features.  The facial nerve is intact, with strong symmetric movements.  Voice and Breathing - The patient was breathing comfortably without the use of accessory muscles. There was no wheezing, stridor, or stertor.  The patients voice was clear and strong, and had appropriate pitch and quality.  Eyes - Extraocular movements intact, and the pupils were reactive to light.  Sclera were not icteric or injected, conjunctiva were pink and moist.    Cerumen Removal    Physical Exam and Procedure  Ears - On examination of the ears, I found that the BOTH ears were completely impacted with dense cerumen and her hearing aid ear piece tips.  Therefore, I positioned them in the examination chair in a semi-supine position, beginning with the right side.  I used the binocular surgical microscope to perform cerumen removal.  I began by using a cerumen loop to gently lift the edges of the cerumen mass away from the walls of the external canal.  Once I did this, I was able to suction away fragments of wax and debris using suction.  Once the mass was loose enough, the entire plug was pulled from the canal.  The tympanic membrane was intact, no sign of perforation or middle ear effusion.    I turned my attention to the contralateral side " once again using the binocular surgical microscope to perform cerumen removal and ear piece tip.  I began by using a cerumen loop to gently lift the edges of the cerumen mass away from the walls of the external canal.  Once I did this, I was able to suction away fragments of wax and debris using suction.  Once the mass was loose enough, the entire plug was pulled from the canal.  The tympanic membrane was intact, no sign of perforation or middle ear effusion.      A/P - Kathya Breaux is a 75 year old female  (H61.23) Bilateral impacted cerumen  (primary encounter diagnosis)  (H93.8X3) Sensation of fullness in both ears    The patient has had their cerumen and ear piece tips procedurally removed today.  I have discussed ear care at home, including avoiding qtips or any other object placed in the canal.  I have also discussed that over the counter cerumen kits like Debrox or Cerumenex can be useful.    If no other issues, follow up with me in one year for an ear check.          Again, thank you for allowing me to participate in the care of your patient.        Sincerely,        Oscar Moore MD

## 2021-05-11 ENCOUNTER — TELEPHONE (OUTPATIENT)
Dept: ONCOLOGY | Facility: CLINIC | Age: 76
End: 2021-05-11

## 2021-05-11 NOTE — TELEPHONE ENCOUNTER
Received voice message from Ann Marie at [facility] asking for approval for patient to hold Xarelto for 3 days prior to procedure per protocol. Return call made to [facility]. Left message that patient can hold Xarelto per their protocol.

## 2021-06-08 ENCOUNTER — TRANSFERRED RECORDS (OUTPATIENT)
Dept: HEALTH INFORMATION MANAGEMENT | Facility: CLINIC | Age: 76
End: 2021-06-08

## 2021-06-11 ENCOUNTER — TELEPHONE (OUTPATIENT)
Dept: AUDIOLOGY | Facility: CLINIC | Age: 76
End: 2021-06-11

## 2021-06-11 NOTE — TELEPHONE ENCOUNTER
Patient needs alenae for hearing aid. Mini fit bass 6mm double vent. She would  Like two pkgs. Ok to leave a detailed message.

## 2021-06-28 ENCOUNTER — TRANSFERRED RECORDS (OUTPATIENT)
Dept: HEALTH INFORMATION MANAGEMENT | Facility: CLINIC | Age: 76
End: 2021-06-28

## 2021-06-28 LAB
ALT SERPL-CCNC: 23 U/L (ref 0–78)
AST SERPL-CCNC: 23 U/L (ref 0–37)

## 2021-07-13 ENCOUNTER — APPOINTMENT (OUTPATIENT)
Dept: URBAN - METROPOLITAN AREA CLINIC 254 | Age: 76
Setting detail: DERMATOLOGY
End: 2021-07-13

## 2021-07-13 VITALS — WEIGHT: 135 LBS | HEIGHT: 63 IN

## 2021-07-13 DIAGNOSIS — D18.0 HEMANGIOMA: ICD-10-CM

## 2021-07-13 DIAGNOSIS — L82.1 OTHER SEBORRHEIC KERATOSIS: ICD-10-CM

## 2021-07-13 DIAGNOSIS — D485 NEOPLASM OF UNCERTAIN BEHAVIOR OF SKIN: ICD-10-CM

## 2021-07-13 DIAGNOSIS — M71 OTHER BURSOPATHIES: ICD-10-CM

## 2021-07-13 DIAGNOSIS — Z71.89 OTHER SPECIFIED COUNSELING: ICD-10-CM

## 2021-07-13 PROBLEM — D48.5 NEOPLASM OF UNCERTAIN BEHAVIOR OF SKIN: Status: ACTIVE | Noted: 2021-07-13

## 2021-07-13 PROBLEM — D18.01 HEMANGIOMA OF SKIN AND SUBCUTANEOUS TISSUE: Status: ACTIVE | Noted: 2021-07-13

## 2021-07-13 PROBLEM — M71.341 OTHER BURSAL CYST, RIGHT HAND: Status: ACTIVE | Noted: 2021-07-13

## 2021-07-13 PROCEDURE — 88305 TISSUE EXAM BY PATHOLOGIST: CPT

## 2021-07-13 PROCEDURE — 11102 TANGNTL BX SKIN SINGLE LES: CPT

## 2021-07-13 PROCEDURE — OTHER COUNSELING: OTHER

## 2021-07-13 PROCEDURE — OTHER PATHOLOGY BILLING: OTHER

## 2021-07-13 PROCEDURE — OTHER BIOPSY BY SHAVE METHOD: OTHER

## 2021-07-13 PROCEDURE — 99213 OFFICE O/P EST LOW 20 MIN: CPT | Mod: 25

## 2021-07-13 ASSESSMENT — LOCATION DETAILED DESCRIPTION DERM
LOCATION DETAILED: LEFT SUPERIOR UPPER BACK
LOCATION DETAILED: RIGHT PROXIMAL RADIAL DORSAL FOREARM
LOCATION DETAILED: RIGHT INDEX DISTAL INTERPHALANGEAL JOINT

## 2021-07-13 ASSESSMENT — LOCATION ZONE DERM
LOCATION ZONE: ARM
LOCATION ZONE: TRUNK
LOCATION ZONE: FINGER

## 2021-07-13 ASSESSMENT — LOCATION SIMPLE DESCRIPTION DERM
LOCATION SIMPLE: RIGHT INDEX FINGER
LOCATION SIMPLE: RIGHT FOREARM
LOCATION SIMPLE: LEFT UPPER BACK

## 2021-07-13 NOTE — PROCEDURE: PATHOLOGY BILLING
Rendering Text In Billing: The slides will be read by [employer] and reported in the attached document Rendering Text In Billing: The slides will be read by [employer] and reported in the attached document

## 2021-07-13 NOTE — PROCEDURE: BIOPSY BY SHAVE METHOD
Post-Care Instructions: I reviewed with the patient in detail post-care instructions. Patient is to keep the biopsy site dry overnight, and then apply bacitracin twice daily until healed. Patient may apply hydrogen peroxide soaks to remove any crusting.
Validate Lesion Size: No
Type Of Destruction Used: Curettage
Cryotherapy Text: The wound bed was treated with cryotherapy after the biopsy was performed.
Hemostasis: Electrocautery
Silver Nitrate Text: The wound bed was treated with silver nitrate after the biopsy was performed.
Notification Instructions: Patient will be notified of biopsy results. However, patient instructed to call the office if not contacted within 2 weeks.
X Size Of Lesion In Cm: 0
Information: Selecting Yes will display possible errors in your note based on the variables you have selected. This validation is only offered as a suggestion for you. PLEASE NOTE THAT THE VALIDATION TEXT WILL BE REMOVED WHEN YOU FINALIZE YOUR NOTE. IF YOU WANT TO FAX A PRELIMINARY NOTE YOU WILL NEED TO TOGGLE THIS TO 'NO' IF YOU DO NOT WANT IT IN YOUR FAXED NOTE.
Wound Care: Petrolatum
Was A Bandage Applied: Yes
Anesthesia Type: 1% lidocaine with epinephrine
Electrodesiccation And Curettage Text: The wound bed was treated with electrodesiccation and curettage after the biopsy was performed.
Consent: Written consent was obtained and risks were reviewed including but not limited to scarring, infection, bleeding, scabbing, incomplete removal, nerve damage and allergy to anesthesia.
Electrodesiccation Text: The wound bed was treated with electrodesiccation after the biopsy was performed.
Anesthesia Volume In Cc (Will Not Render If 0): 0.5
Biopsy Method: Dermablade
Billing Type: Client Bill
Curettage Text: The wound bed was treated with curettage after the biopsy was performed.
Detail Level: Detailed
Depth Of Biopsy: dermis
Biopsy Type: H and E
Dressing: bandage

## 2021-07-13 NOTE — PROCEDURE: PATHOLOGY BILLING
Immunohistochemistry ([id number] and [id number]) billing is not performed here. Please use the Immunohistochemistry Stain Billing plan to accomplish this. Immunohistochemistry ([id number] and [id number]) billing is not performed here. Please use the Immunohistochemistry Stain Billing plan to accomplish this.

## 2021-07-20 ENCOUNTER — TRANSFERRED RECORDS (OUTPATIENT)
Dept: HEALTH INFORMATION MANAGEMENT | Facility: CLINIC | Age: 76
End: 2021-07-20

## 2021-07-21 ENCOUNTER — TRANSFERRED RECORDS (OUTPATIENT)
Dept: HEALTH INFORMATION MANAGEMENT | Facility: CLINIC | Age: 76
End: 2021-07-21

## 2021-07-26 ENCOUNTER — TRANSFERRED RECORDS (OUTPATIENT)
Dept: HEALTH INFORMATION MANAGEMENT | Facility: CLINIC | Age: 76
End: 2021-07-26

## 2021-07-27 ENCOUNTER — OFFICE VISIT (OUTPATIENT)
Dept: AUDIOLOGY | Facility: CLINIC | Age: 76
End: 2021-07-27
Payer: COMMERCIAL

## 2021-07-27 DIAGNOSIS — H90.3 SENSORINEURAL HEARING LOSS, BILATERAL: Primary | ICD-10-CM

## 2021-07-27 PROCEDURE — 99207 PR NO CHARGE LOS: CPT | Performed by: AUDIOLOGIST

## 2021-07-27 PROCEDURE — V5299 HEARING SERVICE: HCPCS | Performed by: AUDIOLOGIST

## 2021-07-27 NOTE — PROGRESS NOTES
AUDIOLOGY REPORT: HEARING AID RECHECK    SUBJECTIVE: Kathya Breaux is a 75 year old female, :  1945, was seen in the Audiology Clinic at [facility] on 21 for a return check of their hearing aids. Patient was unaccompanied to today's visit.       Background:   Patient is here today with the complaint of the left ear hearing aid is not working.     Procedures:       SIDE: Left    : OtLoveLula    TYPE: OPN S2 R RITE    S/N: [id number]    WARRANTY: 2023    It was found that the  was not functioning. A replacement size 1 85dB  was placed from stock and the hearing aid was returned to function. The 6 mm double vent dome and waxguard was replaced. Biologic listening check found the hearing aid sounding crisp and clear.     Plan:   Patient will return as needed for hearing aid concerns.     NO CHARGE VISIT    Wagner Almanza CCC-A  Licensed Audiologist #[id number]  2021

## 2021-07-30 ENCOUNTER — TRANSFERRED RECORDS (OUTPATIENT)
Dept: HEALTH INFORMATION MANAGEMENT | Facility: CLINIC | Age: 76
End: 2021-07-30

## 2021-09-17 DIAGNOSIS — D68.51 FACTOR V LEIDEN MUTATION (H): ICD-10-CM

## 2021-09-17 DIAGNOSIS — Z86.718 PERSONAL HISTORY OF DVT (DEEP VEIN THROMBOSIS): ICD-10-CM

## 2021-09-20 RX ORDER — RIVAROXABAN 20 MG/1
TABLET, FILM COATED ORAL
Qty: 90 TABLET | Refills: 0 | Status: SHIPPED | OUTPATIENT
Start: 2021-09-20 | End: 2022-01-23

## 2021-10-06 NOTE — PROGRESS NOTES
History of Present Illness - Kathya Breaux is a 75 year old female last seen on 4/16/21, for procedural removal of severe cerumen impaction.  She is here for 6 month follow up and ear check.    She reports that her ears have progressively felt stuffy again, especially over the last month,    Otherwise, no ear pain, no blood or purulence from the ears.  She denies any vertigo or headache, but the hearing is stuffy.    Past Medical History -   Patient Active Problem List   Diagnosis     Personal history of DVT (deep vein thrombosis)     Factor V Leiden Heterozygote     CARDIOVASCULAR SCREENING; LDL GOAL LESS THAN 130     Long-term (current) use of anticoagulants [Z79.01]     Posterior vitreous detachment, bilateral     Hx of LASIK     Crohn's disease of both small and large intestine with other complication (H)     Age-related osteoporosis without current pathological fracture       Current Medications -   Current Outpatient Medications:      CALCIUM 500 + D 500-200 MG-IU OR TABS, 1 TABLET DAILY, Disp: , Rfl: 0     denosumab (PROLIA) 60 MG/ML SOSY injection, Inject 1 mL (60 mg) Subcutaneous every 6 months, Disp: 1 mL, Rfl: 1     IMURAN 50 MG OR TABS, 1 TABLET DAILY, Disp: , Rfl:      MULTI-VITAMIN OR TABS, 1 TABLET DAILY, Disp: , Rfl: 0     OMEGA 3-6-9 FATTY ACIDS OR, 1 TABLET DAILY, Disp: , Rfl:      OMEPRAZOLE 20 MG OR CPDR, 1 CAPSULE DAILY, Disp: , Rfl:      VITAMIN B-12 1000 MCG/ML IJ SOLN, 1000 MCG Monthly, Disp: , Rfl: 0     VITAMIN-B COMPLEX OR TABS, 1 tablet daily, Disp: , Rfl:      XARELTO ANTICOAGULANT 20 MG TABS tablet, TAKE 1 TABLET BY MOUTH  DAILY WITH DINNER, Disp: 90 tablet, Rfl: 0    Allergies -   Allergies   Allergen Reactions     Penicillins Hives     Throat closed       Social History -   Social History     Social History     Marital status:      Spouse name: N/A     Number of children: N/A     Years of education: N/A     Social History Main Topics     Smoking status: Former Smoker      Packs/day: 0.50     Years: 38.00     Quit date: 3/1/2008     Smokeless tobacco: Never Used      Comment: 6 cigs per day     Alcohol use No     Drug use: No     Sexual activity: Yes     Partners: Male     Other Topics Concern     Not on file     Social History Narrative    Caffeine intake/servings daily - 2-3    Calcium intake/servings daily - 1-2+ plus 1000mg calcium supp    Exercise 0 times weekly - describe    Sunscreen used - Yes    Seatbelts used - Yes    Guns stored in the home - No    Self Breast Exam - Yes    Pap test up to date -  Yes     Eye exam up to date -  Yes    Dental exam up to date -  Yes    DEXA scan up to date -  2005    Flex Sig/Colonoscopy up to date -  Yes 2006    Mammography up to date -  Yes 4/2006    Immunizations reviewed and up to date - Yes 1998    Abuse: Current or Past (Physical, Sexual or Emotional) - No    Do you feel safe in your environment - Yes    Do you cope well with stress - Yes    Do you suffer from insomnia - No    Last updated by: Char Strauss 3/8/2007       Family History -   Family History   Problem Relation Age of Onset     Heart Disease Father         dysrythmia's     Glaucoma Father      Heart Disease Son         clogged arteries     Cardiovascular Son         sudden heart attack     Circulatory Mother         phelbities     Genetic Disorder Mother         factor V leiden     Unknown/Adopted Maternal Grandmother      Gynecology Maternal Grandfather      Cerebrovascular Disease Paternal Grandmother      Cerebrovascular Disease Paternal Grandfather      Alcohol/Drug Brother         alcohol and drugs     Alcohol/Drug Sister         alcohol and drugs     Breast Cancer Cousin      Colon Cancer Other      Anxiety Disorder Sister      Substance Abuse Sister      Anxiety Disorder Brother      Substance Abuse Brother        Review of Systems - As per HPI and PMHx, otherwise 10+ system review of the head and neck, and general constitution is negative.    Physical Exam  There were  no vitals taken for this visit.    General - The patient is well nourished and well developed, and appears to have good nutritional status.  Alert and oriented to person and place, answers questions and cooperates with examination appropriately.   Head and Face - Normocephalic and atraumatic, with no gross asymmetry noted of the contour of the facial features.  The facial nerve is intact, with strong symmetric movements.  Voice and Breathing - The patient was breathing comfortably without the use of accessory muscles. There was no wheezing, stridor, or stertor.  The patients voice was clear and strong, and had appropriate pitch and quality.  Eyes - Extraocular movements intact, and the pupils were reactive to light.  Sclera were not icteric or injected, conjunctiva were pink and moist.    Cerumen Removal    Physical Exam and Procedure  Ears - On examination of the ears, I found that the BOTH ears were completely impacted with dense cerumen and her hearing aid ear piece tips.  Therefore, I positioned them in the examination chair in a semi-supine position, beginning with the right side.  I used the binocular surgical microscope to perform cerumen removal.  I began by using a cerumen loop to gently lift the edges of the cerumen mass away from the walls of the external canal.  Once I did this, I was able to suction away fragments of wax and debris using suction.  Once the mass was loose enough, the entire plug was pulled from the canal.  The tympanic membrane was intact, no sign of perforation or middle ear effusion.    I turned my attention to the contralateral side once again using the binocular surgical microscope to perform cerumen removal and ear piece tip.  I began by using a cerumen loop to gently lift the edges of the cerumen mass away from the walls of the external canal.  Once I did this, I was able to suction away fragments of wax and debris using suction.  Once the mass was loose enough, the entire plug was  pulled from the canal.  The tympanic membrane was intact, no sign of perforation or middle ear effusion.      A/P - Kathya Breaux is a 75 year old female  (H61.23) Bilateral impacted cerumen  (primary encounter diagnosis)  (H93.8X3) Sensation of fullness in both ears    The patient has had their cerumen and ear piece tips procedurally removed today.  I have discussed ear care at home, including avoiding qtips or any other object placed in the canal.  I have also discussed that over the counter cerumen kits like Debrox or Cerumenex can be useful.    If no other issues, follow up with me in one year for an ear check.

## 2021-10-18 ENCOUNTER — OFFICE VISIT (OUTPATIENT)
Dept: OTOLARYNGOLOGY | Facility: CLINIC | Age: 76
End: 2021-10-18
Payer: COMMERCIAL

## 2021-10-18 VITALS
HEART RATE: 99 BPM | OXYGEN SATURATION: 97 % | DIASTOLIC BLOOD PRESSURE: 78 MMHG | BODY MASS INDEX: 24.8 KG/M2 | SYSTOLIC BLOOD PRESSURE: 132 MMHG | WEIGHT: 140 LBS

## 2021-10-18 DIAGNOSIS — H61.23 BILATERAL IMPACTED CERUMEN: Primary | ICD-10-CM

## 2021-10-18 DIAGNOSIS — H90.3 SENSORINEURAL HEARING LOSS (SNHL) OF BOTH EARS: ICD-10-CM

## 2021-10-18 PROCEDURE — 69210 REMOVE IMPACTED EAR WAX UNI: CPT | Performed by: OTOLARYNGOLOGY

## 2021-10-18 NOTE — NURSING NOTE
"Chief Complaint   Patient presents with     Cerumen Impaction     bilat plugged feeling       Vitals:    10/18/21 1001   BP: 132/78   BP Location: Right arm   Patient Position: Sitting   Cuff Size: Adult Regular   Pulse: 99   SpO2: 97%   Weight: 63.5 kg (140 lb)     Wt Readings from Last 1 Encounters:   10/18/21 63.5 kg (140 lb)     Ht Readings from Last 1 Encounters:   04/16/21 1.6 m (5' 3\")       Margareth Manning [facility], 10/18/2021 10:01 AM    "

## 2021-10-18 NOTE — LETTER
10/18/2021         RE: Kathya Breaux  [address]  [facility] [address]        Dear Colleague,    Thank you for referring your patient, Kathya Breaux, to the [facility]. Please see a copy of my visit note below.    History of Present Illness - Kathya Breaux is a 75 year old female last seen on 4/16/21, for procedural removal of severe cerumen impaction.  She is here for 6 month follow up and ear check.    She reports that her ears have progressively felt stuffy again, especially over the last month,    Otherwise, no ear pain, no blood or purulence from the ears.  She denies any vertigo or headache, but the hearing is stuffy.    Past Medical History -   Patient Active Problem List   Diagnosis     Personal history of DVT (deep vein thrombosis)     Factor V Leiden Heterozygote     CARDIOVASCULAR SCREENING; LDL GOAL LESS THAN 130     Long-term (current) use of anticoagulants [Z79.01]     Posterior vitreous detachment, bilateral     Hx of LASIK     Crohn's disease of both small and large intestine with other complication (H)     Age-related osteoporosis without current pathological fracture       Current Medications -   Current Outpatient Medications:      CALCIUM 500 + D 500-200 MG-IU OR TABS, 1 TABLET DAILY, Disp: , Rfl: 0     denosumab (PROLIA) 60 MG/ML SOSY injection, Inject 1 mL (60 mg) Subcutaneous every 6 months, Disp: 1 mL, Rfl: 1     IMURAN 50 MG OR TABS, 1 TABLET DAILY, Disp: , Rfl:      MULTI-VITAMIN OR TABS, 1 TABLET DAILY, Disp: , Rfl: 0     OMEGA 3-6-9 FATTY ACIDS OR, 1 TABLET DAILY, Disp: , Rfl:      OMEPRAZOLE 20 MG OR CPDR, 1 CAPSULE DAILY, Disp: , Rfl:      VITAMIN B-12 1000 MCG/ML IJ SOLN, 1000 MCG Monthly, Disp: , Rfl: 0     VITAMIN-B COMPLEX OR TABS, 1 tablet daily, Disp: , Rfl:      XARELTO ANTICOAGULANT 20 MG TABS tablet, TAKE 1 TABLET BY MOUTH  DAILY WITH DINNER, Disp: 90 tablet, Rfl: 0    Allergies -   Allergies   Allergen Reactions      Penicillins Hives     Throat closed       Social History -   Social History     Social History     Marital status:      Spouse name: N/A     Number of children: N/A     Years of education: N/A     Social History Main Topics     Smoking status: Former Smoker     Packs/day: 0.50     Years: 38.00     Quit date: 3/1/2008     Smokeless tobacco: Never Used      Comment: 6 cigs per day     Alcohol use No     Drug use: No     Sexual activity: Yes     Partners: Male     Other Topics Concern     Not on file     Social History Narrative    Caffeine intake/servings daily - 2-3    Calcium intake/servings daily - 1-2+ plus 1000mg calcium supp    Exercise 0 times weekly - describe    Sunscreen used - Yes    Seatbelts used - Yes    Guns stored in the home - No    Self Breast Exam - Yes    Pap test up to date -  Yes     Eye exam up to date -  Yes    Dental exam up to date -  Yes    DEXA scan up to date -  2005    Flex Sig/Colonoscopy up to date -  Yes 2006    Mammography up to date -  Yes 4/2006    Immunizations reviewed and up to date - Yes 1998    Abuse: Current or Past (Physical, Sexual or Emotional) - No    Do you feel safe in your environment - Yes    Do you cope well with stress - Yes    Do you suffer from insomnia - No    Last updated by: Char Strauss 3/8/2007       Family History -   Family History   Problem Relation Age of Onset     Heart Disease Father         dysrythmia's     Glaucoma Father      Heart Disease Son         clogged arteries     Cardiovascular Son         sudden heart attack     Circulatory Mother         phelbities     Genetic Disorder Mother         factor V leiden     Unknown/Adopted Maternal Grandmother      Gynecology Maternal Grandfather      Cerebrovascular Disease Paternal Grandmother      Cerebrovascular Disease Paternal Grandfather      Alcohol/Drug Brother         alcohol and drugs     Alcohol/Drug Sister         alcohol and drugs     Breast Cancer Cousin      Colon Cancer Other      Anxiety  Disorder Sister      Substance Abuse Sister      Anxiety Disorder Brother      Substance Abuse Brother        Review of Systems - As per HPI and PMHx, otherwise 10+ system review of the head and neck, and general constitution is negative.    Physical Exam  There were no vitals taken for this visit.    General - The patient is well nourished and well developed, and appears to have good nutritional status.  Alert and oriented to person and place, answers questions and cooperates with examination appropriately.   Head and Face - Normocephalic and atraumatic, with no gross asymmetry noted of the contour of the facial features.  The facial nerve is intact, with strong symmetric movements.  Voice and Breathing - The patient was breathing comfortably without the use of accessory muscles. There was no wheezing, stridor, or stertor.  The patients voice was clear and strong, and had appropriate pitch and quality.  Eyes - Extraocular movements intact, and the pupils were reactive to light.  Sclera were not icteric or injected, conjunctiva were pink and moist.    Cerumen Removal    Physical Exam and Procedure  Ears - On examination of the ears, I found that the BOTH ears were completely impacted with dense cerumen and her hearing aid ear piece tips.  Therefore, I positioned them in the examination chair in a semi-supine position, beginning with the right side.  I used the binocular surgical microscope to perform cerumen removal.  I began by using a cerumen loop to gently lift the edges of the cerumen mass away from the walls of the external canal.  Once I did this, I was able to suction away fragments of wax and debris using suction.  Once the mass was loose enough, the entire plug was pulled from the canal.  The tympanic membrane was intact, no sign of perforation or middle ear effusion.    I turned my attention to the contralateral side once again using the binocular surgical microscope to perform cerumen removal and ear  piece tip.  I began by using a cerumen loop to gently lift the edges of the cerumen mass away from the walls of the external canal.  Once I did this, I was able to suction away fragments of wax and debris using suction.  Once the mass was loose enough, the entire plug was pulled from the canal.  The tympanic membrane was intact, no sign of perforation or middle ear effusion.      A/P - Kathya Breaux is a 75 year old female  (H61.23) Bilateral impacted cerumen  (primary encounter diagnosis)  (H93.8X3) Sensation of fullness in both ears    The patient has had their cerumen and ear piece tips procedurally removed today.  I have discussed ear care at home, including avoiding qtips or any other object placed in the canal.  I have also discussed that over the counter cerumen kits like Debrox or Cerumenex can be useful.    If no other issues, follow up with me in one year for an ear check.          Again, thank you for allowing me to participate in the care of your patient.        Sincerely,        Oscar Moore MD

## 2021-10-29 ENCOUNTER — TRANSFERRED RECORDS (OUTPATIENT)
Dept: HEALTH INFORMATION MANAGEMENT | Facility: CLINIC | Age: 76
End: 2021-10-29
Payer: COMMERCIAL

## 2021-10-29 LAB
ALT SERPL-CCNC: 12 IU/L (ref 0–32)
AST SERPL-CCNC: 21 IU/L (ref 0–40)

## 2021-11-05 ENCOUNTER — TRANSFERRED RECORDS (OUTPATIENT)
Dept: HEALTH INFORMATION MANAGEMENT | Facility: CLINIC | Age: 76
End: 2021-11-05
Payer: COMMERCIAL

## 2021-11-22 ENCOUNTER — OFFICE VISIT (OUTPATIENT)
Dept: FAMILY MEDICINE | Facility: CLINIC | Age: 76
End: 2021-11-22
Payer: COMMERCIAL

## 2021-11-22 VITALS
SYSTOLIC BLOOD PRESSURE: 134 MMHG | HEIGHT: 63 IN | BODY MASS INDEX: 24.41 KG/M2 | WEIGHT: 137.8 LBS | OXYGEN SATURATION: 98 % | HEART RATE: 91 BPM | DIASTOLIC BLOOD PRESSURE: 86 MMHG | TEMPERATURE: 98.1 F

## 2021-11-22 DIAGNOSIS — Z78.0 ASYMPTOMATIC POSTMENOPAUSAL STATUS: ICD-10-CM

## 2021-11-22 DIAGNOSIS — D68.51 FACTOR V LEIDEN MUTATION (H): ICD-10-CM

## 2021-11-22 DIAGNOSIS — Z00.00 ENCOUNTER FOR MEDICARE ANNUAL WELLNESS EXAM: Primary | ICD-10-CM

## 2021-11-22 DIAGNOSIS — Z12.31 ENCOUNTER FOR SCREENING MAMMOGRAM FOR BREAST CANCER: ICD-10-CM

## 2021-11-22 DIAGNOSIS — K50.818 CROHN'S DISEASE OF BOTH SMALL AND LARGE INTESTINE WITH OTHER COMPLICATION (H): ICD-10-CM

## 2021-11-22 PROCEDURE — 99397 PER PM REEVAL EST PAT 65+ YR: CPT | Performed by: NURSE PRACTITIONER

## 2021-11-22 ASSESSMENT — ENCOUNTER SYMPTOMS
PARESTHESIAS: 0
HEMATOCHEZIA: 0
DIZZINESS: 0
FEVER: 0
COUGH: 0
HEARTBURN: 0
CHILLS: 0
CONSTIPATION: 0
DYSURIA: 0
ARTHRALGIAS: 1
WEAKNESS: 0
EYE PAIN: 0
SORE THROAT: 0
HEMATURIA: 0
SHORTNESS OF BREATH: 0
NERVOUS/ANXIOUS: 0
ABDOMINAL PAIN: 0
HEADACHES: 0
PALPITATIONS: 0
NAUSEA: 0
JOINT SWELLING: 0
FREQUENCY: 0
MYALGIAS: 0
DIARRHEA: 0

## 2021-11-22 ASSESSMENT — PAIN SCALES - GENERAL: PAINLEVEL: NO PAIN (0)

## 2021-11-22 ASSESSMENT — MIFFLIN-ST. JEOR: SCORE: 1093.44

## 2021-11-22 ASSESSMENT — ACTIVITIES OF DAILY LIVING (ADL): CURRENT_FUNCTION: NO ASSISTANCE NEEDED

## 2021-11-22 NOTE — PATIENT INSTRUCTIONS
Patient Education   Personalized Prevention Plan  You are due for the preventive services outlined below.  Your care team is available to assist you in scheduling these services.  If you have already completed any of these items, please share that information with your care team to update in your medical record.  Health Maintenance Due   Topic Date Due     ANNUAL REVIEW OF HM ORDERS  Never done     Zoster (Shingles) Vaccine (1 of 2) Never done     LUNG CANCER SCREENING  Never done     FALL RISK ASSESSMENT  11/01/2020     Mammogram  12/01/2021       Understanding USDA MyPlate  The USDA has guidelines to help you make healthy food choices. These are called MyPlate. MyPlate shows the food groups that make up healthy meals using the image of a place setting. Before you eat, think about the healthiest choices for what to put on your plate or in your cup or bowl. To learn more about building a healthy plate, visit www.choosemyplate.gov.    The food groups    Fruits. Any fruit or 100% fruit juice counts as part of the Fruit Group. Fruits may be fresh, canned, frozen, or dried, and may be whole, cut-up, or pureed. Make 1/2 of your plate fruits and vegetables.    Vegetables. Any vegetable or 100% vegetable juice counts as a member of the Vegetable Group. Vegetables may be fresh, frozen, canned, or dried. They can be served raw or cooked and may be whole, cut-up, or mashed. Make 1/2 of your plate fruits and vegetables.    Grains. All foods made from grains are part of the Grains Group. These include wheat, rice, oats, cornmeal, and barley. Grains are often used to make foods such as bread, pasta, oatmeal, cereal, tortillas, and grits. Grains should be no more than 1/4 of your plate. At least half of your grains should be whole grains.    Protein. This group includes meat, poultry, seafood, beans and peas, eggs, processed soy products (such as tofu), nuts (including nut butters), and seeds. Make protein choices no more than  1/4 of your plate. Meat and poultry choices should be lean or low fat.    Dairy. The Dairy Group includes all fluid milk products and foods made from milk that contain calcium, such as yogurt and cheese. (Foods that have little calcium, such as cream, butter, and cream cheese, are not part of this group.) Most dairy choices should be low-fat or fat-free.    Oils. Oils aren't a food group, but they do contain essential nutrients. However it's important to watch your intake of oils. These are fats that are liquid at room temperature. They include canola, corn, olive, soybean, vegetable, and sunflower oil. Foods that are mainly oil include mayonnaise, certain salad dressings, and soft margarines. You likely already get your daily oil allowance from the foods you eat.  Things to limit  Eating healthy also means limiting these things in your diet:       Salt (sodium). Many processed foods have a lot of sodium. To keep sodium intake down, eat fresh vegetables, meats, poultry, and seafood when possible. Purchase low-sodium, reduced-sodium, or no-salt-added food products at the store. And don't add salt to your meals at home. Instead, season them with herbs and spices such as dill, oregano, cumin, and paprika. Or try adding flavor with lemon or lime zest and juice.    Saturated fat. Saturated fats are most often found in animal products such as beef, pork, and chicken. They are often solid at room temperature, such as butter. To reduce your saturated fat intake, choose leaner cuts of meat and poultry. And try healthier cooking methods such as grilling, broiling, roasting, or baking. For a simple lower-fat swap, use plain nonfat yogurt instead of mayonnaise when making potato salad or macaroni salad.    Added sugars. These are sugars added to foods. They are in foods such as ice cream, candy, soda, fruit drinks, sports drinks, energy drinks, cookies, pastries, jams, and syrups. Cut down on added sugars by sharing sweet  treats with a family member or friend. You can also choose fruit for dessert, and drink water or other unsweetened beverages.     [employer] last reviewed this educational content on 6/1/2020 2000-2021 [employer]. All rights reserved. This information is not intended as a substitute for professional medical care. Always follow your healthcare professional's instructions.

## 2021-11-22 NOTE — PROGRESS NOTES
"SUBJECTIVE:   Kathya Breaux is a 75 year old female who presents for Preventive Visit.      Patient has been advised of split billing requirements and indicates understanding: Yes   Are you in the first 12 months of your Medicare coverage?  No    Healthy Habits:     In general, how would you rate your overall health?  Good    Frequency of exercise:  4-5 days/week    Duration of exercise:  15-30 minutes    Do you usually eat at least 4 servings of fruit and vegetables a day, include whole grains    & fiber and avoid regularly eating high fat or \"junk\" foods?  No    Taking medications regularly:  Yes    Medication side effects:  None    Ability to successfully perform activities of daily living:  No assistance needed    Home Safety:  No safety concerns identified    Hearing Impairment:  No hearing concerns    In the past 6 months, have you been bothered by leaking of urine?  No    In general, how would you rate your overall mental or emotional health?  Excellent      PHQ-2 Total Score: 0    Additional concerns today:  No    Never started on prolia.  Previously on fosamax with side effects.  Concerned about potential side effects of meds.    Do you feel safe in your environment? Yes    Have you ever done Advance Care Planning? (For example, a Health Directive, POLST, or a discussion with a medical provider or your loved ones about your wishes): No, advance care planning information given to patient to review.  Patient plans to discuss their wishes with loved ones or provider.         Fall risk  Fallen 2 or more times in the past year?: No  Any fall with injury in the past year?: No    Cognitive Screening   1) Repeat 3 items (Leader, Season, Table)    2) Clock draw: NORMAL  3) 3 item recall: Recalls 2 objects   Results: NORMAL clock, 1-2 items recalled: COGNITIVE IMPAIRMENT LESS LIKELY    Mini-CogTM Copyright S Janae. Licensed by the author for use in [facility]; reprinted with permission " (jaiden@[facility]). All rights reserved.      Do you have sleep apnea, excessive snoring or daytime drowsiness?: no    Reviewed and updated as needed this visit by clinical staff  Tobacco  Allergies  Meds             Reviewed and updated as needed this visit by Provider               Social History     Tobacco Use     Smoking status: Former Smoker     Packs/day: 0.50     Years: 38.00     Pack years: 19.00     Types: Cigarettes     Quit date: 3/1/2008     Years since quittin.7     Smokeless tobacco: Never Used     Tobacco comment: 6 cigs per day   Substance Use Topics     Alcohol use: No         Alcohol Use 2021   Prescreen: >3 drinks/day or >7 drinks/week? Not Applicable   Prescreen: >3 drinks/day or >7 drinks/week? -               Current providers sharing in care for this patient include:   Patient Care Team:  Phyllis Espinosa APRN CNP as PCP - General (Nurse Practitioner)  Candice Mallory RPH as Pharmacist (Pharmacist Ambulatory Care)  Oscar Moore MD as Assigned Surgical Provider  Phyllis Espinosa APRN CNP as Assigned PCP  Genna Moy APRN CNP as Assigned Cancer Care Provider    The following health maintenance items are reviewed in Epic and correct as of today:  Health Maintenance Due   Topic Date Due     ANNUAL REVIEW OF HM ORDERS  Never done     ZOSTER IMMUNIZATION (1 of 2) Never done     LUNG CANCER SCREENING  Never done     MEDICARE ANNUAL WELLNESS VISIT  2020     FALL RISK ASSESSMENT  2020     MAMMO SCREENING  2021     BP Readings from Last 3 Encounters:   21 134/86   10/18/21 132/78   21 126/68    Wt Readings from Last 3 Encounters:   21 62.5 kg (137 lb 12.8 oz)   10/18/21 63.5 kg (140 lb)   21 63.5 kg (140 lb)                  Patient Active Problem List   Diagnosis     Personal history of DVT (deep vein thrombosis)     Factor V Leiden Heterozygote     CARDIOVASCULAR SCREENING; LDL GOAL LESS THAN 130     Long-term (current)  "use of anticoagulants [Z79.01]     Posterior vitreous detachment, bilateral     Hx of LASIK     Crohn's disease of both small and large intestine with other complication (H)     Age-related osteoporosis without current pathological fracture     Past Surgical History:   Procedure Laterality Date     ABDOMEN SURGERY  3 events    Bowel resec. in , bowel resec in      APPENDECTOMY      first bowel surgery     COLONOSCOPY  2016    Q 5 years      ENHANCE LASER REFRACTIVE BILATERAL EXISTING PT IN PARAMETERS       EYE SURGERY      lasik surgery     GYN SURGERY      hysterectomy     ZZC NONSPECIFIC PROCEDURE      3 1/\" sm. intestine removed-illeitis     ZZC NONSPECIFIC PROCEDURE      4\" sm. intestine removed-illeitis     ZZC NONSPECIFIC PROCEDURE      Hysterectomy       Social History     Tobacco Use     Smoking status: Former Smoker     Packs/day: 0.50     Years: 38.00     Pack years: 19.00     Types: Cigarettes     Quit date: 3/1/2008     Years since quittin.7     Smokeless tobacco: Never Used     Tobacco comment: 6 cigs per day   Substance Use Topics     Alcohol use: No     Family History   Problem Relation Age of Onset     Heart Disease Father         dysrythmia's     Glaucoma Father      Heart Disease Son         clogged arteries     Cardiovascular Son         sudden heart attack     Circulatory Mother         phelbities     Genetic Disorder Mother         factor V leiden     Unknown/Adopted Maternal Grandmother      Gynecology Maternal Grandfather      Cerebrovascular Disease Paternal Grandmother      Cerebrovascular Disease Paternal Grandfather      Alcohol/Drug Brother         alcohol and drugs     Alcohol/Drug Sister         alcohol and drugs     Breast Cancer Cousin      Colon Cancer Other      Anxiety Disorder Sister      Substance Abuse Sister      Anxiety Disorder Brother      Substance Abuse Brother          Current Outpatient Medications   Medication Sig Dispense Refill     " "CALCIUM 500 + D 500-200 MG-IU OR TABS 1 TABLET DAILY  0     IMURAN 50 MG OR TABS 1 TABLET DAILY       MULTI-VITAMIN OR TABS 1 TABLET DAILY  0     OMEGA 3-6-9 FATTY ACIDS OR 1 TABLET DAILY       OMEPRAZOLE 20 MG OR CPDR 1 CAPSULE DAILY       VITAMIN B-12 1000 MCG/ML IJ SOLN 1000 MCG Monthly  0     VITAMIN-B COMPLEX OR TABS 1 tablet daily       XARELTO ANTICOAGULANT 20 MG TABS tablet TAKE 1 TABLET BY MOUTH  DAILY WITH DINNER 90 tablet 0     Allergies   Allergen Reactions     Penicillins Hives     Throat closed     Mammogram Screening: Mammogram Screening - Patient over age 75, has elected to continue with screening.      Pertinent mammograms are reviewed under the imaging tab.    Review of Systems   Constitutional: Negative for chills and fever.   HENT: Positive for hearing loss. Negative for congestion, ear pain and sore throat.    Eyes: Negative for pain and visual disturbance.   Respiratory: Negative for cough and shortness of breath.    Cardiovascular: Negative for chest pain, palpitations and peripheral edema.   Gastrointestinal: Negative for abdominal pain, constipation, diarrhea, heartburn, hematochezia and nausea.   Genitourinary: Negative for dysuria, frequency, genital sores, hematuria and urgency.   Musculoskeletal: Positive for arthralgias. Negative for joint swelling and myalgias.   Skin: Negative for rash.   Neurological: Negative for dizziness, weakness, headaches and paresthesias.   Psychiatric/Behavioral: Negative for mood changes. The patient is not nervous/anxious.          OBJECTIVE:   Pulse 91   Temp 98.1  F (36.7  C) (Oral)   Ht 1.607 m (5' 3.27\")   Wt 62.5 kg (137 lb 12.8 oz)   SpO2 98%   BMI 24.20 kg/m   Estimated body mass index is 24.2 kg/m  as calculated from the following:    Height as of this encounter: 1.607 m (5' 3.27\").    Weight as of this encounter: 62.5 kg (137 lb 12.8 oz).  Physical Exam  GENERAL: healthy, alert and no distress  EYES: Eyes grossly normal to inspection, PERRL " "and conjunctivae and sclerae normal  HENT: ear canals and TM's normal, nose and mouth without ulcers or lesions  NECK: no adenopathy, no asymmetry, masses, or scars, thyroid normal to palpation and no carotid bruits  RESP: lungs clear to auscultation - no rales, rhonchi or wheezes  BREAST: normal without masses, tenderness or nipple discharge and no palpable axillary masses or adenopathy  CV: regular rate and rhythm, normal S1 S2, no S3 or S4, no murmur, click or rub, no peripheral edema and peripheral pulses strong  ABDOMEN: soft, nontender, no hepatosplenomegaly, no masses and bowel sounds normal  MS: no gross musculoskeletal defects noted, no edema  NEURO: Normal strength and tone, mentation intact and speech normal  PSYCH: mentation appears normal, affect normal/bright    Diagnostic Test Results:  none     ASSESSMENT / PLAN:   (Z00.00) Encounter for Medicare annual wellness exam  (primary encounter diagnosis)  Comment:   Plan: Lipid panel reflex to direct LDL Fasting,         Glucose            (K50.818) Crohn's disease of both small and large intestine with other complication (H)  Comment: Doing well, follows with GI.  Plan: Vitamin D Deficiency            (D68.51) Factor V Leiden mutation (H)  Comment:   Plan: Follows with hematology.    (Z12.31) Encounter for screening mammogram for breast cancer  Comment:   Plan: *MA Screening Digital Bilateral            (Z78.0) Asymptomatic postmenopausal status  Comment:   Plan: DX Hip/Pelvis/Spine              Patient has been advised of split billing requirements and indicates understanding: Yes  COUNSELING:  Reviewed preventive health counseling, as reflected in patient instructions       Regular exercise       Healthy diet/nutrition    Estimated body mass index is 24.2 kg/m  as calculated from the following:    Height as of this encounter: 1.607 m (5' 3.27\").    Weight as of this encounter: 62.5 kg (137 lb 12.8 oz).        She reports that she quit smoking about 13 " years ago. Her smoking use included cigarettes. She has a 19.00 pack-year smoking history. She has never used smokeless tobacco.      Appropriate preventive services were discussed with this patient, including applicable screening as appropriate for cardiovascular disease, diabetes, osteopenia/osteoporosis, and glaucoma.  As appropriate for age/gender, discussed screening for colorectal cancer, prostate cancer, breast cancer, and cervical cancer. Checklist reviewing preventive services available has been given to the patient.    Reviewed patients plan of care and provided an AVS. The Basic Care Plan (routine screening as documented in Health Maintenance) for Kathya meets the Care Plan requirement. This Care Plan has been established and reviewed with the Patient.    Counseling Resources:  ATP IV Guidelines  Pooled Cohorts Equation Calculator  Breast Cancer Risk Calculator  Breast Cancer: Medication to Reduce Risk  FRAX Risk Assessment  ICSI Preventive Guidelines  Dietary Guidelines for Americans, 2010  USDA's MyPlate  ASA Prophylaxis  Lung CA Screening    JULIETH Goldberg CNP  [facility]    Identified Health Risks:

## 2021-12-28 ENCOUNTER — TELEPHONE (OUTPATIENT)
Dept: AUDIOLOGY | Facility: CLINIC | Age: 76
End: 2021-12-28

## 2021-12-28 DIAGNOSIS — H90.3 SENSORINEURAL HEARING LOSS, BILATERAL: Primary | ICD-10-CM

## 2021-12-28 PROCEDURE — V5267 HEARING AID SUP/ACCESS/DEV: HCPCS | Performed by: AUDIOLOGIST

## 2021-12-28 NOTE — TELEPHONE ENCOUNTER
..Reason for Call:   hearing aide supplies    Detailed comments: Patient is calling to request:  Supplies for hearing aides:  pro wax mini fit filters/a couple of pkgs    A couple of packs, standard ear piece dome - also called  tip, size 6 mm    Mail to home address/charge as necessary    Phone Number Patient can be reached at: Cell number on file:    Telephone Information:   Mobile [phone]       Best Time: anytime    Can we leave a detailed message on this number? YES    Call taken on 12/28/2021 at 8:49 AM by Maggy Lopez

## 2021-12-28 NOTE — TELEPHONE ENCOUNTER
Dispensed two packs of Oticon Pro Wax mini fit wax guards as well as two packs of 6 mm bass double vent domes, which will be mailed to the address on file.    [facility]  [id number] Hearing aid supply x2 (wax guards: $5 each; total $10)    Lara Miller, CCC-A  [place] Licensed Audiologist #[id number]  12/28/2021

## 2022-01-10 ENCOUNTER — ANCILLARY PROCEDURE (OUTPATIENT)
Dept: BONE DENSITY | Facility: CLINIC | Age: 77
End: 2022-01-10
Attending: NURSE PRACTITIONER
Payer: COMMERCIAL

## 2022-01-10 ENCOUNTER — ANCILLARY PROCEDURE (OUTPATIENT)
Dept: MAMMOGRAPHY | Facility: CLINIC | Age: 77
End: 2022-01-10
Attending: NURSE PRACTITIONER
Payer: COMMERCIAL

## 2022-01-10 DIAGNOSIS — Z12.31 ENCOUNTER FOR SCREENING MAMMOGRAM FOR BREAST CANCER: ICD-10-CM

## 2022-01-10 DIAGNOSIS — Z78.0 ASYMPTOMATIC POSTMENOPAUSAL STATUS: ICD-10-CM

## 2022-01-10 PROCEDURE — 77080 DXA BONE DENSITY AXIAL: CPT | Performed by: INTERNAL MEDICINE

## 2022-01-10 PROCEDURE — 77067 SCR MAMMO BI INCL CAD: CPT | Mod: TC | Performed by: RADIOLOGY

## 2022-01-10 NOTE — RESULT ENCOUNTER NOTE
Dear Kathya,    Your recent test results are attached.      Normal mammogram.    If you have any questions please feel free to contact [phone] or myself via [email or web address].    Sincerely,  Katlin Monreal, CNP

## 2022-01-13 NOTE — RESULT ENCOUNTER NOTE
Dear Kathya,    Your recent test results are attached.      Your bone density scan shows osteopenia, or thinning of the bones.  Your risk for fracture is high enough that I would recommend additional treatment with medication to help prevent further bone loss, in addition to weight bearing exercise.  I would like to discuss these medications with you in clinic or virtually, at your convenience.  Please schedule an appointment to discuss treatment.        If you have any questions please feel free to contact [phone] or myself via [email or web address].    Sincerely,  Katlin Monreal, CNP

## 2022-01-21 DIAGNOSIS — D68.51 FACTOR V LEIDEN MUTATION (H): ICD-10-CM

## 2022-01-21 DIAGNOSIS — Z86.718 PERSONAL HISTORY OF DVT (DEEP VEIN THROMBOSIS): ICD-10-CM

## 2022-01-23 RX ORDER — RIVAROXABAN 20 MG/1
TABLET, FILM COATED ORAL
Qty: 90 TABLET | Refills: 3 | Status: SHIPPED | OUTPATIENT
Start: 2022-01-23 | End: 2022-09-28

## 2022-02-10 ENCOUNTER — TRANSFERRED RECORDS (OUTPATIENT)
Dept: HEALTH INFORMATION MANAGEMENT | Facility: CLINIC | Age: 77
End: 2022-02-10
Payer: COMMERCIAL

## 2022-02-10 LAB
ALT SERPL-CCNC: 12 LU/L (ref 0–32)
AST SERPL-CCNC: 22 LU/L (ref 0–40)

## 2022-03-08 ENCOUNTER — TELEPHONE (OUTPATIENT)
Dept: FAMILY MEDICINE | Facility: CLINIC | Age: 77
End: 2022-03-08

## 2022-03-08 DIAGNOSIS — H90.3 SENSORINEURAL HEARING LOSS, BILATERAL: Primary | ICD-10-CM

## 2022-03-08 PROCEDURE — V5267 HEARING AID SUP/ACCESS/DEV: HCPCS | Performed by: AUDIOLOGIST

## 2022-03-08 PROCEDURE — 99207 PR NO CHARGE LOS: CPT | Performed by: AUDIOLOGIST

## 2022-03-08 NOTE — TELEPHONE ENCOUNTER
Reason for Call:  Other     Detailed comments: Pt needs to order some supplies for hearing aides, please call her.     Phone Number Patient can be reached at: Home number on file [phone] (home)    Best Time: anytime    Can we leave a detailed message on this number? NO    Call taken on 3/8/2022 at 9:50 AM by Gisselle Lopez

## 2022-03-08 NOTE — TELEPHONE ENCOUNTER
Patient requested two packs of Oticon Pro Wax mini fit wax guards as well as two packs of 6 mm bass double vent domes, which will be mailed to the address on file.     [facility]  [id number] Hearing aid supply x2 (wax guards: $5 each; total $10)    Wagner Almanza CCC-A  Licensed Audiologist #[id number]  3/8/2022

## 2022-03-14 ENCOUNTER — OFFICE VISIT (OUTPATIENT)
Dept: OPHTHALMOLOGY | Facility: CLINIC | Age: 77
End: 2022-03-14
Payer: COMMERCIAL

## 2022-03-14 DIAGNOSIS — R68.89 SUSPECTED GLAUCOMA OF BOTH EYES: ICD-10-CM

## 2022-03-14 DIAGNOSIS — H43.813 POSTERIOR VITREOUS DETACHMENT, BILATERAL: ICD-10-CM

## 2022-03-14 DIAGNOSIS — H52.4 PRESBYOPIA: ICD-10-CM

## 2022-03-14 DIAGNOSIS — Z98.890 HX OF LASIK: ICD-10-CM

## 2022-03-14 DIAGNOSIS — H25.813 COMBINED FORMS OF AGE-RELATED CATARACT OF BOTH EYES: Primary | ICD-10-CM

## 2022-03-14 PROBLEM — Z01.01 ENCOUNTER FOR EXAMINATION OF EYES AND VISION WITH ABNORMAL FINDINGS: Status: ACTIVE | Noted: 2022-03-14

## 2022-03-14 PROCEDURE — 92004 COMPRE OPH EXAM NEW PT 1/>: CPT | Performed by: OPHTHALMOLOGY

## 2022-03-14 PROCEDURE — 92015 DETERMINE REFRACTIVE STATE: CPT | Performed by: OPHTHALMOLOGY

## 2022-03-14 ASSESSMENT — VISUAL ACUITY
OD_SC+: -2
OD_SC: 20/40
OD_PH_SC+: +2
OD_PH_SC: 20/30
OS_SC: 20/30
OS_SC+: -2
METHOD: SNELLEN - LINEAR

## 2022-03-14 ASSESSMENT — REFRACTION_MANIFEST
OD_ADD: +2.50
OD_SPHERE: -1.25
OD_CYLINDER: +1.50
OS_CYLINDER: +0.75
OS_AXIS: 155
OS_ADD: +2.50
OS_SPHERE: -0.25
OD_AXIS: 005

## 2022-03-14 ASSESSMENT — CUP TO DISC RATIO
OD_RATIO: 0.6
OS_RATIO: 0.6

## 2022-03-14 ASSESSMENT — TONOMETRY
OD_IOP_MMHG: 18
IOP_METHOD: APPLANATION
OS_IOP_MMHG: 19

## 2022-03-14 ASSESSMENT — SLIT LAMP EXAM - LIDS
COMMENTS: 2+ DERMATOCHALASIS - UPPER LID
COMMENTS: 2+ DERMATOCHALASIS - UPPER LID

## 2022-03-14 ASSESSMENT — CONF VISUAL FIELD
OS_INFERIOR_TEMPORAL_RESTRICTION: 3
OD_NORMAL: 1

## 2022-03-14 ASSESSMENT — EXTERNAL EXAM - RIGHT EYE: OD_EXAM: PROLAPSED FAT PADS: UPPER, LOWER

## 2022-03-14 ASSESSMENT — EXTERNAL EXAM - LEFT EYE: OS_EXAM: PROLAPSED FAT PADS: UPPER, LOWER

## 2022-03-14 NOTE — LETTER
"    3/14/2022         RE: Kathya Breaux  [address] [facility] [address]        Dear Colleague,    Thank you for referring your patient, Kathya Breaux, to the [facility]. Please see a copy of my visit note below.     Current Eye Medications:  None     Subjective:  Cataract and glaucoma check.   Pt notes vision is ok and denies problmes/changes. Wears otc readers for near vision. Occasional floaters and no flashes.     Uses +2.50s.  Was moderate/high myope prior to LASIK.     Objective:  See Ophthalmology Exam.       Assessment:  Suspicious discs both eyes in patient with mild cataracts and family history of glaucoma.      ICD-10-CM    1. Combined forms of age-related cataract, mild, of both eyes  H25.813    2. Suspected glaucoma of both eyes  H40.003    3. Hx of LASIK  Z98.890    4. Posterior vitreous detachment, bilateral  H43.813    5. Presbyopia  H52.4 REFRACTIVE STATUS        Plan:  Glasses prescription given - optional  Okay to use OTC readers   Use artificial tears up to four times a day (like Refresh Optive, Systane Balance, TheraTears, or generic artificial tears are ok. Avoid \"get the red out\" drops).  Return visit near future at convenience for an intraocular pressure check, glaucoma OCT, retinal OCT, pachy, and Horta Visual Field.  Maxx Roque M.D.  [phone]             Again, thank you for allowing me to participate in the care of your patient.        Sincerely,        Maxx Roque MD    "

## 2022-03-14 NOTE — PROGRESS NOTES
" Current Eye Medications:  None     Subjective:  Cataract and glaucoma check.   Pt notes vision is ok and denies problmes/changes. Wears otc readers for near vision. Occasional floaters and no flashes.     Uses +2.50s.  Was moderate/high myope prior to LASIK.     Objective:  See Ophthalmology Exam.       Assessment:  Suspicious discs both eyes in patient with mild cataracts and family history of glaucoma.      ICD-10-CM    1. Combined forms of age-related cataract, mild, of both eyes  H25.813    2. Suspected glaucoma of both eyes  H40.003    3. Hx of LASIK  Z98.890    4. Posterior vitreous detachment, bilateral  H43.813    5. Presbyopia  H52.4 REFRACTIVE STATUS        Plan:  Glasses prescription given - optional  Okay to use OTC readers   Use artificial tears up to four times a day (like Refresh Optive, Systane Balance, TheraTears, or generic artificial tears are ok. Avoid \"get the red out\" drops).  Return visit near future at convenience for an intraocular pressure check, glaucoma OCT, retinal OCT, pachy, and Horta Visual Field.  Maxx Roque M.D.  [phone]         "

## 2022-03-14 NOTE — PATIENT INSTRUCTIONS
"Glasses prescription given - optional  Okay to use OTC readers   Use artificial tears up to four times a day (like Refresh Optive, Systane Balance, TheraTears, or generic artificial tears are ok. Avoid \"get the red out\" drops).  Return visit near future at convenience for an intraocular pressure check, glaucoma OCT, retinal OCT, pachy, and Horta Visual Field.  Maxx Roque M.D.  [phone]      "

## 2022-04-18 ENCOUNTER — OFFICE VISIT (OUTPATIENT)
Dept: OPHTHALMOLOGY | Facility: CLINIC | Age: 77
End: 2022-04-18
Payer: COMMERCIAL

## 2022-04-18 DIAGNOSIS — H40.1131 PRIMARY OPEN ANGLE GLAUCOMA (POAG) OF BOTH EYES, MILD STAGE: Primary | ICD-10-CM

## 2022-04-18 DIAGNOSIS — H43.813 POSTERIOR VITREOUS DETACHMENT, BILATERAL: ICD-10-CM

## 2022-04-18 PROBLEM — H40.1130 PRIMARY OPEN ANGLE GLAUCOMA (POAG) OF BOTH EYES: Status: ACTIVE | Noted: 2022-04-18

## 2022-04-18 PROCEDURE — 92083 EXTENDED VISUAL FIELD XM: CPT | Performed by: OPHTHALMOLOGY

## 2022-04-18 PROCEDURE — 92020 GONIOSCOPY: CPT | Performed by: OPHTHALMOLOGY

## 2022-04-18 PROCEDURE — 92133 CPTRZD OPH DX IMG PST SGM ON: CPT | Performed by: OPHTHALMOLOGY

## 2022-04-18 PROCEDURE — 92012 INTRM OPH EXAM EST PATIENT: CPT | Performed by: OPHTHALMOLOGY

## 2022-04-18 RX ORDER — LATANOPROST 50 UG/ML
1 SOLUTION/ DROPS OPHTHALMIC EVERY EVENING
Qty: 7.5 ML | Refills: 4 | Status: SHIPPED | OUTPATIENT
Start: 2022-04-18 | End: 2023-02-20

## 2022-04-18 ASSESSMENT — VISUAL ACUITY
OD_PH_SC: 20/25
OS_SC: 20/30
METHOD: SNELLEN - LINEAR
OD_SC: 20/40
OD_SC+: -1

## 2022-04-18 ASSESSMENT — TONOMETRY
OS_IOP_MMHG: 23
IOP_METHOD: APPLANATION
OD_IOP_MMHG: 23

## 2022-04-18 ASSESSMENT — PACHYMETRY
OS_CT(UM): 472
OD_CT(UM): 468

## 2022-04-18 ASSESSMENT — SLIT LAMP EXAM - LIDS
COMMENTS: 2+ DERMATOCHALASIS - UPPER LID
COMMENTS: 2+ DERMATOCHALASIS - UPPER LID

## 2022-04-18 ASSESSMENT — GONIOSCOPY
OS_INFERIOR: GRADE 3
OS_SUPERIOR: GRADE 3
OD_NASAL: GRADE 2
OS_TEMPORAL: GRADE 1
OS_NASAL: GRADE 2
OD_INFERIOR: GRADE 3
OD_TEMPORAL: GRADE 1
OD_SUPERIOR: GRADE 3

## 2022-04-18 ASSESSMENT — EXTERNAL EXAM - LEFT EYE: OS_EXAM: PROLAPSED FAT PADS: UPPER, LOWER

## 2022-04-18 ASSESSMENT — EXTERNAL EXAM - RIGHT EYE: OD_EXAM: PROLAPSED FAT PADS: UPPER, LOWER

## 2022-04-18 NOTE — PROGRESS NOTES
Current Eye Medications:  Refresh as needed at night     Subjective:  Here for intraocular pressure check, glaucoma OCT, retinal OCT, pachy, and Horta Visual Field. Vision is stable since last visit.      Objective:  See Ophthalmology Exam.       Assessment:  Abnormal glaucoma OCT, retinal OCT, and Horta Visual Field both eyes in patient with probable open angle glaucoma.  Corneas very thin on pachy.  Target 15 both eyes.  Good laser candidate.      Plan:  Start Latanoprost drop both eyes at bedtime.  Return visit in 3 weeks for an intraocular pressure check.  Maxx Roque M.D.  [phone]

## 2022-04-18 NOTE — LETTER
4/18/2022         RE: Kathya Breaux  [address] [place] [facility] [address]        Dear Colleague,    Thank you for referring your patient, Kathya Breaux, to the [facility]. Please see a copy of my visit note below.     Current Eye Medications:  Refresh as needed at night     Subjective:  Here for intraocular pressure check, glaucoma OCT, retinal OCT, pachy, and Horta Visual Field. Vision is stable since last visit.      Objective:  See Ophthalmology Exam.       Assessment:  Abnormal glaucoma OCT, retinal OCT, and Horta Visual Field both eyes in patient with probable open angle glaucoma.  Corneas very thin on pachy.  Target 15 both eyes.  Good laser candidate.      Plan:  Start Latanoprost drop both eyes at bedtime.  Return visit in 3 weeks for an intraocular pressure check.  Maxx Roque M.D.  [phone]       Again, thank you for allowing me to participate in the care of your patient.        Sincerely,        Maxx Roque MD

## 2022-04-18 NOTE — PATIENT INSTRUCTIONS
Start Latanoprost drop both eyes at bedtime.  Return visit in 3 weeks for an intraocular pressure check.  Maxx Roque M.D.  [phone]

## 2022-05-03 ASSESSMENT — ENCOUNTER SYMPTOMS
ARTHRALGIAS: 1
PALPITATIONS: 0
NERVOUS/ANXIOUS: 0
PARESTHESIAS: 0
COUGH: 0
DYSURIA: 0
FEVER: 0
CONSTIPATION: 0
WEAKNESS: 0
SHORTNESS OF BREATH: 0
ABDOMINAL PAIN: 0
DIARRHEA: 0
NAUSEA: 0
HEARTBURN: 0
HEADACHES: 0
HEMATURIA: 0
BREAST MASS: 0
EYE PAIN: 0
FREQUENCY: 0
SORE THROAT: 0
MYALGIAS: 1
DIZZINESS: 0
CHILLS: 0
HEMATOCHEZIA: 0
JOINT SWELLING: 1

## 2022-05-03 ASSESSMENT — ACTIVITIES OF DAILY LIVING (ADL): CURRENT_FUNCTION: NO ASSISTANCE NEEDED

## 2022-05-06 ENCOUNTER — OFFICE VISIT (OUTPATIENT)
Dept: FAMILY MEDICINE | Facility: CLINIC | Age: 77
End: 2022-05-06
Payer: COMMERCIAL

## 2022-05-06 VITALS
BODY MASS INDEX: 23.21 KG/M2 | TEMPERATURE: 98.1 F | OXYGEN SATURATION: 97 % | SYSTOLIC BLOOD PRESSURE: 132 MMHG | DIASTOLIC BLOOD PRESSURE: 82 MMHG | HEART RATE: 89 BPM | HEIGHT: 63 IN | WEIGHT: 131 LBS

## 2022-05-06 DIAGNOSIS — Z13.6 CARDIOVASCULAR SCREENING; LDL GOAL LESS THAN 160: ICD-10-CM

## 2022-05-06 DIAGNOSIS — K50.818 CROHN'S DISEASE OF BOTH SMALL AND LARGE INTESTINE WITH OTHER COMPLICATION (H): ICD-10-CM

## 2022-05-06 DIAGNOSIS — R22.1 MASS OF LEFT SIDE OF NECK: ICD-10-CM

## 2022-05-06 DIAGNOSIS — D68.51 FACTOR V LEIDEN MUTATION (H): ICD-10-CM

## 2022-05-06 DIAGNOSIS — M81.0 AGE-RELATED OSTEOPOROSIS WITHOUT CURRENT PATHOLOGICAL FRACTURE: ICD-10-CM

## 2022-05-06 DIAGNOSIS — Z00.00 ENCOUNTER FOR MEDICARE ANNUAL WELLNESS EXAM: Primary | ICD-10-CM

## 2022-05-06 LAB
ANION GAP SERPL CALCULATED.3IONS-SCNC: 5 MMOL/L (ref 3–14)
BASOPHILS # BLD AUTO: 0 10E3/UL (ref 0–0.2)
BASOPHILS NFR BLD AUTO: 0 %
BUN SERPL-MCNC: 13 MG/DL (ref 7–30)
CALCIUM SERPL-MCNC: 10.1 MG/DL (ref 8.5–10.1)
CHLORIDE BLD-SCNC: 108 MMOL/L (ref 94–109)
CHOLEST SERPL-MCNC: 202 MG/DL
CO2 SERPL-SCNC: 29 MMOL/L (ref 20–32)
CREAT SERPL-MCNC: 0.82 MG/DL (ref 0.52–1.04)
EOSINOPHIL # BLD AUTO: 0.1 10E3/UL (ref 0–0.7)
EOSINOPHIL NFR BLD AUTO: 1 %
ERYTHROCYTE [DISTWIDTH] IN BLOOD BY AUTOMATED COUNT: 14 % (ref 10–15)
FASTING STATUS PATIENT QL REPORTED: YES
GFR SERPL CREATININE-BSD FRML MDRD: 74 ML/MIN/1.73M2
GLUCOSE BLD-MCNC: 99 MG/DL (ref 70–99)
HCT VFR BLD AUTO: 40 % (ref 35–47)
HDLC SERPL-MCNC: 70 MG/DL
HGB BLD-MCNC: 14 G/DL (ref 11.7–15.7)
LDLC SERPL CALC-MCNC: 103 MG/DL
LYMPHOCYTES # BLD AUTO: 1.5 10E3/UL (ref 0.8–5.3)
LYMPHOCYTES NFR BLD AUTO: 20 %
MAGNESIUM SERPL-MCNC: 1.9 MG/DL (ref 1.6–2.3)
MCH RBC QN AUTO: 34.5 PG (ref 26.5–33)
MCHC RBC AUTO-ENTMCNC: 35 G/DL (ref 31.5–36.5)
MCV RBC AUTO: 99 FL (ref 78–100)
MONOCYTES # BLD AUTO: 0.6 10E3/UL (ref 0–1.3)
MONOCYTES NFR BLD AUTO: 8 %
NEUTROPHILS # BLD AUTO: 5.3 10E3/UL (ref 1.6–8.3)
NEUTROPHILS NFR BLD AUTO: 71 %
NONHDLC SERPL-MCNC: 132 MG/DL
PHOSPHATE SERPL-MCNC: 3.2 MG/DL (ref 2.5–4.5)
PLATELET # BLD AUTO: 261 10E3/UL (ref 150–450)
POTASSIUM BLD-SCNC: 4.8 MMOL/L (ref 3.4–5.3)
RBC # BLD AUTO: 4.06 10E6/UL (ref 3.8–5.2)
SODIUM SERPL-SCNC: 142 MMOL/L (ref 133–144)
TRIGL SERPL-MCNC: 144 MG/DL
WBC # BLD AUTO: 7.5 10E3/UL (ref 4–11)

## 2022-05-06 PROCEDURE — 36415 COLL VENOUS BLD VENIPUNCTURE: CPT | Performed by: NURSE PRACTITIONER

## 2022-05-06 PROCEDURE — 99213 OFFICE O/P EST LOW 20 MIN: CPT | Mod: 25 | Performed by: NURSE PRACTITIONER

## 2022-05-06 PROCEDURE — 80048 BASIC METABOLIC PNL TOTAL CA: CPT | Performed by: NURSE PRACTITIONER

## 2022-05-06 PROCEDURE — 85025 COMPLETE CBC W/AUTO DIFF WBC: CPT | Performed by: NURSE PRACTITIONER

## 2022-05-06 PROCEDURE — 99397 PER PM REEVAL EST PAT 65+ YR: CPT | Performed by: NURSE PRACTITIONER

## 2022-05-06 PROCEDURE — 84100 ASSAY OF PHOSPHORUS: CPT | Performed by: NURSE PRACTITIONER

## 2022-05-06 PROCEDURE — 83735 ASSAY OF MAGNESIUM: CPT | Performed by: NURSE PRACTITIONER

## 2022-05-06 PROCEDURE — 80061 LIPID PANEL: CPT | Performed by: NURSE PRACTITIONER

## 2022-05-06 PROCEDURE — 82306 VITAMIN D 25 HYDROXY: CPT | Performed by: NURSE PRACTITIONER

## 2022-05-06 ASSESSMENT — ENCOUNTER SYMPTOMS
FEVER: 0
PARESTHESIAS: 0
JOINT SWELLING: 1
WEAKNESS: 0
BREAST MASS: 0
ARTHRALGIAS: 1
HEADACHES: 0
PALPITATIONS: 0
DYSURIA: 0
COUGH: 0
DIZZINESS: 0
HEMATURIA: 0
HEMATOCHEZIA: 0
CONSTIPATION: 0
MYALGIAS: 1
NAUSEA: 0
SORE THROAT: 0
ABDOMINAL PAIN: 0
EYE PAIN: 0
NERVOUS/ANXIOUS: 0
CHILLS: 0
DIARRHEA: 0
FREQUENCY: 0
HEARTBURN: 0
SHORTNESS OF BREATH: 0

## 2022-05-06 ASSESSMENT — ACTIVITIES OF DAILY LIVING (ADL): CURRENT_FUNCTION: NO ASSISTANCE NEEDED

## 2022-05-06 NOTE — PATIENT INSTRUCTIONS
Ask in the pharmacy about the Shingrix vaccine.    For the osteoporosis, we could try another pill similar to Fosamax called Actonel (Risendronate) taken once weekly or Prolia injections every 6 months in the clinic.    Patient Education   Personalized Prevention Plan  You are due for the preventive services outlined below.  Your care team is available to assist you in scheduling these services.  If you have already completed any of these items, please share that information with your care team to update in your medical record.  Health Maintenance Due   Topic Date Due    Zoster (Shingles) Vaccine (1 of 2) Never done    LUNG CANCER SCREENING  Never done    Diptheria Tetanus Pertussis (DTAP/TDAP/TD) Vaccine (1 - Tdap) 05/31/2013

## 2022-05-06 NOTE — PROGRESS NOTES
"SUBJECTIVE:   Kathya Breaux is a 76 year old female who presents for Preventive Visit.    Patient has been advised of split billing requirements and indicates understanding: Yes     Are you in the first 12 months of your Medicare coverage?  No    Healthy Habits:     In general, how would you rate your overall health?  Good    Frequency of exercise:  2-3 days/week    Duration of exercise:  15-30 minutes    Do you usually eat at least 4 servings of fruit and vegetables a day, include whole grains    & fiber and avoid regularly eating high fat or \"junk\" foods?  Yes    Taking medications regularly:  Yes    Medication side effects:  Not applicable    Ability to successfully perform activities of daily living:  No assistance needed    Home Safety:  No safety concerns identified    Hearing Impairment:  No hearing concerns    In the past 6 months, have you been bothered by leaking of urine?  No    In general, how would you rate your overall mental or emotional health?  Good      PHQ-2 Total Score: 0    Additional concerns today:  No    Do you feel safe in your environment? Yes    Have you ever done Advance Care Planning? (For example, a Health Directive, POLST, or a discussion with a medical provider or your loved ones about your wishes): No, advance care planning information given to patient to review.  Advanced care planning was discussed at today's visit.     Fall risk  Fallen 2 or more times in the past year?: No  Any fall with injury in the past year?: No    Cognitive Screening   1) Repeat 3 items (Leader, Season, Table)    2) Clock draw: NORMAL  3) 3 item recall: Recalls 3 objects  Results: 3 items recalled: COGNITIVE IMPAIRMENT LESS LIKELY    Mini-CogTM Minerva Cardoso. Licensed by the author for use in [facility]; reprinted with permission ([email or web address]). All rights reserved.      Do you have sleep apnea, excessive snoring or daytime drowsiness?: no    Reviewed and updated as needed this visit by " clinical staff   Tobacco  Allergies  Meds                Reviewed and updated as needed this visit by Provider                   Social History     Tobacco Use     Smoking status: Former Smoker     Packs/day: 0.50     Years: 38.00     Pack years: 19.00     Types: Cigarettes     Quit date: 3/1/2008     Years since quittin.1     Smokeless tobacco: Never Used     Tobacco comment: 6 cigs per day   Substance Use Topics     Alcohol use: No       Alcohol Use 5/3/2022   Prescreen: >3 drinks/day or >7 drinks/week? Not Applicable   Prescreen: >3 drinks/day or >7 drinks/week? -         Left neck stiff and tight for months and constant. Heating pad is helpful.     Osteoporosis- Took Fosamax for 1 year and had side effects.    Following with GI for Crohn's.    Current providers sharing in care for this patient include:   Patient Care Team:  Phyllis Espinosa APRN CNP as PCP - General (Nurse Practitioner)  Candice Mallory RPH as Pharmacist (Pharmacist Ambulatory Care)  Oscar Moore MD as Assigned Surgical Provider  Genna Moy APRN CNP as Assigned Cancer Care Provider  Katlin Monreal APRN CNP as Assigned PCP    The following health maintenance items are reviewed in Epic and correct as of today:  Health Maintenance Due   Topic Date Due     ZOSTER IMMUNIZATION (1 of 2) Never done     LUNG CANCER SCREENING  Never done     DTAP/TDAP/TD IMMUNIZATION (1 - Tdap) 2013     Labs reviewed in [facility]  Mammogram Screening: Mammogram Screening - Patient over age 75, has elected to continue with screening.      Pertinent mammograms are reviewed under the imaging tab.    Review of Systems   Constitutional: Negative for chills and fever.   HENT: Positive for hearing loss. Negative for congestion, ear pain and sore throat.    Eyes: Negative for pain and visual disturbance.   Respiratory: Negative for cough and shortness of breath.    Cardiovascular: Negative for chest pain, palpitations and peripheral  "edema.   Gastrointestinal: Negative for abdominal pain, constipation, diarrhea, heartburn, hematochezia and nausea.   Breasts:  Negative for tenderness, breast mass and discharge.   Genitourinary: Negative for dysuria, frequency, genital sores, hematuria, pelvic pain, urgency, vaginal bleeding and vaginal discharge.   Musculoskeletal: Positive for arthralgias, joint swelling and myalgias.   Skin: Negative for rash.   Neurological: Negative for dizziness, weakness, headaches and paresthesias.   Psychiatric/Behavioral: Negative for mood changes. The patient is not nervous/anxious.          OBJECTIVE:   /82 (BP Location: Left arm, Patient Position: Sitting, Cuff Size: Adult Regular)   Pulse 89   Temp 98.1  F (36.7  C) (Oral)   Ht 1.607 m (5' 3.25\")   Wt 59.4 kg (131 lb)   SpO2 97%   BMI 23.02 kg/m   Estimated body mass index is 23.02 kg/m  as calculated from the following:    Height as of this encounter: 1.607 m (5' 3.25\").    Weight as of this encounter: 59.4 kg (131 lb).  Physical Exam  GENERAL: healthy, alert and no distress  EYES: Eyes grossly normal to inspection, PERRL and conjunctivae and sclerae normal  HENT: ear canals and TM's normal, nose and mouth without ulcers or lesions  NECK: Firm, smooth mass left neck- likely adenopathy in posterior cervical chain  RESP: lungs clear to auscultation - no rales, rhonchi or wheezes  CV: regular rate and rhythm, normal S1 S2, no S3 or S4, no murmur, click or rub, no peripheral edema and peripheral pulses strong  ABDOMEN: soft, nontender, no hepatosplenomegaly, no masses and bowel sounds normal  MS: no gross musculoskeletal defects noted, no edema  SKIN: no suspicious lesions or rashes  NEURO: Normal strength and tone, mentation intact and speech normal  PSYCH: mentation appears normal, affect normal/bright    Diagnostic Test Results:  Labs reviewed in Epic  No results found for this or any previous visit (from the past 24 hour(s)).    ASSESSMENT / PLAN: " "  (Z00.00) Encounter for Medicare annual wellness exam  (primary encounter diagnosis)  Plan: Lipid panel reflex to direct LDL Fasting          (R22.1) Mass of left side of neck  Comment: Differentia includes lymphadenopathy- benign vs malignant, muscle tension?  Plan: CT Soft Tissue Neck w Contrast, CBC with         platelets and differential          (K50.818) Crohn's disease of both small and large intestine with other complication (H)  Comment: Following with GI    (M81.0) Age-related osteoporosis without current pathological fracture  Comment: Previously treated with Fosamax and stopped due to adverse effects. Discussed trial of Actonel, Prolia, or Reclast infusion. Patient will consider one of the former options and contact me when ready to start.  Plan: Magnesium, Basic metabolic panel  (Ca, Cl, CO2,        Creat, Gluc, K, Na, BUN), Phosphorus, Vitamin D        Deficiency          (D68.51) Factor V Leiden mutation (H)  Comment: on long term anticoagulation    (Z13.6) CARDIOVASCULAR SCREENING; LDL GOAL LESS THAN 160        COUNSELING:  Reviewed preventive health counseling, as reflected in patient instructions       Regular exercise       Healthy diet/nutrition       Hearing screening       Immunizations    Declined: Zoster due to Cost               Osteoporosis prevention/bone health       Colon cancer screening    Estimated body mass index is 23.02 kg/m  as calculated from the following:    Height as of this encounter: 1.607 m (5' 3.25\").    Weight as of this encounter: 59.4 kg (131 lb).    Weight management plan noted, stable and monitoring    She reports that she quit smoking about 14 years ago. Her smoking use included cigarettes. She has a 19.00 pack-year smoking history. She has never used smokeless tobacco.      Appropriate preventive services were discussed with this patient, including applicable screening as appropriate for cardiovascular disease, diabetes, osteopenia/osteoporosis, and glaucoma.  As " appropriate for age/gender, discussed screening for colorectal cancer, prostate cancer, breast cancer, and cervical cancer. Checklist reviewing preventive services available has been given to the patient.    Reviewed patients plan of care and provided an AVS. The Basic Care Plan (routine screening as documented in Health Maintenance) for Kathya meets the Care Plan requirement. This Care Plan has been established and reviewed with the Patient.    Counseling Resources:  ATP IV Guidelines  Pooled Cohorts Equation Calculator  Breast Cancer Risk Calculator  Breast Cancer: Medication to Reduce Risk  FRAX Risk Assessment  ICSI Preventive Guidelines  Dietary Guidelines for Americans, 2010  USDA's MyPlate  ASA Prophylaxis  Lung CA Screening    JULIETH Chávez CNP  [facility]    Identified Health Risks:

## 2022-05-07 LAB — DEPRECATED CALCIDIOL+CALCIFEROL SERPL-MC: 44 UG/L (ref 20–75)

## 2022-05-09 ENCOUNTER — ANCILLARY PROCEDURE (OUTPATIENT)
Dept: CT IMAGING | Facility: CLINIC | Age: 77
End: 2022-05-09
Attending: NURSE PRACTITIONER
Payer: COMMERCIAL

## 2022-05-09 DIAGNOSIS — R22.1 MASS OF LEFT SIDE OF NECK: ICD-10-CM

## 2022-05-09 PROCEDURE — 70491 CT SOFT TISSUE NECK W/DYE: CPT | Mod: GC | Performed by: RADIOLOGY

## 2022-05-09 RX ORDER — IOPAMIDOL 755 MG/ML
100 INJECTION, SOLUTION INTRAVASCULAR ONCE
Status: COMPLETED | OUTPATIENT
Start: 2022-05-09 | End: 2022-05-09

## 2022-05-09 RX ADMIN — IOPAMIDOL 100 ML: 755 INJECTION, SOLUTION INTRAVASCULAR at 12:03

## 2022-05-11 ENCOUNTER — MYC MEDICAL ADVICE (OUTPATIENT)
Dept: FAMILY MEDICINE | Facility: CLINIC | Age: 77
End: 2022-05-11
Payer: COMMERCIAL

## 2022-05-11 ENCOUNTER — TELEPHONE (OUTPATIENT)
Dept: FAMILY MEDICINE | Facility: CLINIC | Age: 77
End: 2022-05-11
Payer: COMMERCIAL

## 2022-05-11 DIAGNOSIS — M54.2 CERVICALGIA: Primary | ICD-10-CM

## 2022-05-11 NOTE — TELEPHONE ENCOUNTER
Anya Urena RN   5/11/2022 11:30 AM CDT Back to Top        Attempted to call pt on her cell #, someone answered but did not respond to writer. No consent on file to speak with  who's number is listed as the primary number. Will need to try and call back.     Anya DAWN RN, BSN  [facility]    JULIETH Chávez CNP   5/11/2022 10:25 AM CDT         Please call if patient does not view results.  Phyllis Espinosa CNP            Patient Communication     Edit Comments   Add Notifications  Back to Top      Jl Rasheed,   Here are your recent results.      Labs look ok. Your cholesterol is mildly elevated and places you at Moderate risk for heart attack o ...   Written by JULIETH Chávez CNP on 5/11/2022 10:25 AM CDT View Full Comments

## 2022-05-16 ENCOUNTER — OFFICE VISIT (OUTPATIENT)
Dept: OPHTHALMOLOGY | Facility: CLINIC | Age: 77
End: 2022-05-16
Payer: COMMERCIAL

## 2022-05-16 DIAGNOSIS — H40.1131 PRIMARY OPEN ANGLE GLAUCOMA (POAG) OF BOTH EYES, MILD STAGE: Primary | ICD-10-CM

## 2022-05-16 PROCEDURE — 92012 INTRM OPH EXAM EST PATIENT: CPT | Performed by: OPHTHALMOLOGY

## 2022-05-16 ASSESSMENT — VISUAL ACUITY
METHOD: SNELLEN - LINEAR
OD_SC: 20/40
OS_SC: 20/30
OD_SC+: +2

## 2022-05-16 ASSESSMENT — TONOMETRY
OD_IOP_MMHG: 15
IOP_METHOD: APPLANATION
OS_IOP_MMHG: 14

## 2022-05-16 ASSESSMENT — SLIT LAMP EXAM - LIDS
COMMENTS: 2+ DERMATOCHALASIS - UPPER LID
COMMENTS: 2+ DERMATOCHALASIS - UPPER LID

## 2022-05-16 ASSESSMENT — EXTERNAL EXAM - LEFT EYE: OS_EXAM: PROLAPSED FAT PADS: UPPER, LOWER

## 2022-05-16 ASSESSMENT — EXTERNAL EXAM - RIGHT EYE: OD_EXAM: PROLAPSED FAT PADS: UPPER, LOWER

## 2022-05-16 NOTE — PATIENT INSTRUCTIONS
"Continue same medication.  Use artificial tears up to four times a day (like Refresh Optive, Systane Balance, TheraTears, or generic artificial tears are ok. Avoid \"get the red out\" drops).   Try nasolacrimal sac occlusion for one minute after instilling drops.  Wait to use artifical tears 5-10 minutes after Latanoprost drop.  Return visit in 4 months for an intraocular pressure check.  Maxx Roque M.D.  [phone]   "

## 2022-05-16 NOTE — PROGRESS NOTES
" Current Eye Medications:  Latanoprost both eyes every evening.  Last drops:  9pm.     Subjective:  Follow up Open Angle Glaucoma.  Patient is here for a pressure check, following the addition of Latanoprost.  She complains of a frankie feeling in each eye, for a short time, after using Latanoprost, but it resolves spontaneously.    Also notes some phlegm accumulation after drops.       Objective:  See Ophthalmology Exam.       Assessment:  Good initial decrease in intraocular pressure both eyes in patient with open angle glaucoma.      Plan:  Continue same medication.  Use artificial tears up to four times a day (like Refresh Optive, Systane Balance, TheraTears, or generic artificial tears are ok. Avoid \"get the red out\" drops).   Try nasolacrimal sac occlusion for one minute after instilling drops.  Wait to use artifical tears 5-10 minutes after Latanoprost drop.  Return visit in 4 months for an intraocular pressure check.  Maxx Roque M.D.  [phone]      "

## 2022-05-27 ENCOUNTER — THERAPY VISIT (OUTPATIENT)
Dept: PHYSICAL THERAPY | Facility: CLINIC | Age: 77
End: 2022-05-27
Attending: NURSE PRACTITIONER
Payer: COMMERCIAL

## 2022-05-27 DIAGNOSIS — M54.2 CERVICALGIA: ICD-10-CM

## 2022-05-27 DIAGNOSIS — M54.2 CERVICAL SPINE PAIN: ICD-10-CM

## 2022-05-27 PROCEDURE — 97161 PT EVAL LOW COMPLEX 20 MIN: CPT | Mod: GP | Performed by: PHYSICAL THERAPIST

## 2022-05-27 PROCEDURE — 97110 THERAPEUTIC EXERCISES: CPT | Mod: GP | Performed by: PHYSICAL THERAPIST

## 2022-05-27 NOTE — PROGRESS NOTES
[facility]    OUTPATIENT Physical Therapy ORTHOPEDIC EVALUATION  PLAN OF TREATMENT FOR OUTPATIENT REHABILITATION  (COMPLETE FOR INITIAL CLAIMS ONLY)  Patient's Last Name, First Name, M.I.  YOB: 1945  Kathya Breaux    Provider s Name:  [facility]   Medical Record No.  [id number]   Start of Care Date:  05/27/22   Onset Date:   11/27/21   Type:     _X__PT   ___OT Medical Diagnosis:    Encounter Diagnosis   Name Primary?    Cervicalgia         Treatment Diagnosis:  Cervical spine pain        Goals:     05/27/22 0500   Body Part   Goals listed below are for Cervical spine   Goal #1   Goal #1 driving/transportation   Previous Functional Level No restrictions   Current Functional Level Unable to rotate neck to look over shoulders   Performance Level without pain   STG Target Performance Able to look over either shoulder    Performance Level with 2/10 pain   Rationale for safe driving   Due date 06/17/22   LTG Target Performance Able to look over either shoulder    Performance Level with 1/10 pain   Rationale for safe driving   Due date 07/22/22       Therapy Frequency:  1 time per week  Predicted Duration of Therapy Intervention:  8 weeks    Franki Gomes, PT                 I CERTIFY THE NEED FOR THESE SERVICES FURNISHED UNDER        THIS PLAN OF TREATMENT AND WHILE UNDER MY CARE     (Physician attestation of this document indicates review and certification of the therapy plan).                     Certification Date From:  05/27/22   Certification Date To:  07/22/22    Referring Provider:  Phyllis Espinosa    Initial Assessment        See Epic Evaluation SOC Date: 05/27/22

## 2022-05-27 NOTE — PROGRESS NOTES
Physical Therapy Initial Evaluation  Subjective:  The history is provided by the patient.   Patient Health History  Kathya Breaux being seen for Neck pain.     Problem began: 5/26/2022.   Problem occurred: Don t know   Pain is reported as 10/10 on pain scale.  General health as reported by patient is good.       Medical allergies: other. Other medical allergies details: Penicillin.   Surgeries include:  Other. Other surgery history details: Bowel resection (twice), hysterectomy.    Current medications:  Other. Other medications details: Xarelto, Azatheiprine, Omeprazole.    Current occupation is Retired.   Primary job tasks include:  Driving and lifting/carrying.                  Therapist Generated HPI Evaluation  Problem details: Minimal pain in the morning and it gets worse as the day goes on. Has had constant pain for 6 months. No injury. Seems to be worse on days she works as an . Gets pain into the left shoulder blade and into the neck. .         Type of problem:  Cervical spine.      Condition occurred with:  Insidious onset.  Where condition occurred: for unknown reasons.  Patient reports pain:  Cervical left side.  and is constant.  Pain radiates to:  Head (Shoulder blade on left). Pain is worse in the P.M..  Since onset symptoms are gradually worsening.  Symptoms are exacerbated by driving and rotating head  and relieved by heat.  Special tests included:  CT scan.  Previous treatment includes chiropractic. There was none improvement following previous treatment.  Restrictions due to condition include:  Working in normal job without restrictions.  Barriers include:  None as reported by patient.                        Objective:  System              Cervical/Thoracic Evaluation    AROM:  AROM Cervical:    Flexion:            58  Extension:       60  Rotation:         Left: 48     Right: 60  Side Bend:      Left: 28     Right:  30        Cervical Myotomes:   normal                      Cervical Dermatomes:  normal                    Cervical Palpation:  : Positive Spurling / Negative Cervical spine distraction.  Tenderness present at Left:    Upper Trap; Levator and Erector Spinae      Cervical Stability/Joint Clearing:  Stability/joint clearing spine: Negative Transverse ligament.      Negative:ALAR Ligament  Spinal Segmental Conclusions:  Hypomobility of C4-C6                                                  General     ROS    Assessment/Plan:    Patient is a 76 year old female with cervical complaints.    Patient has the following significant findings with corresponding treatment plan.                Diagnosis 1:  Cervical spine pain  Pain -  manual therapy, self management, education, directional preference exercise and home program  Decreased ROM/flexibility - manual therapy, therapeutic exercise, therapeutic activity and home program  Decreased joint mobility - manual therapy, therapeutic exercise, therapeutic activity and home program  Decreased strength - therapeutic exercise, therapeutic activities and home program  Decreased function - therapeutic activities and home program    Therapy Evaluation Codes:     Cumulative Therapy Evaluation is: Low complexity.    Previous and current functional limitations:  (See Goal Flow Sheet for this information)    Short term and Long term goals: (See Goal Flow Sheet for this information)     Communication ability:  Patient appears to be able to clearly communicate and understand verbal and written communication and follow directions correctly.  Treatment Explanation - The following has been discussed with the patient:   RX ordered/plan of care  Anticipated outcomes  Possible risks and side effects  This patient would benefit from PT intervention to resume normal activities.   Rehab potential is good.    Frequency:  1 X week, once daily  Duration:  for 8 weeks  Discharge Plan:  Achieve all LTG.  Independent in home treatment  program.  Reach maximal therapeutic benefit.    Please refer to the daily flowsheet for treatment today, total treatment time and time spent performing 1:1 timed codes.

## 2022-06-03 ENCOUNTER — TRANSFERRED RECORDS (OUTPATIENT)
Dept: ONCOLOGY | Facility: CLINIC | Age: 77
End: 2022-06-03
Payer: COMMERCIAL

## 2022-06-03 LAB
ALT SERPL-CCNC: 12 IU/L (ref 0–32)
AST SERPL-CCNC: 21 IU/L (ref 0–40)

## 2022-06-14 ENCOUNTER — THERAPY VISIT (OUTPATIENT)
Dept: PHYSICAL THERAPY | Facility: CLINIC | Age: 77
End: 2022-06-14
Payer: COMMERCIAL

## 2022-06-14 DIAGNOSIS — M54.2 CERVICAL SPINE PAIN: Primary | ICD-10-CM

## 2022-06-14 PROCEDURE — 97110 THERAPEUTIC EXERCISES: CPT | Mod: GP | Performed by: PHYSICAL THERAPIST

## 2022-06-14 PROCEDURE — 97140 MANUAL THERAPY 1/> REGIONS: CPT | Mod: GP | Performed by: PHYSICAL THERAPIST

## 2022-06-20 ENCOUNTER — THERAPY VISIT (OUTPATIENT)
Dept: PHYSICAL THERAPY | Facility: CLINIC | Age: 77
End: 2022-06-20
Payer: COMMERCIAL

## 2022-06-20 DIAGNOSIS — M54.2 CERVICAL SPINE PAIN: Primary | ICD-10-CM

## 2022-06-20 PROCEDURE — 97110 THERAPEUTIC EXERCISES: CPT | Mod: GP | Performed by: PHYSICAL THERAPIST

## 2022-06-20 PROCEDURE — 97140 MANUAL THERAPY 1/> REGIONS: CPT | Mod: GP | Performed by: PHYSICAL THERAPIST

## 2022-06-22 NOTE — PROGRESS NOTES
History of Present Illness - Kathya Breaux is a 76 year old female last seen on 10/18//21, for procedural removal of severe cerumen impaction.  She is here for 6 month follow up and ear check.    She reports that her ears have progressively felt stuffy again, especially over the last month,  Otherwise, no ear pain, no blood or purulence from the ears.  She denies any vertigo or headache, but the hearing is stuffy.    Past Medical History -   Patient Active Problem List   Diagnosis     Personal history of DVT (deep vein thrombosis)     Factor V Leiden Heterozygote     CARDIOVASCULAR SCREENING; LDL GOAL LESS THAN 130     Long-term (current) use of anticoagulants [Z79.01]     Posterior vitreous detachment, bilateral     Hx of LASIK     Crohn's disease of both small and large intestine with other complication (H)     Age-related osteoporosis without current pathological fracture     Combined forms of age-related cataract, mild, of both eyes     Primary open angle glaucoma (POAG) of both eyes     Cervical spine pain       Current Medications -   Current Outpatient Medications:      CALCIUM 500 + D 500-200 MG-IU OR TABS, 1 TABLET DAILY, Disp: , Rfl: 0     IMURAN 50 MG OR TABS, 1 TABLET DAILY, Disp: , Rfl:      latanoprost (XALATAN) 0.005 % ophthalmic solution, Place 1 drop into both eyes every evening, Disp: 7.5 mL, Rfl: 4     MULTI-VITAMIN OR TABS, 1 TABLET DAILY, Disp: , Rfl: 0     OMEGA 3-6-9 FATTY ACIDS OR, 1 TABLET DAILY, Disp: , Rfl:      OMEPRAZOLE 20 MG OR CPDR, 1 CAPSULE DAILY, Disp: , Rfl:      VITAMIN B-12 1000 MCG/ML IJ SOLN, 1000 MCG Monthly, Disp: , Rfl: 0     VITAMIN-B COMPLEX OR TABS, 1 tablet daily, Disp: , Rfl:      XARELTO ANTICOAGULANT 20 MG TABS tablet, TAKE 1 TABLET BY MOUTH  DAILY WITH DINNER, Disp: 90 tablet, Rfl: 3    Allergies -   Allergies   Allergen Reactions     Penicillins Hives     Throat closed       Social History -   Social History     Social History     Marital status:       Spouse name: N/A     Number of children: N/A     Years of education: N/A     Social History Main Topics     Smoking status: Former Smoker     Packs/day: 0.50     Years: 38.00     Quit date: 3/1/2008     Smokeless tobacco: Never Used      Comment: 6 cigs per day     Alcohol use No     Drug use: No     Sexual activity: Yes     Partners: Male     Other Topics Concern     Not on file     Social History Narrative    Caffeine intake/servings daily - 2-3    Calcium intake/servings daily - 1-2+ plus 1000mg calcium supp    Exercise 0 times weekly - describe    Sunscreen used - Yes    Seatbelts used - Yes    Guns stored in the home - No    Self Breast Exam - Yes    Pap test up to date -  Yes     Eye exam up to date -  Yes    Dental exam up to date -  Yes    DEXA scan up to date -  2005    Flex Sig/Colonoscopy up to date -  Yes 2006    Mammography up to date -  Yes 4/2006    Immunizations reviewed and up to date - Yes 1998    Abuse: Current or Past (Physical, Sexual or Emotional) - No    Do you feel safe in your environment - Yes    Do you cope well with stress - Yes    Do you suffer from insomnia - No    Last updated by: Char Strauss 3/8/2007       Family History -   Family History   Problem Relation Age of Onset     Heart Disease Father         dysrythmia's     Glaucoma Father      Heart Disease Son         clogged arteries     Cardiovascular Son         sudden heart attack     Circulatory Mother         phelbities     Genetic Disorder Mother         factor V leiden     Unknown/Adopted Maternal Grandmother      Gynecology Maternal Grandfather      Cerebrovascular Disease Paternal Grandmother      Cerebrovascular Disease Paternal Grandfather      Alcohol/Drug Brother         alcohol and drugs     Alcohol/Drug Sister         alcohol and drugs     Breast Cancer Cousin      Colon Cancer Other      Anxiety Disorder Sister      Substance Abuse Sister      Anxiety Disorder Brother      Substance Abuse Brother        Review of  Systems - As per HPI and PMHx, otherwise 10+ system review of the head and neck, and general constitution is negative.    Physical Exam  /84 (BP Location: Right arm, Patient Position: Sitting, Cuff Size: Adult Regular)   Pulse 92   Wt 59.4 kg (131 lb)   SpO2 96%   BMI 23.02 kg/m      General - The patient is well nourished and well developed, and appears to have good nutritional status.  Alert and oriented to person and place, answers questions and cooperates with examination appropriately.   Head and Face - Normocephalic and atraumatic, with no gross asymmetry noted of the contour of the facial features.  The facial nerve is intact, with strong symmetric movements.  Voice and Breathing - The patient was breathing comfortably without the use of accessory muscles. There was no wheezing, stridor, or stertor.  The patients voice was clear and strong, and had appropriate pitch and quality.  Eyes - Extraocular movements intact, and the pupils were reactive to light.  Sclera were not icteric or injected, conjunctiva were pink and moist.    Cerumen Removal    Physical Exam and Procedure  Ears - On examination of the ears, I found that the BOTH ears were completely impacted with dense cerumen and her hearing aid ear piece tips.  Therefore, I positioned them in the examination chair in a semi-supine position, beginning with the right side.  I used the binocular surgical microscope to perform cerumen removal.  I began by using a cerumen loop to gently lift the edges of the cerumen mass away from the walls of the external canal.  Once I did this, I was able to suction away fragments of wax and debris using suction.  Once the mass was loose enough, the entire plug was pulled from the canal.  The tympanic membrane was intact, no sign of perforation or middle ear effusion.    I turned my attention to the contralateral side once again using the binocular surgical microscope to perform cerumen removal and ear piece tip.   I began by using a cerumen loop to gently lift the edges of the cerumen mass away from the walls of the external canal.  Once I did this, I was able to suction away fragments of wax and debris using suction.  Once the mass was loose enough, the entire plug was pulled from the canal.  The tympanic membrane was intact, no sign of perforation or middle ear effusion.      A/P - Kathya Breaux is a 76 year old female  (H61.23) Bilateral impacted cerumen  (primary encounter diagnosis)  (H93.8X3) Sensation of fullness in both ears    The patient has had their cerumen and ear piece tips procedurally removed today.  I have discussed ear care at home, including avoiding qtips or any other object placed in the canal.  I have also discussed that over the counter cerumen kits like Debrox or Cerumenex can be useful.    If no other issues, follow up with me in one year for an ear check.

## 2022-06-27 ENCOUNTER — OFFICE VISIT (OUTPATIENT)
Dept: OTOLARYNGOLOGY | Facility: CLINIC | Age: 77
End: 2022-06-27
Payer: COMMERCIAL

## 2022-06-27 VITALS
BODY MASS INDEX: 23.02 KG/M2 | HEART RATE: 92 BPM | WEIGHT: 131 LBS | SYSTOLIC BLOOD PRESSURE: 136 MMHG | DIASTOLIC BLOOD PRESSURE: 84 MMHG | OXYGEN SATURATION: 96 %

## 2022-06-27 DIAGNOSIS — H61.23 BILATERAL IMPACTED CERUMEN: Primary | ICD-10-CM

## 2022-06-27 DIAGNOSIS — H90.3 SENSORINEURAL HEARING LOSS (SNHL) OF BOTH EARS: ICD-10-CM

## 2022-06-27 PROCEDURE — 99213 OFFICE O/P EST LOW 20 MIN: CPT | Mod: 25 | Performed by: OTOLARYNGOLOGY

## 2022-06-27 PROCEDURE — 69210 REMOVE IMPACTED EAR WAX UNI: CPT | Performed by: OTOLARYNGOLOGY

## 2022-06-27 NOTE — LETTER
6/27/2022         RE: Kathya Breaux  [address]  [facility] [address]        Dear Colleague,    Thank you for referring your patient, Kathya Breaux, to the [facility]. Please see a copy of my visit note below.    History of Present Illness - Kathya Breaux is a 76 year old female last seen on 10/18//21, for procedural removal of severe cerumen impaction.  She is here for 6 month follow up and ear check.    She reports that her ears have progressively felt stuffy again, especially over the last month,  Otherwise, no ear pain, no blood or purulence from the ears.  She denies any vertigo or headache, but the hearing is stuffy.    Past Medical History -   Patient Active Problem List   Diagnosis     Personal history of DVT (deep vein thrombosis)     Factor V Leiden Heterozygote     CARDIOVASCULAR SCREENING; LDL GOAL LESS THAN 130     Long-term (current) use of anticoagulants [Z79.01]     Posterior vitreous detachment, bilateral     Hx of LASIK     Crohn's disease of both small and large intestine with other complication (H)     Age-related osteoporosis without current pathological fracture     Combined forms of age-related cataract, mild, of both eyes     Primary open angle glaucoma (POAG) of both eyes     Cervical spine pain       Current Medications -   Current Outpatient Medications:      CALCIUM 500 + D 500-200 MG-IU OR TABS, 1 TABLET DAILY, Disp: , Rfl: 0     IMURAN 50 MG OR TABS, 1 TABLET DAILY, Disp: , Rfl:      latanoprost (XALATAN) 0.005 % ophthalmic solution, Place 1 drop into both eyes every evening, Disp: 7.5 mL, Rfl: 4     MULTI-VITAMIN OR TABS, 1 TABLET DAILY, Disp: , Rfl: 0     OMEGA 3-6-9 FATTY ACIDS OR, 1 TABLET DAILY, Disp: , Rfl:      OMEPRAZOLE 20 MG OR CPDR, 1 CAPSULE DAILY, Disp: , Rfl:      VITAMIN B-12 1000 MCG/ML IJ SOLN, 1000 MCG Monthly, Disp: , Rfl: 0     VITAMIN-B COMPLEX OR TABS, 1 tablet daily, Disp: , Rfl:      XARELTO ANTICOAGULANT 20  MG TABS tablet, TAKE 1 TABLET BY MOUTH  DAILY WITH DINNER, Disp: 90 tablet, Rfl: 3    Allergies -   Allergies   Allergen Reactions     Penicillins Hives     Throat closed       Social History -   Social History     Social History     Marital status:      Spouse name: N/A     Number of children: N/A     Years of education: N/A     Social History Main Topics     Smoking status: Former Smoker     Packs/day: 0.50     Years: 38.00     Quit date: 3/1/2008     Smokeless tobacco: Never Used      Comment: 6 cigs per day     Alcohol use No     Drug use: No     Sexual activity: Yes     Partners: Male     Other Topics Concern     Not on file     Social History Narrative    Caffeine intake/servings daily - 2-3    Calcium intake/servings daily - 1-2+ plus 1000mg calcium supp    Exercise 0 times weekly - describe    Sunscreen used - Yes    Seatbelts used - Yes    Guns stored in the home - No    Self Breast Exam - Yes    Pap test up to date -  Yes     Eye exam up to date -  Yes    Dental exam up to date -  Yes    DEXA scan up to date -  2005    Flex Sig/Colonoscopy up to date -  Yes 2006    Mammography up to date -  Yes 4/2006    Immunizations reviewed and up to date - Yes 1998    Abuse: Current or Past (Physical, Sexual or Emotional) - No    Do you feel safe in your environment - Yes    Do you cope well with stress - Yes    Do you suffer from insomnia - No    Last updated by: Char Strauss 3/8/2007       Family History -   Family History   Problem Relation Age of Onset     Heart Disease Father         dysrythmia's     Glaucoma Father      Heart Disease Son         clogged arteries     Cardiovascular Son         sudden heart attack     Circulatory Mother         phelbities     Genetic Disorder Mother         factor V leiden     Unknown/Adopted Maternal Grandmother      Gynecology Maternal Grandfather      Cerebrovascular Disease Paternal Grandmother      Cerebrovascular Disease Paternal Grandfather      Alcohol/Drug Brother          alcohol and drugs     Alcohol/Drug Sister         alcohol and drugs     Breast Cancer Cousin      Colon Cancer Other      Anxiety Disorder Sister      Substance Abuse Sister      Anxiety Disorder Brother      Substance Abuse Brother        Review of Systems - As per HPI and PMHx, otherwise 10+ system review of the head and neck, and general constitution is negative.    Physical Exam  /84 (BP Location: Right arm, Patient Position: Sitting, Cuff Size: Adult Regular)   Pulse 92   Wt 59.4 kg (131 lb)   SpO2 96%   BMI 23.02 kg/m      General - The patient is well nourished and well developed, and appears to have good nutritional status.  Alert and oriented to person and place, answers questions and cooperates with examination appropriately.   Head and Face - Normocephalic and atraumatic, with no gross asymmetry noted of the contour of the facial features.  The facial nerve is intact, with strong symmetric movements.  Voice and Breathing - The patient was breathing comfortably without the use of accessory muscles. There was no wheezing, stridor, or stertor.  The patients voice was clear and strong, and had appropriate pitch and quality.  Eyes - Extraocular movements intact, and the pupils were reactive to light.  Sclera were not icteric or injected, conjunctiva were pink and moist.    Cerumen Removal    Physical Exam and Procedure  Ears - On examination of the ears, I found that the BOTH ears were completely impacted with dense cerumen and her hearing aid ear piece tips.  Therefore, I positioned them in the examination chair in a semi-supine position, beginning with the right side.  I used the binocular surgical microscope to perform cerumen removal.  I began by using a cerumen loop to gently lift the edges of the cerumen mass away from the walls of the external canal.  Once I did this, I was able to suction away fragments of wax and debris using suction.  Once the mass was loose enough, the entire plug  was pulled from the canal.  The tympanic membrane was intact, no sign of perforation or middle ear effusion.    I turned my attention to the contralateral side once again using the binocular surgical microscope to perform cerumen removal and ear piece tip.  I began by using a cerumen loop to gently lift the edges of the cerumen mass away from the walls of the external canal.  Once I did this, I was able to suction away fragments of wax and debris using suction.  Once the mass was loose enough, the entire plug was pulled from the canal.  The tympanic membrane was intact, no sign of perforation or middle ear effusion.      A/P - Kathya Breaux is a 76 year old female  (H61.23) Bilateral impacted cerumen  (primary encounter diagnosis)  (H93.8X3) Sensation of fullness in both ears    The patient has had their cerumen and ear piece tips procedurally removed today.  I have discussed ear care at home, including avoiding qtips or any other object placed in the canal.  I have also discussed that over the counter cerumen kits like Debrox or Cerumenex can be useful.    If no other issues, follow up with me in one year for an ear check.          Again, thank you for allowing me to participate in the care of your patient.        Sincerely,        Oscar Moore MD

## 2022-06-27 NOTE — NURSING NOTE
"Chief Complaint   Patient presents with     Cerumen Impaction       Vitals:    06/27/22 0904   BP: 136/84   BP Location: Right arm   Patient Position: Sitting   Cuff Size: Adult Regular   Pulse: 92   SpO2: 96%   Weight: 59.4 kg (131 lb)     Wt Readings from Last 1 Encounters:   06/27/22 59.4 kg (131 lb)     Ht Readings from Last 1 Encounters:   05/06/22 1.607 m (5' 3.25\")       Margareth Manning [facility], 6/27/2022 9:05 AM    "

## 2022-07-30 ENCOUNTER — HEALTH MAINTENANCE LETTER (OUTPATIENT)
Age: 77
End: 2022-07-30

## 2022-08-24 PROBLEM — M54.2 CERVICAL SPINE PAIN: Status: RESOLVED | Noted: 2022-05-27 | Resolved: 2022-08-24

## 2022-08-24 NOTE — PROGRESS NOTES
DISCHARGE REPORT    Progress reporting period is from 5/27/2022 to 6/20/2022.       SUBJECTIVE  Subjective: New exercises are going well, no pain. Things are about the same as last week.    Current Pain level: 0/10.     Initial Pain level: 2/10.   Changes in function:  Yes (See Goal flowsheet attached for changes in current functional level)  Adverse reaction to treatment or activity: None    OBJECTIVE  Changes noted in objective findings:  The objective findings below are from DOS 6/20/2022.    Palpation - Hypertonicity of upper trapezius and cervicals paraspinals B  Observation - Cueing for proper chin tuck technique to avoid cervical spine flexion     ASSESSMENT/PLAN  Updated problem list and treatment plan: Diagnosis 1:  Cervical spine pain  STG/LTGs have been met or progress has been made towards goals:  Yes (See Goal flow sheet completed today.)  Assessment of Progress: The patient's condition is improving.  Self Management Plans:  Patient has been instructed in a home treatment program.  Patient is independent in a home treatment program.  I have re-evaluated this patient and find that the nature, scope, duration and intensity of the therapy is appropriate for the medical condition of the patient.    Recommendations:  The patient has not returned to physical therapy in over 1 month and it is assumed that she has done well independently. Pt will be discharged to [facility] at this time.    Please refer to the daily flowsheet for treatment today, total treatment time and time spent performing 1:1 timed codes.

## 2022-08-30 ENCOUNTER — TELEPHONE (OUTPATIENT)
Dept: OTOLARYNGOLOGY | Facility: CLINIC | Age: 77
End: 2022-08-30

## 2022-08-30 NOTE — TELEPHONE ENCOUNTER
[facility] Call Center    Phone Message    May a detailed message be left on voicemail: yes     Reason for Call: Other: The pt needs to order 2 paks of Mini fit pro wax. Please mail to her. She wants 2 paks so she doesn't have to call so often. Thanks.    Action Taken: Message routed to:  Other: cooper ent    Travel Screening: Not Applicable

## 2022-08-30 NOTE — TELEPHONE ENCOUNTER
Two packs of wax traps mailed to address on file     Lara Kamara, CCC-A  Licensed Audiologist  [place] #[id number]      08/30/22

## 2022-09-19 ENCOUNTER — OFFICE VISIT (OUTPATIENT)
Dept: OPHTHALMOLOGY | Facility: CLINIC | Age: 77
End: 2022-09-19
Payer: COMMERCIAL

## 2022-09-19 DIAGNOSIS — H40.1131 PRIMARY OPEN ANGLE GLAUCOMA (POAG) OF BOTH EYES, MILD STAGE: Primary | ICD-10-CM

## 2022-09-19 PROCEDURE — 92012 INTRM OPH EXAM EST PATIENT: CPT | Performed by: OPHTHALMOLOGY

## 2022-09-19 ASSESSMENT — VISUAL ACUITY
OD_PH_SC: 20/25
OD_SC+: +2
OD_PH_SC+: -1
OD_SC: 20/40
OS_SC: 20/25
OS_SC+: -2
METHOD: SNELLEN - LINEAR

## 2022-09-19 ASSESSMENT — TONOMETRY
OD_IOP_MMHG: 19
OS_IOP_MMHG: 18
IOP_METHOD: APPLANATION

## 2022-09-19 ASSESSMENT — EXTERNAL EXAM - LEFT EYE: OS_EXAM: PROLAPSED FAT PADS: UPPER, LOWER

## 2022-09-19 ASSESSMENT — SLIT LAMP EXAM - LIDS
COMMENTS: 2+ DERMATOCHALASIS - UPPER LID
COMMENTS: 2+ DERMATOCHALASIS - UPPER LID

## 2022-09-19 ASSESSMENT — EXTERNAL EXAM - RIGHT EYE: OD_EXAM: PROLAPSED FAT PADS: UPPER, LOWER

## 2022-09-19 NOTE — PATIENT INSTRUCTIONS
Continue Latanoprost every evening both eyes.  Return visit in 6 months for a complete exam.   Maxx Roque M.D.  [phone]

## 2022-09-19 NOTE — LETTER
9/19/2022         RE: Kathya Breaux  [address]  [place] [address]        Dear Colleague,    Thank you for referring your patient, Kathya Breaux, to the [facility]. Please see a copy of my visit note below.     Current Eye Medications: Latanoprost both eyes every evening     Subjective:  Here for IOP check today. Sitting up to take drops instead of laying back and has decreased the phlegm production.     Objective:  See Ophthalmology Exam.       Assessment:  Intraocular pressure continues modestly improved both eyes with Latanoprost; tolerating well.      Plan:  Continue Latanoprost every evening both eyes.  Return visit in 6 months for a complete exam.   Maxx Roque M.D.  [phone]          Again, thank you for allowing me to participate in the care of your patient.        Sincerely,        Maxx Roque MD

## 2022-09-19 NOTE — PROGRESS NOTES
Current Eye Medications: Latanoprost both eyes every evening     Subjective:  Here for IOP check today. Sitting up to take drops instead of laying back and has decreased the phlegm production.     Objective:  See Ophthalmology Exam.       Assessment:  Intraocular pressure continues modestly improved both eyes with Latanoprost; tolerating well.      Plan:  Continue Latanoprost every evening both eyes.  Return visit in 6 months for a complete exam.   Maxx Roque M.D.  [phone]

## 2022-09-28 DIAGNOSIS — D68.51 FACTOR V LEIDEN MUTATION (H): ICD-10-CM

## 2022-09-28 DIAGNOSIS — Z86.718 PERSONAL HISTORY OF DVT (DEEP VEIN THROMBOSIS): ICD-10-CM

## 2022-09-28 RX ORDER — RIVAROXABAN 20 MG/1
TABLET, FILM COATED ORAL
Qty: 100 TABLET | Refills: 2 | Status: SHIPPED | OUTPATIENT
Start: 2022-09-28 | End: 2023-08-10

## 2022-09-30 ENCOUNTER — TRANSFERRED RECORDS (OUTPATIENT)
Dept: HEALTH INFORMATION MANAGEMENT | Facility: CLINIC | Age: 77
End: 2022-09-30

## 2022-09-30 LAB
ALT SERPL-CCNC: 12 IU/L (ref 0–32)
AST SERPL-CCNC: 20 IU/L (ref 0–40)

## 2022-10-09 ENCOUNTER — HEALTH MAINTENANCE LETTER (OUTPATIENT)
Age: 77
End: 2022-10-09

## 2022-10-11 ENCOUNTER — APPOINTMENT (OUTPATIENT)
Dept: URBAN - METROPOLITAN AREA CLINIC 252 | Age: 77
Setting detail: DERMATOLOGY
End: 2022-10-11

## 2022-10-11 VITALS — HEIGHT: 63.5 IN | WEIGHT: 130 LBS

## 2022-10-11 DIAGNOSIS — L82.1 OTHER SEBORRHEIC KERATOSIS: ICD-10-CM

## 2022-10-11 DIAGNOSIS — D18.0 HEMANGIOMA: ICD-10-CM

## 2022-10-11 DIAGNOSIS — L81.4 OTHER MELANIN HYPERPIGMENTATION: ICD-10-CM

## 2022-10-11 PROBLEM — D18.01 HEMANGIOMA OF SKIN AND SUBCUTANEOUS TISSUE: Status: ACTIVE | Noted: 2022-10-11

## 2022-10-11 PROCEDURE — 99213 OFFICE O/P EST LOW 20 MIN: CPT

## 2022-10-11 PROCEDURE — OTHER COUNSELING: OTHER

## 2022-10-11 ASSESSMENT — LOCATION SIMPLE DESCRIPTION DERM
LOCATION SIMPLE: RIGHT PRETIBIAL REGION
LOCATION SIMPLE: RIGHT UPPER BACK
LOCATION SIMPLE: TRAPEZIAL NECK
LOCATION SIMPLE: CHEST
LOCATION SIMPLE: LEFT PRETIBIAL REGION

## 2022-10-11 ASSESSMENT — LOCATION DETAILED DESCRIPTION DERM
LOCATION DETAILED: RIGHT MEDIAL SUPERIOR CHEST
LOCATION DETAILED: LEFT MEDIAL SUPERIOR CHEST
LOCATION DETAILED: RIGHT MID-UPPER BACK
LOCATION DETAILED: MID TRAPEZIAL NECK
LOCATION DETAILED: LEFT PROXIMAL PRETIBIAL REGION
LOCATION DETAILED: RIGHT SUPERIOR UPPER BACK
LOCATION DETAILED: RIGHT PROXIMAL PRETIBIAL REGION

## 2022-10-11 ASSESSMENT — LOCATION ZONE DERM
LOCATION ZONE: LEG
LOCATION ZONE: NECK
LOCATION ZONE: TRUNK

## 2022-10-11 NOTE — PROCEDURE: COUNSELING
Detail Level: Zone
Patient Specific Counseling (Will Not Stick From Patient To Patient): Discussed treatment options of electrocautery for lesions on R upper back, pt declined today but will RTC if itch worsens.
Detail Level: Detailed

## 2022-11-15 ENCOUNTER — TRANSFERRED RECORDS (OUTPATIENT)
Dept: HEALTH INFORMATION MANAGEMENT | Facility: CLINIC | Age: 77
End: 2022-11-15

## 2022-12-06 ENCOUNTER — TRANSFERRED RECORDS (OUTPATIENT)
Dept: HEALTH INFORMATION MANAGEMENT | Facility: CLINIC | Age: 77
End: 2022-12-06

## 2023-01-31 NOTE — PROGRESS NOTES
History of Present Illness - Kathya Breaux is a 77 year old female last seen on 6/27/2022, for procedural removal of severe cerumen impaction.  She is here for 6 month follow up and ear check.    She reports that her ears have progressively felt stuffy again, especially over the last month,  Otherwise, no ear pain, no blood or purulence from the ears.  She denies any vertigo or headache, but the hearing is stuffy.    Past Medical History -   Patient Active Problem List   Diagnosis     Personal history of DVT (deep vein thrombosis)     Factor V Leiden Heterozygote     CARDIOVASCULAR SCREENING; LDL GOAL LESS THAN 130     Long-term (current) use of anticoagulants [Z79.01]     Posterior vitreous detachment, bilateral     Hx of LASIK     Crohn's disease of both small and large intestine with other complication (H)     Age-related osteoporosis without current pathological fracture     Combined forms of age-related cataract, mild, of both eyes     Primary open angle glaucoma (POAG) of both eyes       Current Medications -   Current Outpatient Medications:      CALCIUM 500 + D 500-200 MG-IU OR TABS, 1 TABLET DAILY, Disp: , Rfl: 0     IMURAN 50 MG OR TABS, 1 TABLET DAILY, Disp: , Rfl:      latanoprost (XALATAN) 0.005 % ophthalmic solution, Place 1 drop into both eyes every evening, Disp: 7.5 mL, Rfl: 4     MULTI-VITAMIN OR TABS, 1 TABLET DAILY, Disp: , Rfl: 0     OMEGA 3-6-9 FATTY ACIDS OR, 1 TABLET DAILY, Disp: , Rfl:      OMEPRAZOLE 20 MG OR CPDR, 1 CAPSULE DAILY, Disp: , Rfl:      VITAMIN B-12 1000 MCG/ML IJ SOLN, 1000 MCG Monthly, Disp: , Rfl: 0     VITAMIN-B COMPLEX OR TABS, 1 tablet daily, Disp: , Rfl:      XARELTO ANTICOAGULANT 20 MG TABS tablet, TAKE 1 TABLET BY MOUTH  DAILY WITH DINNER, Disp: 100 tablet, Rfl: 2    Allergies -   Allergies   Allergen Reactions     Penicillins Hives     Throat closed       Social History -   Social History     Social History     Marital status:      Spouse name: N/A      Number of children: N/A     Years of education: N/A     Social History Main Topics     Smoking status: Former Smoker     Packs/day: 0.50     Years: 38.00     Quit date: 3/1/2008     Smokeless tobacco: Never Used      Comment: 6 cigs per day     Alcohol use No     Drug use: No     Sexual activity: Yes     Partners: Male     Other Topics Concern     Not on file     Social History Narrative    Caffeine intake/servings daily - 2-3    Calcium intake/servings daily - 1-2+ plus 1000mg calcium supp    Exercise 0 times weekly - describe    Sunscreen used - Yes    Seatbelts used - Yes    Guns stored in the home - No    Self Breast Exam - Yes    Pap test up to date -  Yes     Eye exam up to date -  Yes    Dental exam up to date -  Yes    DEXA scan up to date -  2005    Flex Sig/Colonoscopy up to date -  Yes 2006    Mammography up to date -  Yes 4/2006    Immunizations reviewed and up to date - Yes 1998    Abuse: Current or Past (Physical, Sexual or Emotional) - No    Do you feel safe in your environment - Yes    Do you cope well with stress - Yes    Do you suffer from insomnia - No    Last updated by: Char Strauss 3/8/2007       Family History -   Family History   Problem Relation Age of Onset     Heart Disease Father         dysrythmia's     Glaucoma Father      Heart Disease Son         clogged arteries     Cardiovascular Son         sudden heart attack     Circulatory Mother         phelbities     Genetic Disorder Mother         factor V leiden     Unknown/Adopted Maternal Grandmother      Gynecology Maternal Grandfather      Cerebrovascular Disease Paternal Grandmother      Cerebrovascular Disease Paternal Grandfather      Alcohol/Drug Brother         alcohol and drugs     Alcohol/Drug Sister         alcohol and drugs     Breast Cancer Cousin      Colon Cancer Other      Anxiety Disorder Sister      Substance Abuse Sister      Anxiety Disorder Brother      Substance Abuse Brother        Review of Systems - As per HPI and  PMHx, otherwise 10+ system review of the head and neck, and general constitution is negative.    Physical Exam  BP (!) 144/72   Pulse 74   Resp 16   Wt 59.4 kg (131 lb)   SpO2 96%   BMI 23.02 kg/m      General - The patient is well nourished and well developed, and appears to have good nutritional status.  Alert and oriented to person and place, answers questions and cooperates with examination appropriately.   Head and Face - Normocephalic and atraumatic, with no gross asymmetry noted of the contour of the facial features.  The facial nerve is intact, with strong symmetric movements.  Voice and Breathing - The patient was breathing comfortably without the use of accessory muscles. There was no wheezing, stridor, or stertor.  The patients voice was clear and strong, and had appropriate pitch and quality.  Eyes - Extraocular movements intact, and the pupils were reactive to light.  Sclera were not icteric or injected, conjunctiva were pink and moist.    Cerumen Removal    Physical Exam and Procedure  Ears - On examination of the ears, I found that the BOTH ears were completely impacted with dense cerumen and her hearing aid ear piece tips.  Therefore, I positioned them in the examination chair in a semi-supine position, beginning with the right side.  I used the binocular surgical microscope to perform cerumen removal.  I began by using a cerumen loop to gently lift the edges of the cerumen mass away from the walls of the external canal.  Once I did this, I was able to suction away fragments of wax and debris using suction.  Once the mass was loose enough, the entire plug was pulled from the canal.  The tympanic membrane was intact, no sign of perforation or middle ear effusion.    I turned my attention to the contralateral side once again using the binocular surgical microscope to perform cerumen removal and ear piece tip.  I began by using a cerumen loop to gently lift the edges of the cerumen mass away from  the walls of the external canal.  Once I did this, I was able to suction away fragments of wax and debris using suction.  Once the mass was loose enough, the entire plug was pulled from the canal.  The tympanic membrane was intact, no sign of perforation or middle ear effusion.      A/P - Kathya Breaux is a 77 year old female  (H61.23) Bilateral impacted cerumen  (primary encounter diagnosis)  (H93.8X3) Sensation of fullness in both ears    The patient has had their cerumen procedurally removed today.  I have discussed ear care at home, including avoiding qtips or any other object placed in the canal.  I have also discussed that over the counter cerumen kits like Debrox or Cerumenex can be useful.    If no other issues, follow up with me in 3-6 months for an ear check.

## 2023-02-03 ENCOUNTER — OFFICE VISIT (OUTPATIENT)
Dept: OTOLARYNGOLOGY | Facility: CLINIC | Age: 78
End: 2023-02-03
Payer: COMMERCIAL

## 2023-02-03 VITALS
DIASTOLIC BLOOD PRESSURE: 72 MMHG | HEART RATE: 74 BPM | OXYGEN SATURATION: 96 % | SYSTOLIC BLOOD PRESSURE: 144 MMHG | WEIGHT: 131 LBS | RESPIRATION RATE: 16 BRPM | BODY MASS INDEX: 23.02 KG/M2

## 2023-02-03 DIAGNOSIS — H90.3 SENSORINEURAL HEARING LOSS (SNHL) OF BOTH EARS: ICD-10-CM

## 2023-02-03 DIAGNOSIS — H61.23 BILATERAL IMPACTED CERUMEN: Primary | ICD-10-CM

## 2023-02-03 PROCEDURE — 99207 PR DROP WITH A PROCEDURE: CPT | Mod: 25 | Performed by: OTOLARYNGOLOGY

## 2023-02-03 PROCEDURE — 69210 REMOVE IMPACTED EAR WAX UNI: CPT | Performed by: OTOLARYNGOLOGY

## 2023-02-03 ASSESSMENT — PAIN SCALES - GENERAL: PAINLEVEL: NO PAIN (0)

## 2023-02-03 NOTE — LETTER
2/3/2023         RE: Kathya Breaux  [address]  [facility] [address]        Dear Colleague,    Thank you for referring your patient, Kathya Breaux, to the [facility]. Please see a copy of my visit note below.    History of Present Illness - Kathya Breaux is a 77 year old female last seen on 6/27/2022, for procedural removal of severe cerumen impaction.  She is here for 6 month follow up and ear check.    She reports that her ears have progressively felt stuffy again, especially over the last month,  Otherwise, no ear pain, no blood or purulence from the ears.  She denies any vertigo or headache, but the hearing is stuffy.    Past Medical History -   Patient Active Problem List   Diagnosis     Personal history of DVT (deep vein thrombosis)     Factor V Leiden Heterozygote     CARDIOVASCULAR SCREENING; LDL GOAL LESS THAN 130     Long-term (current) use of anticoagulants [Z79.01]     Posterior vitreous detachment, bilateral     Hx of LASIK     Crohn's disease of both small and large intestine with other complication (H)     Age-related osteoporosis without current pathological fracture     Combined forms of age-related cataract, mild, of both eyes     Primary open angle glaucoma (POAG) of both eyes       Current Medications -   Current Outpatient Medications:      CALCIUM 500 + D 500-200 MG-IU OR TABS, 1 TABLET DAILY, Disp: , Rfl: 0     IMURAN 50 MG OR TABS, 1 TABLET DAILY, Disp: , Rfl:      latanoprost (XALATAN) 0.005 % ophthalmic solution, Place 1 drop into both eyes every evening, Disp: 7.5 mL, Rfl: 4     MULTI-VITAMIN OR TABS, 1 TABLET DAILY, Disp: , Rfl: 0     OMEGA 3-6-9 FATTY ACIDS OR, 1 TABLET DAILY, Disp: , Rfl:      OMEPRAZOLE 20 MG OR CPDR, 1 CAPSULE DAILY, Disp: , Rfl:      VITAMIN B-12 1000 MCG/ML IJ SOLN, 1000 MCG Monthly, Disp: , Rfl: 0     VITAMIN-B COMPLEX OR TABS, 1 tablet daily, Disp: , Rfl:      XARELTO ANTICOAGULANT 20 MG TABS tablet, TAKE 1  TABLET BY MOUTH  DAILY WITH DINNER, Disp: 100 tablet, Rfl: 2    Allergies -   Allergies   Allergen Reactions     Penicillins Hives     Throat closed       Social History -   Social History     Social History     Marital status:      Spouse name: N/A     Number of children: N/A     Years of education: N/A     Social History Main Topics     Smoking status: Former Smoker     Packs/day: 0.50     Years: 38.00     Quit date: 3/1/2008     Smokeless tobacco: Never Used      Comment: 6 cigs per day     Alcohol use No     Drug use: No     Sexual activity: Yes     Partners: Male     Other Topics Concern     Not on file     Social History Narrative    Caffeine intake/servings daily - 2-3    Calcium intake/servings daily - 1-2+ plus 1000mg calcium supp    Exercise 0 times weekly - describe    Sunscreen used - Yes    Seatbelts used - Yes    Guns stored in the home - No    Self Breast Exam - Yes    Pap test up to date -  Yes     Eye exam up to date -  Yes    Dental exam up to date -  Yes    DEXA scan up to date -  2005    Flex Sig/Colonoscopy up to date -  Yes 2006    Mammography up to date -  Yes 4/2006    Immunizations reviewed and up to date - Yes 1998    Abuse: Current or Past (Physical, Sexual or Emotional) - No    Do you feel safe in your environment - Yes    Do you cope well with stress - Yes    Do you suffer from insomnia - No    Last updated by: Char Strauss 3/8/2007       Family History -   Family History   Problem Relation Age of Onset     Heart Disease Father         dysrythmia's     Glaucoma Father      Heart Disease Son         clogged arteries     Cardiovascular Son         sudden heart attack     Circulatory Mother         phelbities     Genetic Disorder Mother         factor V leiden     Unknown/Adopted Maternal Grandmother      Gynecology Maternal Grandfather      Cerebrovascular Disease Paternal Grandmother      Cerebrovascular Disease Paternal Grandfather      Alcohol/Drug Brother         alcohol and  drugs     Alcohol/Drug Sister         alcohol and drugs     Breast Cancer Cousin      Colon Cancer Other      Anxiety Disorder Sister      Substance Abuse Sister      Anxiety Disorder Brother      Substance Abuse Brother        Review of Systems - As per HPI and PMHx, otherwise 10+ system review of the head and neck, and general constitution is negative.    Physical Exam  BP (!) 144/72   Pulse 74   Resp 16   Wt 59.4 kg (131 lb)   SpO2 96%   BMI 23.02 kg/m      General - The patient is well nourished and well developed, and appears to have good nutritional status.  Alert and oriented to person and place, answers questions and cooperates with examination appropriately.   Head and Face - Normocephalic and atraumatic, with no gross asymmetry noted of the contour of the facial features.  The facial nerve is intact, with strong symmetric movements.  Voice and Breathing - The patient was breathing comfortably without the use of accessory muscles. There was no wheezing, stridor, or stertor.  The patients voice was clear and strong, and had appropriate pitch and quality.  Eyes - Extraocular movements intact, and the pupils were reactive to light.  Sclera were not icteric or injected, conjunctiva were pink and moist.    Cerumen Removal    Physical Exam and Procedure  Ears - On examination of the ears, I found that the BOTH ears were completely impacted with dense cerumen and her hearing aid ear piece tips.  Therefore, I positioned them in the examination chair in a semi-supine position, beginning with the right side.  I used the binocular surgical microscope to perform cerumen removal.  I began by using a cerumen loop to gently lift the edges of the cerumen mass away from the walls of the external canal.  Once I did this, I was able to suction away fragments of wax and debris using suction.  Once the mass was loose enough, the entire plug was pulled from the canal.  The tympanic membrane was intact, no sign of  perforation or middle ear effusion.    I turned my attention to the contralateral side once again using the binocular surgical microscope to perform cerumen removal and ear piece tip.  I began by using a cerumen loop to gently lift the edges of the cerumen mass away from the walls of the external canal.  Once I did this, I was able to suction away fragments of wax and debris using suction.  Once the mass was loose enough, the entire plug was pulled from the canal.  The tympanic membrane was intact, no sign of perforation or middle ear effusion.      A/P - Kathya Breaux is a 77 year old female  (H61.23) Bilateral impacted cerumen  (primary encounter diagnosis)  (H93.8X3) Sensation of fullness in both ears    The patient has had their cerumen procedurally removed today.  I have discussed ear care at home, including avoiding qtips or any other object placed in the canal.  I have also discussed that over the counter cerumen kits like Debrox or Cerumenex can be useful.    If no other issues, follow up with me in 3-6 months for an ear check.          Again, thank you for allowing me to participate in the care of your patient.        Sincerely,        Oscar Moore MD

## 2023-02-20 DIAGNOSIS — H40.1131 PRIMARY OPEN ANGLE GLAUCOMA (POAG) OF BOTH EYES, MILD STAGE: ICD-10-CM

## 2023-02-20 RX ORDER — LATANOPROST 50 UG/ML
1 SOLUTION/ DROPS OPHTHALMIC EVERY EVENING
Qty: 7.5 ML | Refills: 4 | Status: SHIPPED | OUTPATIENT
Start: 2023-02-20 | End: 2024-01-02

## 2023-02-27 NOTE — LETTER
"    5/16/2022         RE: Kathya Breaux  [address] [facility] [address]        Dear Colleague,    Thank you for referring your patient, Kathya Breaux, to the [facility]. Please see a copy of my visit note below.     Current Eye Medications:  Latanoprost both eyes every evening.  Last drops:  9pm.     Subjective:  Follow up Open Angle Glaucoma.  Patient is here for a pressure check, following the addition of Latanoprost.  She complains of a frankie feeling in each eye, for a short time, after using Latanoprost, but it resolves spontaneously.    Also notes some phlegm accumulation after drops.       Objective:  See Ophthalmology Exam.       Assessment:  Good initial decrease in intraocular pressure both eyes in patient with open angle glaucoma.      Plan:  Continue same medication.  Use artificial tears up to four times a day (like Refresh Optive, Systane Balance, TheraTears, or generic artificial tears are ok. Avoid \"get the red out\" drops).   Try nasolacrimal sac occlusion for one minute after instilling drops.  Wait to use artifical tears 5-10 minutes after Latanoprost drop.  Return visit in 4 months for an intraocular pressure check.  Maxx Roque M.D.  [phone]          Again, thank you for allowing me to participate in the care of your patient.        Sincerely,        Maxx Roque MD    "
no concerns

## 2023-03-13 ENCOUNTER — TRANSFERRED RECORDS (OUTPATIENT)
Dept: HEALTH INFORMATION MANAGEMENT | Facility: CLINIC | Age: 78
End: 2023-03-13
Payer: COMMERCIAL

## 2023-03-13 LAB
ALT SERPL-CCNC: 12 IU/L (ref 0–32)
AST SERPL-CCNC: 20 IU/L (ref 0–40)

## 2023-03-20 ENCOUNTER — OFFICE VISIT (OUTPATIENT)
Dept: OPHTHALMOLOGY | Facility: CLINIC | Age: 78
End: 2023-03-20
Payer: COMMERCIAL

## 2023-03-20 DIAGNOSIS — H40.1131 PRIMARY OPEN ANGLE GLAUCOMA (POAG) OF BOTH EYES, MILD STAGE: Primary | ICD-10-CM

## 2023-03-20 DIAGNOSIS — Z01.01 ENCOUNTER FOR EXAMINATION OF EYES AND VISION WITH ABNORMAL FINDINGS: ICD-10-CM

## 2023-03-20 DIAGNOSIS — H25.813 COMBINED FORMS OF AGE-RELATED CATARACT OF BOTH EYES: ICD-10-CM

## 2023-03-20 DIAGNOSIS — H02.831 DERMATOCHALASIS OF BOTH UPPER EYELIDS: ICD-10-CM

## 2023-03-20 DIAGNOSIS — Z98.890 HX OF LASIK: ICD-10-CM

## 2023-03-20 DIAGNOSIS — H02.834 DERMATOCHALASIS OF BOTH UPPER EYELIDS: ICD-10-CM

## 2023-03-20 DIAGNOSIS — H43.813 POSTERIOR VITREOUS DETACHMENT, BILATERAL: ICD-10-CM

## 2023-03-20 DIAGNOSIS — R68.89 SUSPECTED GLAUCOMA OF BOTH EYES: ICD-10-CM

## 2023-03-20 DIAGNOSIS — H52.4 PRESBYOPIA: ICD-10-CM

## 2023-03-20 PROCEDURE — 92014 COMPRE OPH EXAM EST PT 1/>: CPT | Performed by: OPHTHALMOLOGY

## 2023-03-20 PROCEDURE — 92015 DETERMINE REFRACTIVE STATE: CPT | Performed by: OPHTHALMOLOGY

## 2023-03-20 RX ORDER — AZATHIOPRINE 50 MG/1
1 TABLET ORAL DAILY
COMMUNITY
Start: 2023-01-06

## 2023-03-20 ASSESSMENT — REFRACTION_MANIFEST
OS_SPHERE: +0.25
OD_ADD: +2.75
OD_CYLINDER: +1.25
OS_ADD: +2.75
OD_AXIS: 013
OD_SPHERE: -0.75
OS_AXIS: 150
OS_CYLINDER: +1.25

## 2023-03-20 ASSESSMENT — TONOMETRY
IOP_METHOD: APPLANATION
OS_IOP_MMHG: 19
OD_IOP_MMHG: 19

## 2023-03-20 ASSESSMENT — VISUAL ACUITY
OS_SC: 20/30
METHOD: SNELLEN - LINEAR
OD_SC: 20/40
OD_SC+: +2

## 2023-03-20 ASSESSMENT — CONF VISUAL FIELD
OD_SUPERIOR_NASAL_RESTRICTION: 0
OS_INFERIOR_NASAL_RESTRICTION: 0
OS_INFERIOR_TEMPORAL_RESTRICTION: 0
OD_INFERIOR_TEMPORAL_RESTRICTION: 0
OD_NORMAL: 1
OS_SUPERIOR_NASAL_RESTRICTION: 0
OD_INFERIOR_NASAL_RESTRICTION: 0
OS_SUPERIOR_TEMPORAL_RESTRICTION: 0
OD_SUPERIOR_TEMPORAL_RESTRICTION: 0
METHOD: COUNTING FINGERS
OS_NORMAL: 1

## 2023-03-20 ASSESSMENT — CUP TO DISC RATIO
OS_RATIO: 0.6
OD_RATIO: 0.6

## 2023-03-20 ASSESSMENT — SLIT LAMP EXAM - LIDS: COMMENTS: 2-3+ DERMATOCHALASIS - UPPER LID

## 2023-03-20 NOTE — PATIENT INSTRUCTIONS
"Continue Latanoprost (green top) every evening both eyes.    Glasses prescription given - optional  Okay to continue OTC readers.  May use artificial tears up to four times a day (like Refresh Optive, Systane Balance, TheraTears, or generic artificial tears are ok. Avoid \"get the red out\" drops).   Referral to Dr. Musa Bee at [place].  Return visit next available at your convenience for an intraocular pressure check, glaucoma OCT, retinal OCT, and Horta Visual Field.   Maxx Roque M.D.  [phone]    "

## 2023-03-20 NOTE — PROGRESS NOTES
" Current Eye Medications:  Latanoprost at bedtime both eyes, last took at 7 pm. Ariel both eyes every morning      Subjective:  Complete eye exam. Vision is doing well both eyes in the distance. Uses OTC +2.50 readers for near and that is working well. Eyes water all the time. Sometimes has F.B. sensation.     Interested in lid repair.     Objective:  See Ophthalmology Exam.       Assessment:  Intraocular pressures may be too high both eyes in patient with open angle glaucoma.  Visually significant dermatochalasis and ptosis both eyes.      ICD-10-CM    1. Primary open angle glaucoma (POAG) of both eyes, mild stage  H40.1131       2. Combined forms of age-related cataract, mild, of both eyes  H25.813       3. Suspected glaucoma of both eyes  R68.89       4. Dermatochalasis of both upper eyelids  H02.831 Adult Eye  Referral    H02.834       5. Hx of LASIK  Z98.890       6. Posterior vitreous detachment, bilateral  H43.813       7. Encounter for examination of eyes and vision with abnormal findings  Z01.01 EYE EXAM (SIMPLE-NONBILLABLE)      8. Presbyopia  H52.4 REFRACTION           Plan:  Continue Latanoprost (green top) every evening both eyes.    Glasses prescription given - optional  Okay to continue OTC readers.  May use artificial tears up to four times a day (like Refresh Optive, Systane Balance, TheraTears, or generic artificial tears are ok. Avoid \"get the red out\" drops).   Referral to Dr. Musa Bee at [place].  Return visit next available at your convenience for an intraocular pressure check, glaucoma OCT, retinal OCT, and Horta Visual Field.   Switch meds? Selective laser trabeculoplasty?    Maxx Roque M.D.  [phone]      "

## 2023-03-20 NOTE — LETTER
"    3/20/2023         RE: Kathya Breaux  [address] [facility] [address]        Dear Colleague,    Thank you for referring your patient, Kathya Breaux, to the [facility]. Please see a copy of my visit note below.     Current Eye Medications:  Latanoprost at bedtime both eyes, last took at 7 pm. Ariel both eyes every morning      Subjective:  Complete eye exam. Vision is doing well both eyes in the distance. Uses OTC +2.50 readers for near and that is working well. Eyes water all the time. Sometimes has F.B. sensation.     Interested in lid repair.     Objective:  See Ophthalmology Exam.       Assessment:  Intraocular pressures may be too high both eyes in patient with open angle glaucoma.  Visually significant dermatochalasis and ptosis both eyes.      ICD-10-CM    1. Primary open angle glaucoma (POAG) of both eyes, mild stage  H40.1131       2. Combined forms of age-related cataract, mild, of both eyes  H25.813       3. Suspected glaucoma of both eyes  R68.89       4. Dermatochalasis of both upper eyelids  H02.831 Adult Eye  Referral    H02.834       5. Hx of LASIK  Z98.890       6. Posterior vitreous detachment, bilateral  H43.813       7. Encounter for examination of eyes and vision with abnormal findings  Z01.01 EYE EXAM (SIMPLE-NONBILLABLE)      8. Presbyopia  H52.4 REFRACTION           Plan:  Continue Latanoprost (green top) every evening both eyes.    Glasses prescription given - optional  Okay to continue OTC readers.  May use artificial tears up to four times a day (like Refresh Optive, Systane Balance, TheraTears, or generic artificial tears are ok. Avoid \"get the red out\" drops).   Referral to Dr. Musa Bee at [place].  Return visit next available at your convenience for an intraocular pressure check, glaucoma OCT, retinal OCT, and Horta Visual Field.   Switch meds? Selective laser trabeculoplasty?    Maxx Roque M.D.  [phone] "          Again, thank you for allowing me to participate in the care of your patient.        Sincerely,        Maxx Roque MD

## 2023-03-24 ENCOUNTER — TELEPHONE (OUTPATIENT)
Dept: AUDIOLOGY | Facility: CLINIC | Age: 78
End: 2023-03-24
Payer: COMMERCIAL

## 2023-03-24 NOTE — TELEPHONE ENCOUNTER
[facility] Call Center    Phone Message    May a detailed message be left on voicemail: yes     Reason for Call: Other: Pt called to order 2 packages of Mini fit base 6 MM double vent domes, 3 packs of mini fit pro-wax pieces. Please mail these to pt's home address. Thank you.      Action Taken: Other: AUDIO    Travel Screening: Not Applicable

## 2023-03-25 ENCOUNTER — HEALTH MAINTENANCE LETTER (OUTPATIENT)
Age: 78
End: 2023-03-25

## 2023-03-26 ASSESSMENT — SLIT LAMP EXAM - LIDS: COMMENTS: 2-3+ DERMATOCHALASIS - UPPER LID

## 2023-04-07 ENCOUNTER — OFFICE VISIT (OUTPATIENT)
Dept: OPHTHALMOLOGY | Facility: CLINIC | Age: 78
End: 2023-04-07
Payer: COMMERCIAL

## 2023-04-07 DIAGNOSIS — H40.1131 PRIMARY OPEN ANGLE GLAUCOMA (POAG) OF BOTH EYES, MILD STAGE: Primary | ICD-10-CM

## 2023-04-07 PROCEDURE — 92133 CPTRZD OPH DX IMG PST SGM ON: CPT | Performed by: OPHTHALMOLOGY

## 2023-04-07 PROCEDURE — 92083 EXTENDED VISUAL FIELD XM: CPT | Performed by: OPHTHALMOLOGY

## 2023-04-07 PROCEDURE — 92012 INTRM OPH EXAM EST PATIENT: CPT | Performed by: OPHTHALMOLOGY

## 2023-04-07 RX ORDER — TIMOLOL MALEATE 5 MG/ML
1 SOLUTION/ DROPS OPHTHALMIC EVERY MORNING
Qty: 15 ML | Refills: 4 | Status: SHIPPED | OUTPATIENT
Start: 2023-04-07 | End: 2024-05-30

## 2023-04-07 ASSESSMENT — TONOMETRY
OD_IOP_MMHG: 16
IOP_METHOD: APPLANATION
OS_IOP_MMHG: 14

## 2023-04-07 ASSESSMENT — VISUAL ACUITY
OS_SC: 20/40
OS_SC+: +2
METHOD: SNELLEN - LINEAR
OD_SC: 20/30

## 2023-04-07 ASSESSMENT — SLIT LAMP EXAM - LIDS
COMMENTS: 2-3+ DERMATOCHALASIS - UPPER LID
COMMENTS: 2-3+ DERMATOCHALASIS - UPPER LID

## 2023-04-07 NOTE — LETTER
4/7/2023         RE: Kathya Breaux  [address]  [facility] [address]        Dear Colleague,    Thank you for referring your patient, Kathya Breaux, to the [facility]. Please see a copy of my visit note below.     Current Eye Medications:  Latanoprost both eyes every evening.  Last drops:  7pm (very consistent).      Subjective:  Follow up Open Angle Glaucoma. Patient is here for a pressure check, OCT, and visual field.   No vision changes or concerns.  She has noticed some epiphora and increased phlegm since she started using Latanoprost.      Objective:  See Ophthalmology Exam.       Assessment:  Intraocular pressure too high both eyes.  Glaucoma OCT left eye and Horta Visual Field right eye slightly worse.  Would like intraocular pressure 15 or under.     Plan:  Start Timolol drop every morning both eyes.  Continue Latanoprost (green top) every evening both eyes.  Return visit in 1 month for an intraocular pressure check.  Maxx Roque M.D.  [phone]           Again, thank you for allowing me to participate in the care of your patient.        Sincerely,        Maxx Roque MD

## 2023-04-07 NOTE — PROGRESS NOTES
Current Eye Medications:  Latanoprost both eyes every evening.  Last drops:  7pm (very consistent).      Subjective:  Follow up Open Angle Glaucoma. Patient is here for a pressure check, OCT, and visual field.   No vision changes or concerns.  She has noticed some epiphora and increased phlegm since she started using Latanoprost.      Objective:  See Ophthalmology Exam.       Assessment:  Intraocular pressure too high both eyes.  Glaucoma OCT left eye and Horta Visual Field right eye slightly worse.  Would like intraocular pressure 15 or under.     Plan:  Start Timolol drop every morning both eyes.  Continue Latanoprost (green top) every evening both eyes.  Return visit in 1 month for an intraocular pressure check.  Maxx Roque M.D.  [phone]

## 2023-04-07 NOTE — PATIENT INSTRUCTIONS
Start Timolol drop every morning both eyes.  Continue Latanoprost (green top) every evening both eyes.  Return visit in 1 month for an intraocular pressure check.  Maxx Roque M.D.  [phone]

## 2023-04-08 ASSESSMENT — PACHYMETRY
OS_CT(UM): 472
OD_CT(UM): 468

## 2023-04-20 ENCOUNTER — PATIENT OUTREACH (OUTPATIENT)
Dept: CARE COORDINATION | Facility: CLINIC | Age: 78
End: 2023-04-20
Payer: COMMERCIAL

## 2023-05-04 ENCOUNTER — PATIENT OUTREACH (OUTPATIENT)
Dept: CARE COORDINATION | Facility: CLINIC | Age: 78
End: 2023-05-04
Payer: COMMERCIAL

## 2023-05-10 NOTE — PROGRESS NOTES
History of Present Illness - Kathya Breaux is a 77 year old female last seen on 2/3/2023, for procedural removal of severe cerumen impaction.  She is here for 6 month follow up and ear check.    She reports that her ears have progressively felt stuffy again, especially over the last month,  Otherwise, no ear pain, no blood or purulence from the ears.  She denies any vertigo or headache, but the hearing is stuffy.    Past Medical History -   Patient Active Problem List   Diagnosis     Personal history of DVT (deep vein thrombosis)     Factor V Leiden Heterozygote     CARDIOVASCULAR SCREENING; LDL GOAL LESS THAN 130     Long-term (current) use of anticoagulants [Z79.01]     Posterior vitreous detachment, bilateral     Hx of LASIK     Crohn's disease of both small and large intestine with other complication (H)     Age-related osteoporosis without current pathological fracture     Combined forms of age-related cataract, mild, of both eyes     Primary open angle glaucoma (POAG) of both eyes       Current Medications -   Current Outpatient Medications:      azaTHIOprine (IMURAN) 50 MG tablet, Take 1 tablet by mouth daily, Disp: , Rfl:      CALCIUM 500 + D 500-200 MG-IU OR TABS, 1 TABLET DAILY, Disp: , Rfl: 0     IMURAN 50 MG OR TABS, 1 TABLET DAILY, Disp: , Rfl:      latanoprost (XALATAN) 0.005 % ophthalmic solution, Place 1 drop into both eyes every evening, Disp: 7.5 mL, Rfl: 4     MULTI-VITAMIN OR TABS, 1 TABLET DAILY, Disp: , Rfl: 0     OMEGA 3-6-9 FATTY ACIDS OR, 1 TABLET DAILY, Disp: , Rfl:      OMEPRAZOLE 20 MG OR CPDR, 1 CAPSULE DAILY, Disp: , Rfl:      timolol maleate (TIMOPTIC) 0.5 % ophthalmic solution, Place 1 drop into both eyes every morning Wait at least 5 minutes between drops if using more than one at a time., Disp: 15 mL, Rfl: 4     VITAMIN B-12 1000 MCG/ML IJ SOLN, 1000 MCG Monthly, Disp: , Rfl: 0     VITAMIN-B COMPLEX OR TABS, 1 tablet daily, Disp: , Rfl:      XARELTO ANTICOAGULANT 20 MG TABS  tablet, TAKE 1 TABLET BY MOUTH  DAILY WITH DINNER, Disp: 100 tablet, Rfl: 2    Allergies -   Allergies   Allergen Reactions     Penicillins Hives     Throat closed       Social History -   Social History     Social History     Marital status:      Spouse name: N/A     Number of children: N/A     Years of education: N/A     Social History Main Topics     Smoking status: Former Smoker     Packs/day: 0.50     Years: 38.00     Quit date: 3/1/2008     Smokeless tobacco: Never Used      Comment: 6 cigs per day     Alcohol use No     Drug use: No     Sexual activity: Yes     Partners: Male     Other Topics Concern     Not on file     Social History Narrative    Caffeine intake/servings daily - 2-3    Calcium intake/servings daily - 1-2+ plus 1000mg calcium supp    Exercise 0 times weekly - describe    Sunscreen used - Yes    Seatbelts used - Yes    Guns stored in the home - No    Self Breast Exam - Yes    Pap test up to date -  Yes     Eye exam up to date -  Yes    Dental exam up to date -  Yes    DEXA scan up to date -  2005    Flex Sig/Colonoscopy up to date -  Yes 2006    Mammography up to date -  Yes 4/2006    Immunizations reviewed and up to date - Yes 1998    Abuse: Current or Past (Physical, Sexual or Emotional) - No    Do you feel safe in your environment - Yes    Do you cope well with stress - Yes    Do you suffer from insomnia - No    Last updated by: Char Strauss 3/8/2007       Family History -   Family History   Problem Relation Age of Onset     Circulatory Mother         phelbities     Genetic Disorder Mother         factor V leiden     Heart Disease Father         dysrythmia's     Glaucoma Father      Unknown/Adopted Maternal Grandmother      Gynecology Maternal Grandfather      Cerebrovascular Disease Paternal Grandmother      Cerebrovascular Disease Paternal Grandfather      Alcohol/Drug Brother         alcohol and drugs     Anxiety Disorder Brother      Substance Abuse Brother      Alcohol/Drug  Sister         alcohol and drugs     Anxiety Disorder Sister      Substance Abuse Sister      Heart Disease Son         clogged arteries     Cardiovascular Son         sudden heart attack     Breast Cancer Cousin      Colon Cancer Other      Macular Degeneration No family hx of        Review of Systems - As per HPI and PMHx, otherwise 10+ system review of the head and neck, and general constitution is negative.    Physical Exam  There were no vitals taken for this visit.    General - The patient is well nourished and well developed, and appears to have good nutritional status.  Alert and oriented to person and place, answers questions and cooperates with examination appropriately.   Head and Face - Normocephalic and atraumatic, with no gross asymmetry noted of the contour of the facial features.  The facial nerve is intact, with strong symmetric movements.  Voice and Breathing - The patient was breathing comfortably without the use of accessory muscles. There was no wheezing, stridor, or stertor.  The patients voice was clear and strong, and had appropriate pitch and quality.  Eyes - Extraocular movements intact, and the pupils were reactive to light.  Sclera were not icteric or injected, conjunctiva were pink and moist.    Cerumen Removal    Physical Exam and Procedure  Ears - On examination of the ears, I found that the BOTH ears were completely impacted with dense cerumen and her hearing aid ear piece tips.  Therefore, I positioned them in the examination chair in a semi-supine position, beginning with the right side.  I used the binocular surgical microscope to perform cerumen removal.  I began by using a cerumen loop to gently lift the edges of the cerumen mass away from the walls of the external canal.  Once I did this, I was able to suction away fragments of wax and debris using suction.  Once the mass was loose enough, the entire plug was pulled from the canal.  The tympanic membrane was intact, no sign of  perforation or middle ear effusion.    I turned my attention to the contralateral side once again using the binocular surgical microscope to perform cerumen removal and ear piece tip.  I began by using a cerumen loop to gently lift the edges of the cerumen mass away from the walls of the external canal.  Once I did this, I was able to suction away fragments of wax and debris using suction.  Once the mass was loose enough, the entire plug was pulled from the canal.  The tympanic membrane was intact, no sign of perforation or middle ear effusion.      A/P - Kathya Breaux is a 77 year old female  (H61.23) Bilateral impacted cerumen  (primary encounter diagnosis)  (H93.8X3) Sensation of fullness in both ears    The patient has had their cerumen procedurally removed today.  I have discussed ear care at home, including avoiding qtips or any other object placed in the canal.  I have also discussed that over the counter cerumen kits like Debrox or Cerumenex can be useful.    If no other issues, follow up with me in 3-6 months for an ear check.

## 2023-05-15 ENCOUNTER — OFFICE VISIT (OUTPATIENT)
Dept: OTOLARYNGOLOGY | Facility: CLINIC | Age: 78
End: 2023-05-15
Attending: OTOLARYNGOLOGY
Payer: COMMERCIAL

## 2023-05-15 VITALS
BODY MASS INDEX: 23.02 KG/M2 | SYSTOLIC BLOOD PRESSURE: 135 MMHG | OXYGEN SATURATION: 98 % | WEIGHT: 131 LBS | RESPIRATION RATE: 16 BRPM | DIASTOLIC BLOOD PRESSURE: 77 MMHG | HEART RATE: 74 BPM

## 2023-05-15 DIAGNOSIS — H61.23 BILATERAL IMPACTED CERUMEN: ICD-10-CM

## 2023-05-15 DIAGNOSIS — H90.3 SENSORINEURAL HEARING LOSS (SNHL) OF BOTH EARS: Primary | ICD-10-CM

## 2023-05-15 PROCEDURE — 99207 PR DROP WITH A PROCEDURE: CPT | Mod: 25 | Performed by: OTOLARYNGOLOGY

## 2023-05-15 PROCEDURE — 69210 REMOVE IMPACTED EAR WAX UNI: CPT | Performed by: OTOLARYNGOLOGY

## 2023-05-15 ASSESSMENT — PAIN SCALES - GENERAL: PAINLEVEL: NO PAIN (0)

## 2023-05-15 NOTE — LETTER
5/15/2023         RE: Kathya Breaux  [address]  [facility] [address]        Dear Colleague,    Thank you for referring your patient, Kathya Breaux, to the [facility]. Please see a copy of my visit note below.    History of Present Illness - Kathya Breaux is a 77 year old female last seen on 2/3/2023, for procedural removal of severe cerumen impaction.  She is here for 6 month follow up and ear check.    She reports that her ears have progressively felt stuffy again, especially over the last month,  Otherwise, no ear pain, no blood or purulence from the ears.  She denies any vertigo or headache, but the hearing is stuffy.    Past Medical History -   Patient Active Problem List   Diagnosis     Personal history of DVT (deep vein thrombosis)     Factor V Leiden Heterozygote     CARDIOVASCULAR SCREENING; LDL GOAL LESS THAN 130     Long-term (current) use of anticoagulants [Z79.01]     Posterior vitreous detachment, bilateral     Hx of LASIK     Crohn's disease of both small and large intestine with other complication (H)     Age-related osteoporosis without current pathological fracture     Combined forms of age-related cataract, mild, of both eyes     Primary open angle glaucoma (POAG) of both eyes       Current Medications -   Current Outpatient Medications:      azaTHIOprine (IMURAN) 50 MG tablet, Take 1 tablet by mouth daily, Disp: , Rfl:      CALCIUM 500 + D 500-200 MG-IU OR TABS, 1 TABLET DAILY, Disp: , Rfl: 0     IMURAN 50 MG OR TABS, 1 TABLET DAILY, Disp: , Rfl:      latanoprost (XALATAN) 0.005 % ophthalmic solution, Place 1 drop into both eyes every evening, Disp: 7.5 mL, Rfl: 4     MULTI-VITAMIN OR TABS, 1 TABLET DAILY, Disp: , Rfl: 0     OMEGA 3-6-9 FATTY ACIDS OR, 1 TABLET DAILY, Disp: , Rfl:      OMEPRAZOLE 20 MG OR CPDR, 1 CAPSULE DAILY, Disp: , Rfl:      timolol maleate (TIMOPTIC) 0.5 % ophthalmic solution, Place 1 drop into both eyes every morning  Wait at least 5 minutes between drops if using more than one at a time., Disp: 15 mL, Rfl: 4     VITAMIN B-12 1000 MCG/ML IJ SOLN, 1000 MCG Monthly, Disp: , Rfl: 0     VITAMIN-B COMPLEX OR TABS, 1 tablet daily, Disp: , Rfl:      XARELTO ANTICOAGULANT 20 MG TABS tablet, TAKE 1 TABLET BY MOUTH  DAILY WITH DINNER, Disp: 100 tablet, Rfl: 2    Allergies -   Allergies   Allergen Reactions     Penicillins Hives     Throat closed       Social History -   Social History     Social History     Marital status:      Spouse name: N/A     Number of children: N/A     Years of education: N/A     Social History Main Topics     Smoking status: Former Smoker     Packs/day: 0.50     Years: 38.00     Quit date: 3/1/2008     Smokeless tobacco: Never Used      Comment: 6 cigs per day     Alcohol use No     Drug use: No     Sexual activity: Yes     Partners: Male     Other Topics Concern     Not on file     Social History Narrative    Caffeine intake/servings daily - 2-3    Calcium intake/servings daily - 1-2+ plus 1000mg calcium supp    Exercise 0 times weekly - describe    Sunscreen used - Yes    Seatbelts used - Yes    Guns stored in the home - No    Self Breast Exam - Yes    Pap test up to date -  Yes     Eye exam up to date -  Yes    Dental exam up to date -  Yes    DEXA scan up to date -  2005    Flex Sig/Colonoscopy up to date -  Yes 2006    Mammography up to date -  Yes 4/2006    Immunizations reviewed and up to date - Yes 1998    Abuse: Current or Past (Physical, Sexual or Emotional) - No    Do you feel safe in your environment - Yes    Do you cope well with stress - Yes    Do you suffer from insomnia - No    Last updated by: Char Strauss 3/8/2007       Family History -   Family History   Problem Relation Age of Onset     Circulatory Mother         phelbities     Genetic Disorder Mother         factor V leiden     Heart Disease Father         dysrythmia's     Glaucoma Father      Unknown/Adopted Maternal Grandmother       Gynecology Maternal Grandfather      Cerebrovascular Disease Paternal Grandmother      Cerebrovascular Disease Paternal Grandfather      Alcohol/Drug Brother         alcohol and drugs     Anxiety Disorder Brother      Substance Abuse Brother      Alcohol/Drug Sister         alcohol and drugs     Anxiety Disorder Sister      Substance Abuse Sister      Heart Disease Son         clogged arteries     Cardiovascular Son         sudden heart attack     Breast Cancer Cousin      Colon Cancer Other      Macular Degeneration No family hx of        Review of Systems - As per HPI and PMHx, otherwise 10+ system review of the head and neck, and general constitution is negative.    Physical Exam  There were no vitals taken for this visit.    General - The patient is well nourished and well developed, and appears to have good nutritional status.  Alert and oriented to person and place, answers questions and cooperates with examination appropriately.   Head and Face - Normocephalic and atraumatic, with no gross asymmetry noted of the contour of the facial features.  The facial nerve is intact, with strong symmetric movements.  Voice and Breathing - The patient was breathing comfortably without the use of accessory muscles. There was no wheezing, stridor, or stertor.  The patients voice was clear and strong, and had appropriate pitch and quality.  Eyes - Extraocular movements intact, and the pupils were reactive to light.  Sclera were not icteric or injected, conjunctiva were pink and moist.    Cerumen Removal    Physical Exam and Procedure  Ears - On examination of the ears, I found that the BOTH ears were completely impacted with dense cerumen and her hearing aid ear piece tips.  Therefore, I positioned them in the examination chair in a semi-supine position, beginning with the right side.  I used the binocular surgical microscope to perform cerumen removal.  I began by using a cerumen loop to gently lift the edges of the cerumen  mass away from the walls of the external canal.  Once I did this, I was able to suction away fragments of wax and debris using suction.  Once the mass was loose enough, the entire plug was pulled from the canal.  The tympanic membrane was intact, no sign of perforation or middle ear effusion.    I turned my attention to the contralateral side once again using the binocular surgical microscope to perform cerumen removal and ear piece tip.  I began by using a cerumen loop to gently lift the edges of the cerumen mass away from the walls of the external canal.  Once I did this, I was able to suction away fragments of wax and debris using suction.  Once the mass was loose enough, the entire plug was pulled from the canal.  The tympanic membrane was intact, no sign of perforation or middle ear effusion.      A/P - Kathya Breaux is a 77 year old female  (H61.23) Bilateral impacted cerumen  (primary encounter diagnosis)  (H93.8X3) Sensation of fullness in both ears    The patient has had their cerumen procedurally removed today.  I have discussed ear care at home, including avoiding qtips or any other object placed in the canal.  I have also discussed that over the counter cerumen kits like Debrox or Cerumenex can be useful.    If no other issues, follow up with me in 3-6 months for an ear check.          Again, thank you for allowing me to participate in the care of your patient.        Sincerely,        Oscar Moore MD

## 2023-05-19 ENCOUNTER — OFFICE VISIT (OUTPATIENT)
Dept: OPHTHALMOLOGY | Facility: CLINIC | Age: 78
End: 2023-05-19
Payer: COMMERCIAL

## 2023-05-19 DIAGNOSIS — H40.1131 PRIMARY OPEN ANGLE GLAUCOMA (POAG) OF BOTH EYES, MILD STAGE: Primary | ICD-10-CM

## 2023-05-19 PROCEDURE — 92012 INTRM OPH EXAM EST PATIENT: CPT | Performed by: OPHTHALMOLOGY

## 2023-05-19 ASSESSMENT — TONOMETRY
OS_IOP_MMHG: 13
OD_IOP_MMHG: 13
IOP_METHOD: APPLANATION

## 2023-05-19 ASSESSMENT — VISUAL ACUITY
OD_SC+: -2
METHOD: SNELLEN - LINEAR
OS_SC: 20/30
OD_SC: 20/30
OS_SC+: -2

## 2023-05-19 NOTE — LETTER
5/19/2023         RE: Kathya Breaux  [address]  [facility] [address]        Dear Colleague,    Thank you for referring your patient, Kathya Breaux, to the [facility]. Please see a copy of my visit note below.     Current Eye Medications:  Latanoprost - both eyes at bedtime  - last dose: 7pm last night  Timolol - both eyes QAM - last dose: 7am today     Subjective:  1 mo f/u with IOP check - started Timolol - denies any issues with itching, redness or pain.  Eyes do water for the last 2 mos.    Sees Ali next week.      Objective:  See Ophthalmology Exam.       Assessment:  Good initial decrease in intraocular pressure both eyes with addition of Timolol.      Plan:  Continue same medications.  Return visit 4 months for an intraocular pressure check.  Maxx Roque M.D.  [phone]           Again, thank you for allowing me to participate in the care of your patient.        Sincerely,        Maxx Roque MD

## 2023-05-19 NOTE — PATIENT INSTRUCTIONS
Continue same medications.  Return visit 4 months for an intraocular pressure check.  Maxx Roque M.D.  [phone]

## 2023-05-24 ENCOUNTER — OFFICE VISIT (OUTPATIENT)
Dept: OPHTHALMOLOGY | Facility: CLINIC | Age: 78
End: 2023-05-24
Attending: OPHTHALMOLOGY
Payer: COMMERCIAL

## 2023-05-24 DIAGNOSIS — H02.831 DERMATOCHALASIS OF BOTH UPPER EYELIDS: ICD-10-CM

## 2023-05-24 DIAGNOSIS — H02.834 DERMATOCHALASIS OF BOTH UPPER EYELIDS: ICD-10-CM

## 2023-05-24 DIAGNOSIS — H02.132 SENILE ECTROPION OF RIGHT LOWER EYELID: ICD-10-CM

## 2023-05-24 DIAGNOSIS — H02.135 SENILE ECTROPION OF LEFT LOWER EYELID: ICD-10-CM

## 2023-05-24 DIAGNOSIS — H02.403 ACQUIRED INVOLUTIONAL PTOSIS OF EYELID, BILATERAL: Primary | ICD-10-CM

## 2023-05-24 DIAGNOSIS — H02.413 MECHANICAL PTOSIS OF EYELID OF BOTH EYES: ICD-10-CM

## 2023-05-24 PROCEDURE — 92081 LIMITED VISUAL FIELD XM: CPT | Mod: GC | Performed by: OPHTHALMOLOGY

## 2023-05-24 PROCEDURE — 92285 EXTERNAL OCULAR PHOTOGRAPHY: CPT | Mod: GC | Performed by: OPHTHALMOLOGY

## 2023-05-24 PROCEDURE — 99214 OFFICE O/P EST MOD 30 MIN: CPT | Mod: GC | Performed by: OPHTHALMOLOGY

## 2023-05-24 ASSESSMENT — LAGOPHTHALMOS
OD_LAGOPHTHALMOS: 0
OS_LAGOPHTHALMOS: 0

## 2023-05-24 ASSESSMENT — TONOMETRY
IOP_METHOD: ICARE
OS_IOP_MMHG: 11
OD_IOP_MMHG: 10

## 2023-05-24 ASSESSMENT — VISUAL ACUITY
OS_SC: 20/50
OS_SC+: +2
OD_SC: 20/30
METHOD: SNELLEN - LINEAR

## 2023-05-24 ASSESSMENT — MARGIN REFLEX DISTANCE
OS_MRD1: 0
OD_MRD1: 1

## 2023-05-24 ASSESSMENT — LEVATOR FUNCTION
OD_LEVATOR: 10
OS_LEVATOR: 8

## 2023-05-24 ASSESSMENT — CONF VISUAL FIELD: COMMENTS: TANGENT VISUAL FIELD PERFORMED TODAY.

## 2023-05-24 NOTE — LETTER
2023         RE:  :  MRN: Ktahya Breaux  1945  [id number]     Dear Dr. Maxx Roque,    Thank you for asking me to see your patient, Kathya Breaux, for an oculoplastic   consultation.  My assessment and plan are below.  For further details, please see my attached clinic note.           HPI:     Chief Complaint(s) and History of Present Illness(es)     Droopy Eye Lid Evaluation            Laterality: right upper lid and left upper lid          Comments    Patient referred by Dr. Roque for dermatochalasis evaluation.   She notes her lids have been gradually drooping over the past few years.   She is noticing it interfering with her vision. Reading has become more   difficult unless she is holding her lids up.   She can also feel tiny hairs and debris getting caught between her lid and   her lashes where the skin folds over.   Multiple family members have also had to have eyelid procedures.     Hx of Lasik  POAG on two eye drops         Kathya Breaux is a 77 year old female who has noted gradual onset of droopy eyelids over the past years. The droopy eyelid is interfering with activities of daily living including driving, and reading. The patient denies double vision, variability of the eyelid position, or dry eye symptoms.     She has significant watering from both eyes. This is worst in the morning.    EXAM:     MRD1: 1 mm right eye and 0 mm left eye.   Dermatochalasis with excess skin touching eyelashes    VISUAL FIELD:  Right eye untaped: 30 degrees Right eye taped: 50 degrees  Left eye untaped: 30 degrees Left eye taped: 50 degrees    Assessment & Plan     Kathya Breaux is a 77 year old female with the following diagnoses:   1. Acquired involutional ptosis of eyelid, bilateral    2. Dermatochalasis of both upper eyelids    3. 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 Senile ectropion of right lower eyelid    5. Senile ectropion of left lower eyelid         Bilateral upper  lid dermatochalasis and ptosis left > right.  Also has both lower eyelid ectropion, causing significant epiphora    Plan:  Both upper eyelid blepharoplasty (SONF) 42172  Bilateral ptosis repair posterior approach 8 mm right eye and 8.5 mm left eye 23505  Both lower eyelid ectropion repair 96807    Considering BLLB (TCF, SP, ectropion as above).       ANTICOAGULATION: on Xarelto for Factor V Leiden  Will cc PCP regarding holding anticoagulation       On Imuran for Crohn's disease. Not on prednisone.       Again, thank you for allowing me to participate in the care of your patient.      Sincerely,    Musa Bee MD  Department of Ophthalmology and Visual Neurosciences  [facility]    CC: Maxx Roque MD  [address] [facility] [address]  Via In Basket

## 2023-05-24 NOTE — NURSING NOTE
Chief Complaints and History of Present Illnesses   Patient presents with     Droopy Eye Lid Evaluation       Chief Complaint(s) and History of Present Illness(es)     Droopy Eye Lid Evaluation            Laterality: right upper lid and left upper lid          Comments    Patient referred by Dr. Roque for dermatochalasis evaluation.   She notes her lids have been gradually drooping over the past few years. She is noticing it interfering with her vision. Reading has become more difficult unless she is holding her lids up.   She can also feel tiny hairs and debris getting caught between her lid and her lashes where the skin folds over.   Multiple family members have also had to have eyelid procedures.     Hx of Lasik  POAG                 Leonel Tran, Ophthalmic Assistant

## 2023-05-24 NOTE — PROGRESS NOTES
Oculoplastic Clinic New Patient    Patient: Kathya Breaux MRN# [id number]   YOB: 1945 Age: 77 year old   Date of Visit: May 24, 2023    CC: Droopy eyelids obstructing vision.              HPI:     Chief Complaint(s) and History of Present Illness(es)     Droopy Eye Lid Evaluation            Laterality: right upper lid and left upper lid          Comments    Patient referred by Dr. Roque for dermatochalasis evaluation.   She notes her lids have been gradually drooping over the past few years.   She is noticing it interfering with her vision. Reading has become more   difficult unless she is holding her lids up.   She can also feel tiny hairs and debris getting caught between her lid and   her lashes where the skin folds over.   Multiple family members have also had to have eyelid procedures.     Hx of Lasik  POAG on two eye drops         Kathya Breaux is a 77 year old female who has noted gradual onset of droopy eyelids over the past years. The droopy eyelid is interfering with activities of daily living including driving, and reading. The patient denies double vision, variability of the eyelid position, or dry eye symptoms.     She has significant watering from both eyes. This is worst in the morning.    EXAM:     MRD1: 1 mm right eye and 0 mm left eye.   Dermatochalasis with excess skin touching eyelashes    VISUAL FIELD:  Right eye untaped: 30 degrees Right eye taped: 50 degrees  Left eye untaped: 30 degrees Left eye taped: 50 degrees    Assessment & Plan     Kathya Breaux is a 77 year old female with the following diagnoses:   1. Acquired involutional ptosis of eyelid, bilateral    2. Dermatochalasis of both upper eyelids    3. 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 Senile ectropion of right lower eyelid    5. Senile ectropion of left lower eyelid         Bilateral upper lid dermatochalasis and ptosis left > right.  Also has both lower eyelid ectropion, causing significant  epiphora    Plan:  Both upper eyelid blepharoplasty (SONF) 34453  Bilateral ptosis repair posterior approach 8 mm right eye and 8.5 mm left eye 48267  Both lower eyelid ectropion repair 34744    Considering BLLB (TCF, SP, ectropion as above).       ANTICOAGULATION: on Xarelto for Factor V Leiden  Will cc PCP regarding holding anticoagulation       On Imuran for Crohn's disease. Not on prednisone.        PHOTOS DEMONSTRATE:    Significant dermatochalasis with lids resting on eyelashes and obstructing visual axis  Blepharoptosis    Attending Physician Attestation: Complete documentation of historical and exam elements from today's encounter can be found in the full encounter summary report (not reduplicated in this progress note). I personally obtained the chief complaint(s) and history of present illness. I confirmed and edited as necessary the review of systems, past medical/surgical history, family history, social history, and examination findings as documented by others; and I examined the patient myself. I personally reviewed the relevant tests, images, and reports as documented above. I formulated and edited as necessary the assessment and plan and discussed the findings and management plan with the patient.  -Musa Bee MD      Today with Kathya Breaux I reviewed the indications, risks, benefits, and alternatives of the proposed surgical procedure including, but not limited to, failure obtain the desired result  and need for additional surgery, bleeding, infection, loss of vision, loss of the eye, and the remote possibility of permanent damage to any organ system or death with the use of anesthesia.  I provided multiple opportunities for the questions, answered all questions to the best of my ability, and confirmed that my answers and my discussion were understood.

## 2023-06-10 ENCOUNTER — HEALTH MAINTENANCE LETTER (OUTPATIENT)
Age: 78
End: 2023-06-10

## 2023-08-06 ASSESSMENT — ENCOUNTER SYMPTOMS
PARESTHESIAS: 0
JOINT SWELLING: 0
NERVOUS/ANXIOUS: 0
HEMATOCHEZIA: 0
HEARTBURN: 0
SORE THROAT: 0
FEVER: 0
HEADACHES: 0
MYALGIAS: 0
ABDOMINAL PAIN: 0
DIARRHEA: 0
CHILLS: 0
FREQUENCY: 0
WEAKNESS: 0
NAUSEA: 0
EYE PAIN: 0
SHORTNESS OF BREATH: 0
BREAST MASS: 0
HEMATURIA: 0
COUGH: 0
CONSTIPATION: 0
DYSURIA: 0
DIZZINESS: 0
PALPITATIONS: 0
ARTHRALGIAS: 1

## 2023-08-06 ASSESSMENT — ACTIVITIES OF DAILY LIVING (ADL): CURRENT_FUNCTION: NO ASSISTANCE NEEDED

## 2023-08-10 ENCOUNTER — OFFICE VISIT (OUTPATIENT)
Dept: INTERNAL MEDICINE | Facility: CLINIC | Age: 78
End: 2023-08-10
Payer: COMMERCIAL

## 2023-08-10 VITALS
RESPIRATION RATE: 16 BRPM | SYSTOLIC BLOOD PRESSURE: 132 MMHG | OXYGEN SATURATION: 99 % | DIASTOLIC BLOOD PRESSURE: 81 MMHG | WEIGHT: 122.8 LBS | BODY MASS INDEX: 21.58 KG/M2 | HEART RATE: 64 BPM

## 2023-08-10 DIAGNOSIS — Z23 NEED FOR SHINGLES VACCINE: ICD-10-CM

## 2023-08-10 DIAGNOSIS — Z12.31 VISIT FOR SCREENING MAMMOGRAM: Primary | ICD-10-CM

## 2023-08-10 DIAGNOSIS — Z00.00 ENCOUNTER FOR MEDICARE ANNUAL WELLNESS EXAM: ICD-10-CM

## 2023-08-10 DIAGNOSIS — D68.51 FACTOR V LEIDEN MUTATION (H): ICD-10-CM

## 2023-08-10 DIAGNOSIS — Z86.718 PERSONAL HISTORY OF DVT (DEEP VEIN THROMBOSIS): ICD-10-CM

## 2023-08-10 DIAGNOSIS — Z23 NEED FOR DIPHTHERIA-TETANUS-PERTUSSIS (TDAP) VACCINE: ICD-10-CM

## 2023-08-10 DIAGNOSIS — Z13.6 CARDIOVASCULAR SCREENING; LDL GOAL LESS THAN 130: ICD-10-CM

## 2023-08-10 DIAGNOSIS — K50.818 CROHN'S DISEASE OF BOTH SMALL AND LARGE INTESTINE WITH OTHER COMPLICATION (H): ICD-10-CM

## 2023-08-10 PROCEDURE — G0439 PPPS, SUBSEQ VISIT: HCPCS | Performed by: INTERNAL MEDICINE

## 2023-08-10 PROCEDURE — 99214 OFFICE O/P EST MOD 30 MIN: CPT | Mod: 25 | Performed by: INTERNAL MEDICINE

## 2023-08-10 ASSESSMENT — ENCOUNTER SYMPTOMS
SORE THROAT: 0
ABDOMINAL PAIN: 0
PALPITATIONS: 0
NAUSEA: 0
CHILLS: 0
HEMATOCHEZIA: 0
COUGH: 0
DYSURIA: 0
FREQUENCY: 0
BREAST MASS: 0
HEADACHES: 0
SHORTNESS OF BREATH: 0
PARESTHESIAS: 0
HEMATURIA: 0
FEVER: 0
EYE PAIN: 0
CONSTIPATION: 0
JOINT SWELLING: 0
WEAKNESS: 0
DIARRHEA: 0
HEARTBURN: 0
ARTHRALGIAS: 1
MYALGIAS: 0
DIZZINESS: 0
NERVOUS/ANXIOUS: 0

## 2023-08-10 ASSESSMENT — ACTIVITIES OF DAILY LIVING (ADL): CURRENT_FUNCTION: NO ASSISTANCE NEEDED

## 2023-08-10 NOTE — Clinical Note
The orders for the fasting lipid panel need to be faxed over to the [facility] clinic where she has her laboratory studies done

## 2023-08-10 NOTE — PROGRESS NOTES
"SUBJECTIVE:   Kathya is a 77 year old who presents for Preventive Visit.       No data to display                Are you in the first 12 months of your Medicare coverage?  No    Healthy Habits:     In general, how would you rate your overall health?  Good    Frequency of exercise:  2-3 days/week    Duration of exercise:  15-30 minutes    Do you usually eat at least 4 servings of fruit and vegetables a day, include whole grains    & fiber and avoid regularly eating high fat or \"junk\" foods?  Yes    Taking medications regularly:  Yes    Medication side effects:  None    Ability to successfully perform activities of daily living:  No assistance needed    Home Safety:  No safety concerns identified    Hearing Impairment:  No hearing concerns    In the past 6 months, have you been bothered by leaking of urine?  No    In general, how would you rate your overall mental or emotional health?  Good    Additional concerns today:  No    Hearing aids on board     Wt Readings from Last 5 Encounters:   08/10/23 55.7 kg (122 lb 12.8 oz)   05/15/23 59.4 kg (131 lb)   02/03/23 59.4 kg (131 lb)   06/27/22 59.4 kg (131 lb)   05/06/22 59.4 kg (131 lb)     Body mass index is 21.58 kg/m .      Have you ever done Advance Care Planning? (For example, a Health Directive, POLST, or a discussion with a medical provider or your loved ones about your wishes): No, advance care planning information given to patient to review.  Advanced care planning was discussed at today's visit.      Fall risk  Fallen 2 or more times in the past year?: No  Any fall with injury in the past year?: No    Cognitive Screening   1) Repeat 3 items (Leader, Season, Table)    2) Clock draw: NORMAL  3) 3 item recall: Recalls 3 objects  Results: 3 items recalled: COGNITIVE IMPAIRMENT LESS LIKELY    Mini-CogTM Copyright S Janae. Licensed by the author for use in [facility] [employer] [facility]; reprinted with permission (jaiden@.[facility]). All rights reserved.      Do you have sleep " apnea, excessive snoring or daytime drowsiness? : no    Reviewed and updated as needed this visit by clinical staff   Tobacco  Allergies  Meds              Reviewed and updated as needed this visit by Provider                 Social History     Tobacco Use     Smoking status: Former     Packs/day: 0.50     Years: 38.00     Pack years: 19.00     Types: Cigarettes     Quit date: 3/1/2008     Years since quitting: 15.4     Smokeless tobacco: Never     Tobacco comments:     6 cigs per day   Substance Use Topics     Alcohol use: No             8/6/2023     9:00 AM   Alcohol Use   Prescreen: >3 drinks/day or >7 drinks/week? Not Applicable     Do you have a current opioid prescription? No  Do you use any other controlled substances or medications that are not prescribed by a provider? None      Current providers sharing in care for this patient include:   Patient Care Team:  Navi Justice MD as PCP - General (Internal Medicine)  Candice Mallory RPH as Pharmacist (Pharmacist Ambulatory Care)  Maxx Roque MD as Assigned Surgical Provider  Oscar Moore MD as MD (Otolaryngology)  uMsa Bee MD as MD (Ophthalmology)  Phyllis Espinosa APRN CNP as Assigned PCP    The following health maintenance items are reviewed in Epic and correct as of today:  Health Maintenance   Topic Date Due     ZOSTER IMMUNIZATION (1 of 2) Never done     ANNUAL REVIEW OF HM ORDERS  11/22/2022     MAMMO SCREENING  01/10/2023     MEDICARE ANNUAL WELLNESS VISIT  05/06/2023     DTAP/TDAP/TD IMMUNIZATION (2 - Td or Tdap) 05/31/2023     INFLUENZA VACCINE (1) 09/01/2023     FALL RISK ASSESSMENT  08/10/2024     COLORECTAL CANCER SCREENING  06/08/2026     LIPID  05/06/2027     ADVANCE CARE PLANNING  05/06/2027     DEXA  01/10/2037     HEPATITIS C SCREENING  Completed     PHQ-2 (once per calendar year)  Completed     Pneumococcal Vaccine: 65+ Years  Completed     COVID-19 Vaccine  Completed     IPV IMMUNIZATION  Aged Out      "MENINGITIS IMMUNIZATION  Aged Out     Health Maintenance Due   Topic Date Due     ZOSTER IMMUNIZATION (1 of 2) Never done     ANNUAL REVIEW OF HM ORDERS  11/22/2022     MAMMO SCREENING  01/10/2023     MEDICARE ANNUAL WELLNESS VISIT  05/06/2023     DTAP/TDAP/TD IMMUNIZATION (2 - Td or Tdap) 05/31/2023        Lab work is in process  Labs reviewed in EPIC  BP Readings from Last 3 Encounters:   08/10/23 132/81   05/15/23 135/77   02/03/23 (!) 144/72    Wt Readings from Last 3 Encounters:   08/10/23 55.7 kg (122 lb 12.8 oz)   05/15/23 59.4 kg (131 lb)   02/03/23 59.4 kg (131 lb)                  Patient Active Problem List   Diagnosis     Personal history of DVT (deep vein thrombosis)     Factor V Leiden Heterozygote     CARDIOVASCULAR SCREENING; LDL GOAL LESS THAN 130     Long-term (current) use of anticoagulants [Z79.01]     Posterior vitreous detachment, bilateral     Hx of LASIK     Crohn's disease of both small and large intestine with other complication (H)     Age-related osteoporosis without current pathological fracture     Combined forms of age-related cataract, mild, of both eyes     Primary open angle glaucoma (POAG) of both eyes     Dermatochalasis of both upper eyelids     Acquired involutional ptosis of eyelid, bilateral     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     Past Surgical History:   Procedure Laterality Date     ABDOMEN SURGERY  3 events    Bowel resec. in 1964, bowel resec in 1969     APPENDECTOMY      first bowel surgery     COLONOSCOPY  05/2016    Q 5 years      ENHANCE LASER REFRACTIVE BILATERAL EXISTING PT IN PAR[facility]ERS       EYE SURGERY  2001    lasik surgery     GYN SURGERY  1980    hysterectomy     ZZC NONSPECIFIC PROCEDURE  1964    3 1/2\" sm. intestine removed-illeitis     ZZC NONSPECIFIC PROCEDURE  1969    4\" sm. intestine removed-illeitis     ZZC NONSPECIFIC PROCEDURE  1980    Hysterectomy       Social History     Tobacco Use     Smoking status: Former     Packs/day: 0.50     Years: " 38.00     Pack years: 19.00     Types: Cigarettes     Quit date: 3/1/2008     Years since quitting: 15.4     Smokeless tobacco: Never     Tobacco comments:     6 cigs per day   Substance Use Topics     Alcohol use: No     Family History   Problem Relation Age of Onset     Circulatory Mother         phelbities     Genetic Disorder Mother         factor V leiden     Heart Disease Father         dysrythmia's     Glaucoma Father      Unknown/Adopted Maternal Grandmother      Gynecology Maternal Grandfather      Cerebrovascular Disease Paternal Grandmother      Cerebrovascular Disease Paternal Grandfather      Alcohol/Drug Brother         alcohol and drugs     Anxiety Disorder Brother      Substance Abuse Brother      Alcohol/Drug Sister         alcohol and drugs     Anxiety Disorder Sister      Substance Abuse Sister      Heart Disease Son         clogged arteries     Cardiovascular Son         sudden heart attack     Breast Cancer Cousin      Colon Cancer Other      Macular Degeneration No family hx of          Current Outpatient Medications   Medication Sig Dispense Refill     azaTHIOprine (IMURAN) 50 MG tablet Take 1 tablet by mouth daily       CALCIUM 500 + D 500-200 MG-IU OR TABS 1 TABLET DAILY  0     latanoprost (XALATAN) 0.005 % ophthalmic solution Place 1 drop into both eyes every evening 7.5 mL 4     MULTI-VITAMIN OR TABS 1 TABLET DAILY  0     OMEGA 3-6-9 FATTY ACIDS OR 1 TABLET DAILY       omeprazole (PRILOSEC) 20 MG DR capsule Take 1 capsule (20 mg) by mouth daily 90 capsule 3     rivaroxaban ANTICOAGULANT (XARELTO ANTICOAGULANT) 20 MG TABS tablet Take 1 tablet (20 mg) by mouth daily (with dinner) 90 tablet 3     timolol maleate (TIMOPTIC) 0.5 % ophthalmic solution Place 1 drop into both eyes every morning Wait at least 5 minutes between drops if using more than one at a time. 15 mL 4     VITAMIN B-12 1000 MCG/ML IJ SOLN 1000 MCG Monthly  0     VITAMIN-B COMPLEX OR TABS 1 tablet daily       IMURAN 50 MG OR  TABS 1 TABLET DAILY       Allergies   Allergen Reactions     Penicillins Hives     Throat closed     Recent Labs   Lab Test 05/06/22  1135 04/06/21  0000 09/28/20  0000 05/18/20  0000 02/24/20  0000 11/01/19  1056 11/27/18  1220 10/16/17  1042   *  --   --   --   --  78  --  108*   HDL 70  --   --   --   --  60  --  62   TRIG 144  --   --   --   --  225*  --  187*   ALT  --  23 23 22   < >  --  22  --    CR 0.82  --   --   --   --  0.70 0.71  --    GFRESTIMATED 74  --   --   --   --  85 81  --    GFRESTBLACK  --   --   --   --   --  >90 >90  --    POTASSIUM 4.8  --   --   --   --  4.2 3.9  --     < > = values in this interval not displayed.          Mammogram Screening: Mammogram Screening - Patient over age 75, has elected to continue with screening.    Pertinent mammograms are reviewed under the imaging tab.    Review of Systems   Constitutional:  Negative for chills and fever.   HENT:  Positive for hearing loss. Negative for congestion, ear pain and sore throat.    Eyes:  Negative for pain and visual disturbance.   Respiratory:  Negative for cough and shortness of breath.    Cardiovascular:  Negative for chest pain, palpitations and peripheral edema.   Gastrointestinal:  Negative for abdominal pain, constipation, diarrhea, heartburn, hematochezia and nausea.   Breasts:  Negative for tenderness, breast mass and discharge.   Genitourinary:  Negative for dysuria, frequency, genital sores, hematuria, pelvic pain, urgency, vaginal bleeding and vaginal discharge.   Musculoskeletal:  Positive for arthralgias. Negative for joint swelling and myalgias.   Skin:  Negative for rash.   Neurological:  Negative for dizziness, weakness, headaches and paresthesias.   Psychiatric/Behavioral:  Negative for mood changes. The patient is not nervous/anxious.      Constitutional, HEENT, cardiovascular, pulmonary, gi and gu systems are negative, except as otherwise noted.    OBJECTIVE:   /81   Pulse 64   Resp 16   Wt  "55.7 kg (122 lb 12.8 oz)   SpO2 99%   BMI 21.58 kg/m   Estimated body mass index is 23.02 kg/m  as calculated from the following:    Height as of 5/6/22: 1.607 m (5' 3.25\").    Weight as of 5/15/23: 59.4 kg (131 lb).  Physical Exam  GENERAL: healthy, alert and no distress  EYES: Eyes grossly normal to inspection, PERRL and conjunctivae and sclerae normal  HENT: ear canals and TM's normal, nose and mouth without ulcers or lesions  NECK: no adenopathy, no asymmetry, masses, or scars and thyroid normal to palpation  RESP: lungs clear to auscultation - no rales, rhonchi or wheezes  BREAST: normal   CV: regular rate and rhythm, normal S1 S2, no S3 or S4, no murmur, click or rub, no peripheral edema and peripheral pulses strong  ABDOMEN: soft, nontender, no hepatosplenomegaly, no masses and bowel sounds normal  MS: no gross musculoskeletal defects noted, no edema  SKIN: no suspicious lesions or rashes  NEURO: Normal strength and tone, mentation intact and speech normal  PSYCH: mentation appears normal, affect normal/bright    Diagnostic Test Results:  Labs reviewed in Epic  Orders Placed This Encounter   Procedures     REVIEW OF HEALTH MAINTENANCE PROTOCOL ORDERS     MA SCREENING DIGITAL BILAT - Future  (s+30)     Lipid panel reflex to direct LDL Fasting         ASSESSMENT / PLAN:   (Z12.31) Visit for screening mammogram  (primary encounter diagnosis)  Comment: routine screening   Plan: MA SCREENING DIGITAL BILAT - Future  (s+30)            (Z00.00) Encounter for Medicare annual wellness exam  Comment: routine screening issues   Plan: see orders section of this encounter     (Z23) Need for diphtheria-tetanus-pertussis (Tdap) vaccine  Comment: postponed   Plan:     (Z23) Need for shingles vaccine  Comment: postponed   Plan:     (D68.51) Factor V Leiden Heterozygote  Comment: problem is stable and ongoing monitoring    Plan: rivaroxaban ANTICOAGULANT (XARELTO         ANTICOAGULANT) 20 MG TABS tablet            (Z86.718) " Personal history of DVT (deep vein thrombosis)  Comment: problem is stable and ongoing monitoring    Plan: rivaroxaban ANTICOAGULANT (XARELTO         ANTICOAGULANT) 20 MG TABS tablet            (K50.818) Crohn's disease of both small and large intestine with other complication (H)  Comment: patient continues with treatment with azaTHIOprine (IMURAN) and sees the [facility] for her laboratory studies which were all completed recently. She has two times a year laboratory studies and we agreed to fax over today's orders for a fasting lipid panel and these can be collected with [facility]   Plan: omeprazole (PRILOSEC) 20 MG DR capsule            (Z13.6) CARDIOVASCULAR SCREENING; LDL GOAL LESS THAN 130  Comment: routine screening   Plan: Lipid panel reflex to direct LDL Fasting            Patient has been advised of split billing requirements and indicates understanding: Yes      COUNSELING:  Reviewed preventive health counseling, as reflected in patient instructions        She reports that she quit smoking about 15 years ago. Her smoking use included cigarettes. She has a 19.00 pack-year smoking history. She has never used smokeless tobacco.      Appropriate preventive services were discussed with this patient, including applicable screening as appropriate for cardiovascular disease, diabetes, osteopenia/osteoporosis, and glaucoma.  As appropriate for age/gender, discussed screening for colorectal cancer, prostate cancer, breast cancer, and cervical cancer. Checklist reviewing preventive services available has been given to the patient.    Reviewed patients plan of care and provided an AVS. The Basic Care Plan (routine screening as documented in Health Maintenance) for Kathya meets the Care Plan requirement. This Care Plan has been established and reviewed with the Patient.          Navi Justice MD  [facility]    Identified Health Risks:  I have  reviewed Opioid Use Disorder and Substance Use Disorder risk factors and made any needed referrals.

## 2023-08-10 NOTE — PATIENT INSTRUCTIONS
Patient Education   Personalized Prevention Plan  You are due for the preventive services outlined below.  Your care team is available to assist you in scheduling these services.  If you have already completed any of these items, please share that information with your care team to update in your medical record.  Health Maintenance Due   Topic Date Due     Zoster (Shingles) Vaccine (1 of 2) Never done     ANNUAL REVIEW OF HM ORDERS  11/22/2022     Mammogram  01/10/2023     Annual Wellness Visit  05/06/2023     Diptheria Tetanus Pertussis (DTAP/TDAP/TD) Vaccine (2 - Td or Tdap) 05/31/2023

## 2023-08-31 ENCOUNTER — OFFICE VISIT (OUTPATIENT)
Dept: OPHTHALMOLOGY | Facility: CLINIC | Age: 78
End: 2023-08-31
Payer: COMMERCIAL

## 2023-08-31 DIAGNOSIS — H40.1131 PRIMARY OPEN ANGLE GLAUCOMA (POAG) OF BOTH EYES, MILD STAGE: Primary | ICD-10-CM

## 2023-08-31 PROCEDURE — 92012 INTRM OPH EXAM EST PATIENT: CPT | Performed by: OPHTHALMOLOGY

## 2023-08-31 ASSESSMENT — TONOMETRY
IOP_METHOD: APPLANATION
OD_IOP_MMHG: 13
OS_IOP_MMHG: 12

## 2023-08-31 ASSESSMENT — SLIT LAMP EXAM - LIDS
COMMENTS: 2-3+ DERMATOCHALASIS - UPPER LID
COMMENTS: 2-3+ DERMATOCHALASIS - UPPER LID

## 2023-08-31 ASSESSMENT — VISUAL ACUITY
OD_SC+: -2
OS_PH_SC+: -2
OD_SC: 20/30
OS_SC: 20/40
METHOD: SNELLEN - LINEAR
OS_SC+: +2
OS_PH_SC: 20/30

## 2023-08-31 NOTE — PATIENT INSTRUCTIONS
Continue:   Latanoprost (green top) every evening both eyes.  Timolol (yellow top) every morning both eyes.  Nasolacrimal sac occlusion for 1-2 minutes after drops     May use artificial tears up to 4 times daily both eyes. (Refresh Tears, Systane Ultra/Balance, or Theratears)     Wait at least 5 minutes between drops if using more than one at a time.     Return visit in 7 months for a complete exam.     Maxx Roque M.D.  [phone]

## 2023-08-31 NOTE — PROGRESS NOTES
Current Eye Medications:  timolol - both eyes QAM - last dose: 630am today  Latanoprost - both eyes at bedtime - last dose: 745pm last night     Subjective:  3 mo IOP check - consistent with daily use of latanoprost and timolol.  Overall vision is stable both eyes.    Has upper and lower lid surgery scheduled w AM on 9/18/23.      Objective:  See Ophthalmology Exam.       Assessment:  Intraocular pressure continues to do well on current drop regimen.      Plan:  Continue:   Latanoprost (green top) every evening both eyes.  Timolol (yellow top) every morning both eyes.  Nasolacrimal sac occlusion for 1-2 minutes after drops     May use artificial tears up to 4 times daily both eyes. (Refresh Tears, Systane Ultra/Balance, or Theratears)     Wait at least 5 minutes between drops if using more than one at a time.     Return visit in 7 months for a complete exam.     Maxx Roque M.D.  [phone]

## 2023-08-31 NOTE — LETTER
8/31/2023         RE: Kathya Breaux  [address]  [place] [address]        Dear Colleague,    Thank you for referring your patient, Kathya Breaux, to the [facility]. Please see a copy of my visit note below.     Current Eye Medications:  timolol - both eyes QAM - last dose: 630am today  Latanoprost - both eyes at bedtime - last dose: 745pm last night     Subjective:  3 mo IOP check - consistent with daily use of latanoprost and timolol.  Overall vision is stable both eyes.    Has upper and lower lid surgery scheduled w AM on 9/18/23.      Objective:  See Ophthalmology Exam.       Assessment:  Intraocular pressure continues to do well on current drop regimen.      Plan:  Continue:   Latanoprost (green top) every evening both eyes.  Timolol (yellow top) every morning both eyes.  Nasolacrimal sac occlusion for 1-2 minutes after drops     May use artificial tears up to 4 times daily both eyes. (Refresh Tears, Systane Ultra/Balance, or Theratears)     Wait at least 5 minutes between drops if using more than one at a time.     Return visit in 7 months for a complete exam.     Maxx Roque M.D.  [phone]        Again, thank you for allowing me to participate in the care of your patient.        Sincerely,        Maxx Roque MD

## 2023-09-08 ENCOUNTER — OFFICE VISIT (OUTPATIENT)
Dept: FAMILY MEDICINE | Facility: CLINIC | Age: 78
End: 2023-09-08
Payer: COMMERCIAL

## 2023-09-08 VITALS
HEIGHT: 64 IN | SYSTOLIC BLOOD PRESSURE: 138 MMHG | OXYGEN SATURATION: 98 % | HEART RATE: 86 BPM | TEMPERATURE: 98 F | BODY MASS INDEX: 20.83 KG/M2 | DIASTOLIC BLOOD PRESSURE: 79 MMHG | RESPIRATION RATE: 18 BRPM | WEIGHT: 122 LBS

## 2023-09-08 DIAGNOSIS — D68.51 FACTOR V LEIDEN MUTATION (H): ICD-10-CM

## 2023-09-08 DIAGNOSIS — Z01.818 PREOP GENERAL PHYSICAL EXAM: Primary | ICD-10-CM

## 2023-09-08 DIAGNOSIS — H02.413 MECHANICAL PTOSIS OF EYELID OF BOTH EYES: ICD-10-CM

## 2023-09-08 DIAGNOSIS — Z86.718 PERSONAL HISTORY OF DVT (DEEP VEIN THROMBOSIS): ICD-10-CM

## 2023-09-08 PROCEDURE — 99214 OFFICE O/P EST MOD 30 MIN: CPT | Performed by: PHYSICIAN ASSISTANT

## 2023-09-08 NOTE — PATIENT INSTRUCTIONS
For informational purposes only. Not to replace the advice of your health care provider. Copyright   2003,  [employer]. All rights reserved. Clinically reviewed by Sugey Santiago MD. [employer] [id number] - REV .  Preparing for Your Surgery  Getting started  A nurse will call you to review your health history and instructions. They will give you an arrival time based on your scheduled surgery time. Please be ready to share:  Your doctor's clinic name and phone number  Your medical, surgical, and anesthesia history  A list of allergies and sensitivities  A list of medicines, including herbal treatments and over-the-counter drugs  Whether the patient has a legal guardian (ask how to send us the papers in advance)  Please tell us if you're pregnant--or if there's any chance you might be pregnant. Some surgeries may injure a fetus (unborn baby), so they require a pregnancy test. Surgeries that are safe for a fetus don't always need a test, and you can choose whether to have one.   If you have a child who's having surgery, please ask for a copy of Preparing for Your Child's Surgery.    Preparing for surgery  Within 10 to 30 days of surgery: Have a pre-op exam (sometimes called an H&P, or History and Physical). This can be done at a clinic or pre-operative center.  If you're having a , you may not need this exam. Talk to your care team.  At your pre-op exam, talk to your care team about all medicines you take. If you need to stop any medicines before surgery, ask when to start taking them again.  We do this for your safety. Many medicines can make you bleed too much during surgery. Some change how well surgery (anesthesia) drugs work.  Call your insurance company to let them know you're having surgery. (If you don't have insurance, call [phone].)  Call your clinic if there's any change in your health. This includes signs of a cold or flu (sore throat, runny nose, cough, rash, fever). It also  includes a scrape or scratch near the surgery site.  If you have questions on the day of surgery, call your hospital or surgery center.  Eating and drinking guidelines  For your safety: Unless your surgeon tells you otherwise, follow the guidelines below.  Eat and drink as usual until 8 hours before you arrive for surgery. After that, no food or milk.  Drink clear liquids until 2 hours before you arrive. These are liquids you can see through, like water, Gatorade, and Propel Water. They also include plain black coffee and tea (no cream or milk), candy, and breath mints. You can spit out gum when you arrive.  If you drink alcohol: Stop drinking it the night before surgery.  If your care team tells you to take medicine on the morning of surgery, it's okay to take it with a sip of water.  Preventing infection  Shower or bathe the night before and morning of your surgery. Follow the instructions your clinic gave you. (If no instructions, use regular soap.)  Don't shave or clip hair near your surgery site. We'll remove the hair if needed.  Don't smoke or vape the morning of surgery. You may chew nicotine gum up to 2 hours before surgery. A nicotine patch is okay.  Note: Some surgeries require you to completely quit smoking and nicotine. Check with your surgeon.  Your care team will make every effort to keep you safe from infection. We will:  Clean our hands often with soap and water (or an alcohol-based hand rub).  Clean the skin at your surgery site with a special soap that kills germs.  Give you a special gown to keep you warm. (Cold raises the risk of infection.)  Wear special hair covers, masks, gowns and gloves during surgery.  Give antibiotic medicine, if prescribed. Not all surgeries need antibiotics.  What to bring on the day of surgery  Photo ID and insurance card  Copy of your health care directive, if you have one  Glasses and hearing aids (bring cases)  You can't wear contacts during surgery  Inhaler and eye  drops, if you use them (tell us about these when you arrive)  CPAP machine or breathing device, if you use them  A few personal items, if spending the night  If you have . . .  A pacemaker, ICD (cardiac defibrillator) or other implant: Bring the ID card.  An implanted stimulator: Bring the remote control.  A legal guardian: Bring a copy of the certified (court-stamped) guardianship papers.  Please remove any jewelry, including body piercings. Leave jewelry and other valuables at home.  If you're going home the day of surgery  You must have a responsible adult drive you home. They should stay with you overnight as well.  If you don't have someone to stay with you, and you aren't safe to go home alone, we may keep you overnight. Insurance often won't pay for this.  After surgery  If it's hard to control your pain or you need more pain medicine, please call your surgeon's office.  Questions?   If you have any questions for your care team, list them here: _________________________________________________________________________________________________________________________________________________________________________ ____________________________________ ____________________________________ ____________________________________    How to Take Your Medication Before Surgery  - stop xarelto one day before

## 2023-09-08 NOTE — PROGRESS NOTES
[facility]  [address]  [place] [address]  Phone: [phone]  Primary Provider: Navi Justice  Pre-op Performing Provider: ROSA CLAY      PREOPERATIVE EVALUATION:  Today's date: 9/8/2023    Kathya Breaux is a 77 year old female who presents for a preoperative evaluation.      9/8/2023     9:17 AM   Additional Questions   Roomed by Saira   Accompanied by self       Surgical Information:  Surgery/Procedure:B upper eyelid blepharoplasty,ptosis repair and poss cosmetic B lower eyelid blepharoplasty   Surgery Location: [place]   Surgeon: Rohit Vigil   Surgery Date: 9-18-23   Time of Surgery: 8:30 am   Where patient plans to recover: At home with family  Fax number for surgical facility:     Assessment & Plan     The proposed surgical procedure is considered INTERMEDIATE risk.    Factor V Leiden Heterozygote  Ok to stop xarelto one day before.      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 HOLD 1 day before surgery.      Additional Medication Instructions:  Patient is to take all scheduled medications on the day of surgery    RECOMMENDATION:  APPROVAL GIVEN to proceed with proposed procedure, without further diagnostic evaluation.            Subjective       HPI related to upcoming procedure: eye lids are affecting vision         9/1/2023     8:47 AM   Preop Questions   1. Have you ever had a heart attack or stroke? No   2. Have you ever had surgery on your heart or blood vessels, such as a stent placement, a coronary artery bypass, or surgery on an artery in your  head, neck, heart, or legs? No   3. Do you have chest pain with activity? No   4. Do you have a history of  heart failure? No   5. Do you currently have a cold, bronchitis or symptoms of other infection? No   6. Do you have a cough, shortness of breath, or wheezing? No   7. Do you or anyone in your family have previous history of blood clots? YES - person    8. Do you or does anyone in your family have a serious bleeding problem such as prolonged bleeding following surgeries or cuts? No   9. Have you ever had problems with anemia or been told to take iron pills? No   10. Have you had any abnormal blood loss such as black, tarry or bloody stools, or abnormal vaginal bleeding? No   11. Have you ever had a blood transfusion? YES - years ago    11a. Have you ever had a transfusion reaction? No   12. Are you willing to have a blood transfusion if it is medically needed before, during, or after your surgery? Yes   13. Have you or any of your relatives ever had problems with anesthesia? No   14. Do you have sleep apnea, excessive snoring or daytime drowsiness? No   15. Do you have any artifical heart valves or other implanted medical devices like a pacemaker, defibrillator, or continuous glucose monitor? No   16. Do you have artificial joints? No   17. Are you allergic to latex? No       Health Care Directive:  Patient does not have a Health Care Directive or Living Will: Patient states has Advance Directive and will bring in a copy to clinic.    Preoperative Review of :   reviewed - no record of controlled substances prescribed.          Review of Systems  CONSTITUTIONAL: NEGATIVE for fever, chills, change in weight  INTEGUMENTARY/SKIN: NEGATIVE for worrisome rashes, moles or lesions  EYES: vision changes due to eyelids  ENT/MOUTH: NEGATIVE for ear, mouth and throat problems  RESP: NEGATIVE for significant cough or SOB  CV: NEGATIVE for chest pain, palpitations or peripheral edema  GI: NEGATIVE for nausea, abdominal  pain, heartburn, or change in bowel habits  : NEGATIVE for frequency, dysuria, or hematuria  MUSCULOSKELETAL: NEGATIVE for significant arthralgias or myalgia  ENDOCRINE: NEGATIVE for temperature intolerance, skin/hair changes  HEME/ALLERGY/IMMUNE: hx of clotting disorder     Patient Active Problem List    Diagnosis Date Noted    Dermatochalasis of both upper eyelids 05/24/2023     Priority: Medium     Added automatically from request for surgery [id number]      Acquired involutional ptosis of eyelid, bilateral 05/24/2023     Priority: Medium     Added automatically from request for surgery [id number]      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 05/24/2023     Priority: Medium     Added automatically from request for surgery [id number]      Primary open angle glaucoma (POAG) of both eyes 04/18/2022     Priority: Medium    Combined forms of age-related cataract, mild, of both eyes 03/14/2022     Priority: Medium    Age-related osteoporosis without current pathological fracture 11/19/2018     Priority: Medium    Crohn's disease of both small and large intestine with other complication (H) 10/03/2017     Priority: Medium    Posterior vitreous detachment, bilateral 10/26/2016     Priority: Medium    Hx of LASIK 10/26/2016     Priority: Medium    Long-term (current) use of anticoagulants [Z79.01] 03/11/2016     Priority: Medium    CARDIOVASCULAR SCREENING; LDL GOAL LESS THAN 130 10/31/2010     Priority: Medium     age, fhx      Factor V Leiden Heterozygote 03/09/2010     Priority: Medium    Personal history of DVT (deep vein thrombosis) 02/16/2010     Priority: Medium      Past Medical History:   Diagnosis Date    Arthritis     CARDIOVASCULAR SCREENING; LDL GOAL LESS THAN 130 10/31/2010    age, fhx    DVT of lower extremity (deep venous thrombosis) (H) 11/2009    rt. heterozygote. has seen heme-onc    Ex-cigarette smoker     Factor V Leiden Heterozygote     Factor V Leiden mutation (H)     heterozygote. has seen heme-onc     "Glaucoma (increased eye pressure)     Glaucoma suspect, bilateral     History of blood transfusion 1969    Nonsenile cataract     Regional enteritis of small intestine with large intestine (H)     chrohn's. [facility]     Past Surgical History:   Procedure Laterality Date    ABDOMEN SURGERY  3 events    Bowel resec. in 1964, bowel resec in 1969    APPENDECTOMY      first bowel surgery    COLONOSCOPY  05/2016    Q 5 years     ENHANCE LASER REFRACTIVE BILATERAL EXISTING PT IN PARAMETERS      EYE SURGERY  2001    lasik surgery    GYN SURGERY  1980    hysterectomy    ZZC NONSPECIFIC PROCEDURE  1964    3 1/2\" sm. intestine removed-illeitis    ZZC NONSPECIFIC PROCEDURE  1969    4\" sm. intestine removed-illeitis    ZZC NONSPECIFIC PROCEDURE  1980    Hysterectomy     Current Outpatient Medications   Medication Sig Dispense Refill    azaTHIOprine (IMURAN) 50 MG tablet Take 1 tablet by mouth daily      CALCIUM 500 + D 500-200 MG-IU OR TABS 1 TABLET DAILY  0    latanoprost (XALATAN) 0.005 % ophthalmic solution Place 1 drop into both eyes every evening 7.5 mL 4    MULTI-VITAMIN OR TABS 1 TABLET DAILY  0    OMEGA 3-6-9 FATTY ACIDS OR 1 TABLET DAILY      omeprazole (PRILOSEC) 20 MG DR capsule Take 1 capsule (20 mg) by mouth daily 90 capsule 3    rivaroxaban ANTICOAGULANT (XARELTO ANTICOAGULANT) 20 MG TABS tablet Take 1 tablet (20 mg) by mouth daily (with dinner) 90 tablet 3    timolol maleate (TIMOPTIC) 0.5 % ophthalmic solution Place 1 drop into both eyes every morning Wait at least 5 minutes between drops if using more than one at a time. 15 mL 4    VITAMIN B-12 1000 MCG/ML IJ SOLN 1000 MCG Monthly  0    VITAMIN-B COMPLEX OR TABS 1 tablet daily         Allergies   Allergen Reactions    Penicillins Hives     Throat closed        Social History     Tobacco Use    Smoking status: Former     Packs/day: 0.50     Years: 38.00     Pack years: 19.00     Types: Cigarettes     Quit date: 3/1/2008     Years since quitting: 15.5    " "Smokeless tobacco: Never    Tobacco comments:     6 cigs per day   Substance Use Topics    Alcohol use: No     Family History   Problem Relation Age of Onset    Circulatory Mother         phelbities    Genetic Disorder Mother         factor V leiden    Heart Disease Father         dysrythmia's    Glaucoma Father     Unknown/Adopted Maternal Grandmother     Gynecology Maternal Grandfather     Cerebrovascular Disease Paternal Grandmother     Cerebrovascular Disease Paternal Grandfather     Alcohol/Drug Brother         alcohol and drugs    Anxiety Disorder Brother     Substance Abuse Brother     Alcohol/Drug Sister         alcohol and drugs    Anxiety Disorder Sister     Substance Abuse Sister     Heart Disease Son         clogged arteries    Cardiovascular Son         sudden heart attack    Breast Cancer Cousin     Colon Cancer Other     Macular Degeneration No family hx of      History   Drug Use No         Objective     /79   Pulse 86   Temp 98  F (36.7  C) (Oral)   Resp 18   Ht 1.613 m (5' 3.5\")   Wt 55.3 kg (122 lb)   SpO2 98%   BMI 21.27 kg/m      Physical Exam    GENERAL APPEARANCE: healthy, alert and no distress     HENT: ear canals and TM's normal and nose and mouth without ulcers or lesions     NECK: no adenopathy, no asymmetry, masses, or scars and thyroid normal to palpation     RESP: lungs clear to auscultation - no rales, rhonchi or wheezes     CV: regular rates and rhythm, normal S1 S2, no S3 or S4 and no murmur, click or rub     ABDOMEN:  soft, nontender, no HSM or masses and bowel sounds normal     MS: extremities normal- no gross deformities noted, no evidence of inflammation in joints, FROM in all extremities.     SKIN: no suspicious lesions or rashes     NEURO: Normal strength and tone, sensory exam grossly normal, mentation intact and speech normal     PSYCH: mentation appears normal. and affect normal/bright     LYMPHATICS: No cervical adenopathy    Recent Labs   Lab Test " 05/06/22  1135   HGB 14.0         POTASSIUM 4.8   CR 0.82        Diagnostics:  No labs were ordered during this visit.   No EKG required, no history of coronary heart disease, significant arrhythmia, peripheral arterial disease or other structural heart disease.    Revised Cardiac Risk Index (RCRI):  The patient has the following serious cardiovascular risks for perioperative complications:   - No serious cardiac risks = 0 points     RCRI Interpretation: 0 points: Class I (very low risk - 0.4% complication rate)         Signed Electronically by: Rody Petit PA-C  Copy of this evaluation report is provided to requesting physician.

## 2023-09-14 ENCOUNTER — ANESTHESIA EVENT (OUTPATIENT)
Dept: SURGERY | Facility: AMBULATORY SURGERY CENTER | Age: 78
End: 2023-09-14
Payer: COMMERCIAL

## 2023-09-14 NOTE — ANESTHESIA PREPROCEDURE EVALUATION
"Anesthesia Pre-Procedure Evaluation    Patient: Kathya Breaux   MRN: [id number] : 1945        Procedure : Procedure(s):  Bilateral upper eyeldi blepharoplasty, ptosis repair, and  Possible cosmetic bilateral lower eyelid blepharoplasty  bilateral lower eyelid ectropion repair          Past Medical History:   Diagnosis Date    Arthritis     CARDIOVASCULAR SCREENING; LDL GOAL LESS THAN 130 10/31/2010    age, fhx    DVT of lower extremity (deep venous thrombosis) (H) 2009    rt. heterozygote. has seen heme-onc    Ex-cigarette smoker     Factor V Leiden Heterozygote     Factor V Leiden mutation (H)     heterozygote. has seen heme-onc    Glaucoma (increased eye pressure)     Glaucoma suspect, bilateral     History of blood transfusion     Nonsenile cataract     Regional enteritis of small intestine with large intestine (H)     [facility]. [facility]      Past Surgical History:   Procedure Laterality Date    ABDOMEN SURGERY  3 events    Bowel resec. in , bowel resec in     APPENDECTOMY      first bowel surgery    COLONOSCOPY  2016    Q 5 years     ENHANCE LASER REFRACTIVE BILATERAL EXISTING PT IN PARAMETERS      EYE SURGERY      lasik surgery    GYN SURGERY      hysterectomy    ZZC NONSPECIFIC PROCEDURE  1964    3 1/2\" sm. intestine removed-illeitis    ZZC NONSPECIFIC PROCEDURE      4\" sm. intestine removed-illeitis    ZZC NONSPECIFIC PROCEDURE      Hysterectomy      Allergies   Allergen Reactions    Penicillins Hives     Throat closed      Social History     Tobacco Use    Smoking status: Former     Packs/day: 0.50     Years: 38.00     Pack years: 19.00     Types: Cigarettes     Quit date: 3/1/2008     Years since quitting: 15.5    Smokeless tobacco: Never    Tobacco comments:     6 cigs per day   Substance Use Topics    Alcohol use: No      Wt Readings from Last 1 Encounters:   23 55.3 kg (122 lb)        Anesthesia Evaluation            ROS/MED HX  ENT/Pulmonary: "  - neg pulmonary ROS     Neurologic:  - neg neurologic ROS     Cardiovascular:  - neg cardiovascular ROS  (-) murmur   METS/Exercise Tolerance: >4 METS    Hematologic:     (+) History of blood clots (held xarelto since Friday),    pt is anticoagulated,           Musculoskeletal:  - neg musculoskeletal ROS     GI/Hepatic:     (+)       Inflammatory bowel disease,             Renal/Genitourinary:  - neg Renal ROS     Endo:  - neg endo ROS     Psychiatric/Substance Use:  - neg psychiatric ROS     Infectious Disease:  - neg infectious disease ROS     Malignancy:  - neg malignancy ROS     Other:  - neg other ROS          Physical Exam    Airway        Mallampati: I   TM distance: > 3 FB   Neck ROM: full   Mouth opening: > 3 cm    Respiratory Devices and Support         Dental       (+) Removable bridges or other hardware      Cardiovascular          Rhythm and rate: regular and normal (-) no murmur    Pulmonary   pulmonary exam normal        breath sounds clear to auscultation           OUTSIDE LABS:  CBC:   Lab Results   Component Value Date    WBC 7.5 05/06/2022    WBC 7.8 11/09/2020    HGB 14.0 05/06/2022    HGB 12.9 11/09/2020    HCT 40.0 05/06/2022    HCT 38.0 11/09/2020     05/06/2022     11/09/2020     BMP:   Lab Results   Component Value Date     05/06/2022     11/01/2019    POTASSIUM 4.8 05/06/2022    POTASSIUM 4.2 11/01/2019    CHLORIDE 108 05/06/2022    CHLORIDE 112 (H) 11/01/2019    CO2 29 05/06/2022    CO2 27 11/01/2019    BUN 13 05/06/2022    BUN 13 11/01/2019    CR 0.82 05/06/2022    CR 0.70 11/01/2019    GLC 99 05/06/2022    GLC 85 11/01/2019     COAGS:   Lab Results   Component Value Date    INR 2.02 (H) 11/27/2018    FIBR 391 02/16/2010     POC: No results found for: BGM, HCG, HCGS  HEPATIC:   Lab Results   Component Value Date    ALBUMIN 3.9 11/27/2018    PROTTOTAL 7.2 11/27/2018    ALT 23 04/06/2021    AST 19 04/06/2021    ALKPHOS 74 11/27/2018    BILITOTAL 0.5 11/27/2018     BILIDIRECT 0.1 09/09/2013     OTHER:   Lab Results   Component Value Date    PACO 10.1 05/06/2022    PHOS 3.2 05/06/2022    MAG 1.9 05/06/2022    CRP 5.4 08/07/2017    SED 13 07/31/2017       Anesthesia Plan    ASA Status:  2    NPO Status:  NPO Appropriate    Anesthesia Type: General.     - Airway: LMA   Induction: Intravenous, Propofol.   Maintenance: Balanced.        Consents    Anesthesia Plan(s) and associated risks, benefits, and realistic alternatives discussed. Questions answered and patient/representative(s) expressed understanding.     - Discussed:     - Discussed with:  Patient      - Extended Intubation/Ventilatory Support Discussed: No.      - Patient is DNR/DNI Status: No     Use of blood products discussed: No .     Postoperative Care    Pain management: IV analgesics, Oral pain medications, Multi-modal analgesia.   PONV prophylaxis: Ondansetron (or other 5HT-3), Dexamethasone or Solumedrol, Background Propofol Infusion     Comments:    Other Comments: Discussed plan for general anesthetic with LMA. Discussed risks of sore throat, post op pain/nausea, oropharyngeal damage, rare major complications.         H&P reviewed: Unable to attach H&P to encounter due to EHR limitations. H&P Update: appropriate H&P reviewed, patient examined. No interval changes since H&P (within 30 days).         Franki Ramon MD

## 2023-09-18 ENCOUNTER — HOSPITAL ENCOUNTER (OUTPATIENT)
Facility: AMBULATORY SURGERY CENTER | Age: 78
Discharge: HOME | End: 2023-09-18
Attending: OPHTHALMOLOGY | Admitting: OPHTHALMOLOGY
Payer: COMMERCIAL

## 2023-09-18 ENCOUNTER — ANESTHESIA (OUTPATIENT)
Dept: SURGERY | Facility: AMBULATORY SURGERY CENTER | Age: 78
End: 2023-09-18
Payer: COMMERCIAL

## 2023-09-18 ENCOUNTER — APPOINTMENT (OUTPATIENT)
Dept: ADMISSION | Facility: AMBULATORY SURGERY CENTER | Age: 78
End: 2023-09-18

## 2023-09-18 VITALS
HEART RATE: 58 BPM | DIASTOLIC BLOOD PRESSURE: 72 MMHG | WEIGHT: 122 LBS | BODY MASS INDEX: 21.27 KG/M2 | RESPIRATION RATE: 18 BRPM | TEMPERATURE: 97.5 F | SYSTOLIC BLOOD PRESSURE: 155 MMHG | OXYGEN SATURATION: 98 %

## 2023-09-18 DIAGNOSIS — H02.834 DERMATOCHALASIS OF BOTH UPPER EYELIDS: ICD-10-CM

## 2023-09-18 DIAGNOSIS — H02.831 DERMATOCHALASIS OF BOTH UPPER EYELIDS: ICD-10-CM

## 2023-09-18 DIAGNOSIS — H02.413 MECHANICAL PTOSIS OF EYELID OF BOTH EYES: ICD-10-CM

## 2023-09-18 DIAGNOSIS — H02.403 ACQUIRED INVOLUTIONAL PTOSIS OF EYELID, BILATERAL: ICD-10-CM

## 2023-09-18 PROCEDURE — 67903 REPAIR EYELID DEFECT: CPT | Mod: 50 | Performed by: OPHTHALMOLOGY

## 2023-09-18 PROCEDURE — 67908 REPAIR EYELID DEFECT: CPT | Mod: E3

## 2023-09-18 PROCEDURE — G8918 PT W/O PREOP ORDER IV AB PRO: HCPCS

## 2023-09-18 PROCEDURE — G8907 PT DOC NO EVENTS ON DISCHARG: HCPCS

## 2023-09-18 PROCEDURE — 67917 REPAIR EYELID DEFECT: CPT | Mod: E2

## 2023-09-18 PROCEDURE — 360N000050 HC SURGERY LEVEL COSMETIC 30 MIN

## 2023-09-18 PROCEDURE — 67917 REPAIR EYELID DEFECT: CPT | Mod: 59 | Performed by: OPHTHALMOLOGY

## 2023-09-18 PROCEDURE — 96999 UNLISTED SPEC DERM SVC/PX: CPT | Performed by: OPHTHALMOLOGY

## 2023-09-18 RX ORDER — PROPOFOL 10 MG/ML
INJECTION, EMULSION INTRAVENOUS PRN
Status: DISCONTINUED | OUTPATIENT
Start: 2023-09-18 | End: 2023-09-18

## 2023-09-18 RX ORDER — ONDANSETRON 2 MG/ML
4 INJECTION INTRAMUSCULAR; INTRAVENOUS EVERY 30 MIN PRN
Status: DISCONTINUED | OUTPATIENT
Start: 2023-09-18 | End: 2023-09-19 | Stop reason: HOSPADM

## 2023-09-18 RX ORDER — SODIUM CHLORIDE, SODIUM LACTATE, POTASSIUM CHLORIDE, CALCIUM CHLORIDE 600; 310; 30; 20 MG/100ML; MG/100ML; MG/100ML; MG/100ML
INJECTION, SOLUTION INTRAVENOUS CONTINUOUS
Status: DISCONTINUED | OUTPATIENT
Start: 2023-09-18 | End: 2023-09-19 | Stop reason: HOSPADM

## 2023-09-18 RX ORDER — ERYTHROMYCIN 5 MG/G
OINTMENT OPHTHALMIC PRN
Status: DISCONTINUED | OUTPATIENT
Start: 2023-09-18 | End: 2023-09-18 | Stop reason: HOSPADM

## 2023-09-18 RX ORDER — OXYCODONE HYDROCHLORIDE 5 MG/1
10 TABLET ORAL
Status: DISCONTINUED | OUTPATIENT
Start: 2023-09-18 | End: 2023-09-19 | Stop reason: HOSPADM

## 2023-09-18 RX ORDER — ACETAMINOPHEN 325 MG/1
975 TABLET ORAL ONCE
Status: COMPLETED | OUTPATIENT
Start: 2023-09-18 | End: 2023-09-18

## 2023-09-18 RX ORDER — ONDANSETRON 4 MG/1
4 TABLET, ORALLY DISINTEGRATING ORAL EVERY 30 MIN PRN
Status: DISCONTINUED | OUTPATIENT
Start: 2023-09-18 | End: 2023-09-19 | Stop reason: HOSPADM

## 2023-09-18 RX ORDER — ERYTHROMYCIN 5 MG/G
OINTMENT OPHTHALMIC
Qty: 3.5 G | Refills: 0 | Status: SHIPPED | OUTPATIENT
Start: 2023-09-18 | End: 2023-09-22

## 2023-09-18 RX ORDER — TETRACAINE HYDROCHLORIDE 5 MG/ML
SOLUTION OPHTHALMIC PRN
Status: DISCONTINUED | OUTPATIENT
Start: 2023-09-18 | End: 2023-09-18 | Stop reason: HOSPADM

## 2023-09-18 RX ORDER — LIDOCAINE HYDROCHLORIDE 20 MG/ML
INJECTION, SOLUTION INFILTRATION; PERINEURAL PRN
Status: DISCONTINUED | OUTPATIENT
Start: 2023-09-18 | End: 2023-09-18

## 2023-09-18 RX ORDER — OXYCODONE HYDROCHLORIDE 5 MG/1
5 TABLET ORAL EVERY 6 HOURS PRN
Qty: 12 TABLET | Refills: 0 | Status: SHIPPED | OUTPATIENT
Start: 2023-09-18 | End: 2023-09-21

## 2023-09-18 RX ORDER — FENTANYL CITRATE 50 UG/ML
25 INJECTION, SOLUTION INTRAMUSCULAR; INTRAVENOUS EVERY 5 MIN PRN
Status: DISCONTINUED | OUTPATIENT
Start: 2023-09-18 | End: 2023-09-19 | Stop reason: HOSPADM

## 2023-09-18 RX ORDER — FENTANYL CITRATE 50 UG/ML
INJECTION, SOLUTION INTRAMUSCULAR; INTRAVENOUS PRN
Status: DISCONTINUED | OUTPATIENT
Start: 2023-09-18 | End: 2023-09-18

## 2023-09-18 RX ORDER — FENTANYL CITRATE 50 UG/ML
50 INJECTION, SOLUTION INTRAMUSCULAR; INTRAVENOUS EVERY 5 MIN PRN
Status: DISCONTINUED | OUTPATIENT
Start: 2023-09-18 | End: 2023-09-19 | Stop reason: HOSPADM

## 2023-09-18 RX ORDER — OXYCODONE HYDROCHLORIDE 5 MG/1
5 TABLET ORAL
Status: COMPLETED | OUTPATIENT
Start: 2023-09-18 | End: 2023-09-18

## 2023-09-18 RX ORDER — NEOMYCIN POLYMYXIN B SULFATES AND DEXAMETHASONE 3.5; 10000; 1 MG/ML; [USP'U]/ML; MG/ML
SUSPENSION/ DROPS OPHTHALMIC
Qty: 5 ML | Refills: 0 | Status: SHIPPED | OUTPATIENT
Start: 2023-09-18 | End: 2023-10-04

## 2023-09-18 RX ORDER — LIDOCAINE 40 MG/G
CREAM TOPICAL
Status: DISCONTINUED | OUTPATIENT
Start: 2023-09-18 | End: 2023-09-19 | Stop reason: HOSPADM

## 2023-09-18 RX ADMIN — OXYCODONE HYDROCHLORIDE 5 MG: 5 TABLET ORAL at 10:33

## 2023-09-18 RX ADMIN — FENTANYL CITRATE 25 MCG: 50 INJECTION, SOLUTION INTRAMUSCULAR; INTRAVENOUS at 08:46

## 2023-09-18 RX ADMIN — ACETAMINOPHEN 975 MG: 325 TABLET ORAL at 07:25

## 2023-09-18 RX ADMIN — LIDOCAINE HYDROCHLORIDE 60 MG: 20 INJECTION, SOLUTION INFILTRATION; PERINEURAL at 08:42

## 2023-09-18 RX ADMIN — SODIUM CHLORIDE, SODIUM LACTATE, POTASSIUM CHLORIDE, CALCIUM CHLORIDE: 600; 310; 30; 20 INJECTION, SOLUTION INTRAVENOUS at 07:37

## 2023-09-18 RX ADMIN — PROPOFOL 100 MG: 10 INJECTION, EMULSION INTRAVENOUS at 08:42

## 2023-09-18 NOTE — ANESTHESIA POSTPROCEDURE EVALUATION
Patient: Kathya Breaux    Procedure: Procedure(s):  Bilateral upper eyelid blepharoplasty, ptosis repair, and  cosmetic bilateral lower eyelid blepharoplasty  bilateral lower eyelid ectropion repair       Anesthesia Type:  General    Note:  Disposition: Outpatient   Postop Pain Control: Uneventful            Sign Out: Well controlled pain   PONV: No   Neuro/Psych: Uneventful            Sign Out: Acceptable/Baseline neuro status   Airway/Respiratory: Uneventful            Sign Out: Acceptable/Baseline resp. status   CV/Hemodynamics: Uneventful            Sign Out: Acceptable CV status; No obvious hypovolemia; No obvious fluid overload   Other NRE:    DID A NON-ROUTINE EVENT OCCUR? No           Last vitals:  Vitals Value Taken Time   /73 09/18/23 1030   Temp     Pulse 58 09/18/23 1030   Resp 18 09/18/23 1030   SpO2 95 % 09/18/23 1030       Electronically Signed By: Franki Ramon MD  September 18, 2023  11:31 AM

## 2023-09-18 NOTE — OP NOTE
PREOPERATIVE DIAGNOSES:   1. Bilateral upper eyelid ptosis and dermatochalasis.   2. Bilateral lower eyelid steatochalasis and dermatochalasis.   3. Bilateral lower eyelid ectropion.   POSTOPERATIVE DIAGNOSES:   1. Bilateral upper eyelid ptosis and dermatochalasis.   2.  Bilateral lower eyelid steatochalasis and dermatochalasis.   3. Bilateral lower eyelid ectropion.   PROCEDURES:   1. Bilateral upper eyelid blepharoplasty.   2. Bilateral upper eyelid ptosis repair posterior advancement of upper eyelid retractors 8 mm right eye and 8.5 mm left eye.   3. Bilateral lower eyelid ectropion repair.   4. Bilateral lower eyelid cosmetic blepharoplasty.   ANESTHESIA: General anesthesia with local infiltration of 1% lidocaine with epinephrine [id number] and 0.5% Marcaine in a 50/50 mixture.   SURGEON: Musa Bee MD  ASSISTANT: None  COMPLICATIONS: None.   ESTIMATED BLOOD LOSS: Less than 5 mL.   INDICATION FOR PROCEDURE: Kathya Breaux  presented with upper lid ptosis and dermatochalasis interfering with the superior visual field and activities of daily living, and also had lower eyelid steatochalasis and dermatochalasis and desired aesthetic correction. After risks, benefits and alternatives to the proposed procedures were explained, informed consent was obtained.   DESCRIPTION OF PROCEDURE: The patient was brought to the operating room and placed supine on the operating table. Under general anesthesia, the upper lid crease and excess upper eyelid skin was marked on each upper eyelid and the upper and lower eyelids infiltrated with local anesthetic, and she was prepped and draped in the typical sterile ophthalmic fashion.     Attention was first directed to the right lower eyelid. Transconjunctival incision was performed in the fornix with monopolar cautery. The herniated orbital fat pads were debulked with monopolar cautery. Care was taken to avoid the inferior oblique muscle during the dissection between the medial  and central fat pads. Hemostasis was obtained with monopolar cautery. Skin was closed with interrupted 6-0 plain gut sutures. A pinch skin incision was then performed using the green fixation forceps to pinch the excess skin inferior to the lash line in the subciliary space. Pinched skin was excised with a Ngoc scissors. Hemostasis was obtained with high temperature cautery and the skin closed with running 6-0 plain gut suture. Attention was directed to the left side, where the same lower lid procedures were performed.     Attention was directed to the right lower eyelid ectropion. An 8 mm lateral canthal incision was made with a 15 blade and dissection carried down to the orbicularis with high temperature cautery. Lateral canthotomy and inferior cantholysis was performed with the cautery. A lateral tarsal strip was fashioned with the high temperature cautery and the Ngoc scissors. The tarsal strip was secured to the lateral orbital rim periosteum with a double-armed 5-0 Vicryl suture in a horizontal mattress fashion. Lateral canthal angle was closed with a gray line to gray line suture of 5-0 Vicryl tied internally. Skin was closed with interrupted 6-0 plain gut sutures. The same was performed on the left side.     Attention was then directed to the right upper lid. Skin was incised following marked lines, and the skin flap was excised with a high temperature cautery. Hemostasis was obtained. Nasally, the orbicularis muscle and septum were opened a small amount of the nasal and central fat pad was excised with the cautery. A 4-0 silk suture was placed through the eyelid margin, and the eyelid everted over a Desmarres retractor. A 6-0 silk suture was threaded 4 mm from the superior tarsal border through the conjunctiva, Ledbetter's muscle, and levator aponeurosis. This suture was then used to elevate the conjunctiva, Ledbetter's muscle, an levator aponeurosis, which was then clamped with a Putterman clamp. A 6-0  plain gut suture was run in a horizontal mattress fashion from lateral to medial and then medial to lateral in a double mattress fashion. The clamped tissues were excised with a #15 blade. This effectively advanced the upper eyelid retractors. The sutures were externalized through the upper eyelid blepharoplasty incision. The upper eyelid skin was then closed with running 6-0 plain gut suture after the lid was reverted to its normal position and the 4-0 silk stay suture removed.     Attention was directed to the left upper eyelid, where the same procedures were performed except the 6-0 silk was threaded 4.25 mm superior to the superior edge of the tarsal plate. Erythromycin ophthalmic ointment was applied to all incisions and into the eyes. The patient tolerated the procedure well. Kathya Breaux  left the operating room in stable condition.     Musa Bee MD

## 2023-09-18 NOTE — ANESTHESIA CARE TRANSFER NOTE
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    Anesthesia Type:   General     Note:    Oropharynx: oropharynx clear of all foreign objects  Level of Consciousness: drowsy  Oxygen Supplementation: nasal cannula  Level of Supplemental Oxygen (L/min / FiO2): 4  Independent Airway: airway patency satisfactory and stable  Dentition: dentition unchanged  Vital Signs Stable: post-procedure vital signs reviewed and stable  Report to RN Given: handoff report given  Patient transferred to: PACU    Handoff Report: Identifed the Patient, Identified the Reponsible Provider, Reviewed the pertinent medical history, Discussed the surgical course, Reviewed Intra-OP anesthesia mangement and issues during anesthesia, Set expectations for post-procedure period and Allowed opportunity for questions and acknowledgement of understanding    Vitals:  Vitals Value Taken Time   BP     Temp     Pulse     Resp     SpO2         Electronically Signed By: JULIETH Gallego CRNA  September 18, 2023  10:03 AM

## 2023-09-18 NOTE — DISCHARGE INSTRUCTIONS
Post-operative Instructions  Ophthalmic Plastic and Reconstructive Surgery    Musa Bee M.D.     All instructions apply to the operated eye(s) or eyelid(s).    Wound care and personal care  ? Apply ice compresses and gentle pressure 15 minutes on, 15 minutes off, for 2 days. If you are sleeping, you don't need to wake up to ice. As long as there is no further bleeding, after two days, switch to warm water compresses for five minutes, four times a day until seen by your physician.   ? You may shower or wash your hair the day after surgery. Do not go swimming for at least 2 weeks to prevent contamination of your wounds.  ? You may go for walks and light activity is ok, but no heavy (over 15 pounds) lifting, bending or excessive straining for one week.   ? Do not apply make-up to the eyes or eyelids for 2 weeks after surgery.  ? Expect some swelling, bruising, black eye (even into the lower eyelids and cheeks). Also expect serum caking, crusting and discharge from the eye and/or incisions. A small amount of surface bleeding, and depending on the type of surgery, bleeding from the inside of the eyelid, is normal for the first 48 hours.  ? Avoid straining, bending at the waist, or lifting more than 15 pounds for 1 week. Sleeping with your head elevated, such as in a recliner, for the first several days can help swelling resolve more quickly.   ? Do continue to ambulate (walk) as you normally would - being sedentary after surgery can cause blood clots.   ? Your eye(s) and eyelid(s) may be painful and tender. This is normal after surgery.      Contact information and follow-up  ? Return to the Eye Clinic for a follow-up appointment with your physician as scheduled. If no appointment has been scheduled:   - [phone] for an appointment with Dr. Bee within 2 to 3 weeks from your date of surgery.     ? For severe pain, bleeding, or loss of vision, call the [facility] at [phone]  See encounter from 8/11/2021.   [phone].    After hours or on weekends and holidays, call [phone] and ask to speak with the ophthalmologist on call.    An on call person can be reached after hours for concerns. The on call doctor should not call in medication refill requests after hours or on weekends, so please plan accordingly. An effort has been made to provide adequate pain medications following every surgery, and refills will not be provided in most instances.     Medications  ? Restart all regular home medications and eye drops. If you take Plavix or Aspirin on a regular basis, wait for 72 hours after your surgery before restarting these in order to decrease the risk of bleeding complications.  ? Avoid aspirin and aspirin-like medications (Motrin, Aleve, Ibuprofen, Zuri-Drewsey etc) for 72 hours to reduce the risk of bleeding. You may take Tylenol (acetaminophen) for pain. Next dose due at 1:30 PM.  Do NOT exceed 4,000 mg in 24 hours.  ? In addition to your home medications, take the following post-operative medications as prescribed by your physician.    ? Apply antibiotic ointment to all sutures three times a day, and into the operated eye(s) at night.   Once you run out, you can apply Vaseline or Aquaphor (over the counter) to the incisions. Don't put the Vaseline or Aquaphor into your eyes.   ? Instill prescribed eye drops 3 times a day for 10 days.   ? If you have ocular irritation, you can use over the counter artificial tears such as Refresh, Systane, or Blink. Do not use Visine, Clear Eyes, or any other drop that gets the red out.   ? In many cases, postoperative discomfort can be managed with Tylenol alone. If narcotic pain medication was prescribed, take pain pills at prescribed frequency as needed for pain.    [facility]   Adult Discharge Orders & Instructions     For 24 hours after surgery    Get plenty of rest.  A responsible adult must stay with you for at least 24 hours after you leave the hospital.   Do  not drive or use heavy equipment.  If you have weakness or tingling, don't drive or use heavy equipment until this feeling goes away.  Do not drink alcohol.  Avoid strenuous or risky activities.  Ask for help when climbing stairs.   You may feel lightheaded.  IF so, sit for a few minutes before standing.  Have someone help you get up.   If you have nausea (feel sick to your stomach): Drink only clear liquids such as apple juice, ginger ale, broth or 7-Up.  Rest may also help.  Be sure to drink enough fluids.  Move to a regular diet as you feel able.  You may have a slight fever. Call the doctor if your fever is over 100 F (37.7 C) (taken under the tongue) or lasts longer than 24 hours.  You may have a dry mouth, a sore throat, muscle aches or trouble sleeping.  These should go away after 24 hours.  Do not make important or legal decisions.     Call your doctor for any of the followin.  Signs of infection (fever, growing tenderness at the surgery site, a large amount of drainage or bleeding, severe pain, foul-smelling drainage, redness, swelling).    2. It has been over 8 to 10 hours since surgery and you are still not able to urinate (pass water).    3.  Headache for over 24 hours.

## 2023-09-18 NOTE — ANESTHESIA PROCEDURE NOTES
Airway       Patient location during procedure: OR       Procedure Start/Stop Times: 9/18/2023 8:43 AM  Staff -        Performed By: CRNAIndications and Patient Condition       Indications for airway management: kajal-procedural       Induction type:intravenous       Mask difficulty assessment: 1 - vent by mask    Final Airway Details       Final airway type: supraglottic airway    Supraglottic Airway Details        Type: LMA       LMA size: 4    Post intubation assessment        Placement verified by: capnometry and chest rise        Number of attempts at approach: 1       Secured with: plastic tape       Ease of procedure: easy       Dentition: Unchanged (Pt desired denture and partial left in place for duration of proceedure.  LMA placed without difficulty.)    Medication(s) Administered   Medication Administration Time: 9/18/2023 8:43 AM

## 2023-09-21 DIAGNOSIS — H02.403 ACQUIRED INVOLUTIONAL PTOSIS OF EYELID, BILATERAL: ICD-10-CM

## 2023-09-21 DIAGNOSIS — H02.831 DERMATOCHALASIS OF BOTH UPPER EYELIDS: ICD-10-CM

## 2023-09-21 DIAGNOSIS — H02.413 MECHANICAL PTOSIS OF EYELID OF BOTH EYES: ICD-10-CM

## 2023-09-21 DIAGNOSIS — H02.834 DERMATOCHALASIS OF BOTH UPPER EYELIDS: ICD-10-CM

## 2023-09-22 RX ORDER — ERYTHROMYCIN 5 MG/G
OINTMENT OPHTHALMIC
Qty: 3.5 G | Refills: 2 | Status: SHIPPED | OUTPATIENT
Start: 2023-09-22 | End: 2023-10-04

## 2023-09-22 NOTE — TELEPHONE ENCOUNTER
erythromycin (ROMYCIN) 5 MG/GM ophthalmic ointment 3.5 g 0 9/18/2023     Last Office Visit : 5-  PROCEDURE:  9-  Future Office visit:  10-4-2023    Kathleen M Doege RN

## 2023-10-04 ENCOUNTER — OFFICE VISIT (OUTPATIENT)
Dept: OPHTHALMOLOGY | Facility: CLINIC | Age: 78
End: 2023-10-04
Payer: COMMERCIAL

## 2023-10-04 DIAGNOSIS — H02.403 ACQUIRED INVOLUTIONAL PTOSIS OF EYELID, BILATERAL: ICD-10-CM

## 2023-10-04 DIAGNOSIS — H02.834 DERMATOCHALASIS OF BOTH UPPER EYELIDS: Primary | ICD-10-CM

## 2023-10-04 DIAGNOSIS — H02.831 DERMATOCHALASIS OF BOTH UPPER EYELIDS: Primary | ICD-10-CM

## 2023-10-04 PROCEDURE — 99024 POSTOP FOLLOW-UP VISIT: CPT | Performed by: OPHTHALMOLOGY

## 2023-10-04 ASSESSMENT — TONOMETRY
OS_IOP_MMHG: 08
IOP_METHOD: ICARE
OD_IOP_MMHG: 11

## 2023-10-04 ASSESSMENT — VISUAL ACUITY
OD_SC+: -2
METHOD: SNELLEN - LINEAR
OS_SC+: +2
OD_SC: 20/40
OS_SC: 20/40

## 2023-10-04 NOTE — PROGRESS NOTES
"Chief Complaint(s) and History of Present Illness(es)     Post Op (Ophthalmology) Both Eyes            Laterality: both eyes          Comments    S/P 1. Bilateral upper eyelid blepharoplasty.   2. Bilateral upper eyelid ptosis repair posterior advancement of upper   eyelid retractors 8 mm right eye and 8.5 mm left eye.   3. Bilateral lower eyelid ectropion repair.   4. Bilateral lower eyelid cosmetic blepharoplasty, 09/18/2023.     Patient notes each day keeps getting better, healing seems to be going   well.  Completed course of EES maci.  Using glaucoma drops and Refresh drops.              Doing well. Happy with outcome.    Patient Instructions   - Apply warm compresses for 1 minute two to three times a day until your bruising has resolved. You can continue this for one more month.   - Cool compresses can help with swelling and itching, you can alternate cool compresses with warm compresses if you find it helpful.  - Apply Aquaphor or Vaseline to the incision at bedtime until it is smooth.    - If you have symptoms of eye irritation, it is good to use over the counter artificial tears. Good brands include Refresh, Blink, and Systane. Do NOT get drops that are for \"red eyes.\"   - It is normal for the incision to appear raised, red, itch and have small lumps. You can gently massage any small bumps along the incision line. These can take up to six months to resolve.    Return in about 2 months (around 12/4/2023).      Attending Physician Attestation: Complete documentation of historical and exam elements from today's encounter can be found in the full encounter summary report (not reduplicated in this progress note). I personally obtained the chief complaint(s) and history of present illness. I confirmed and edited as necessary the review of systems, past medical/surgical history, family history, social history, and examination findings as documented by others; and I examined the patient myself. I personally reviewed " the relevant tests, images, and reports as documented above. I formulated and edited as necessary the assessment and plan and discussed the findings and management plan with the patient and family. I personally reviewed the ophthalmic test(s) associated with this encounter, agree with the interpretation(s) as documented by the resident/fellow, and have edited the corresponding report(s) as necessary. Musa Bee MD

## 2023-10-04 NOTE — PATIENT INSTRUCTIONS
"- Apply warm compresses for 1 minute two to three times a day until your bruising has resolved. You can continue this for one more month.   - Cool compresses can help with swelling and itching, you can alternate cool compresses with warm compresses if you find it helpful.  - Apply Aquaphor or Vaseline to the incision at bedtime until it is smooth.    - If you have symptoms of eye irritation, it is good to use over the counter artificial tears. Good brands include Refresh, Blink, and Systane. Do NOT get drops that are for \"red eyes.\"   - It is normal for the incision to appear raised, red, itch and have small lumps. You can gently massage any small bumps along the incision line. These can take up to six months to resolve.    "

## 2023-10-04 NOTE — NURSING NOTE
Chief Complaints and History of Present Illnesses   Patient presents with    Post Op (Ophthalmology) Both Eyes       Chief Complaint(s) and History of Present Illness(es)       Post Op (Ophthalmology) Both Eyes              Laterality: both eyes              Comments    S/P 1. Bilateral upper eyelid blepharoplasty.   2. Bilateral upper eyelid ptosis repair posterior advancement of upper eyelid retractors 8 mm right eye and 8.5 mm left eye.   3. Bilateral lower eyelid ectropion repair.   4. Bilateral lower eyelid cosmetic blepharoplasty, 09/18/2023.     Patient notes each day keeps getting better, healing seems to be going well.  Completed course of EES maci.  Using glaucoma drops and Refresh drops.                        Leonel Tran, Ophthalmic Assistant

## 2023-11-06 ENCOUNTER — TRANSFERRED RECORDS (OUTPATIENT)
Dept: HEALTH INFORMATION MANAGEMENT | Facility: CLINIC | Age: 78
End: 2023-11-06
Payer: COMMERCIAL

## 2023-11-06 LAB
ALT SERPL-CCNC: 9 IU/L (ref 0–32)
AST SERPL-CCNC: 18 IU/L (ref 0–40)

## 2023-11-15 ENCOUNTER — APPOINTMENT (OUTPATIENT)
Dept: URBAN - METROPOLITAN AREA CLINIC 254 | Age: 78
Setting detail: DERMATOLOGY
End: 2023-11-16

## 2023-11-15 VITALS — WEIGHT: 122 LBS | HEIGHT: 63 IN

## 2023-11-15 DIAGNOSIS — D22 MELANOCYTIC NEVI: ICD-10-CM

## 2023-11-15 DIAGNOSIS — L82.1 OTHER SEBORRHEIC KERATOSIS: ICD-10-CM

## 2023-11-15 DIAGNOSIS — L81.4 OTHER MELANIN HYPERPIGMENTATION: ICD-10-CM

## 2023-11-15 DIAGNOSIS — Z71.89 OTHER SPECIFIED COUNSELING: ICD-10-CM

## 2023-11-15 DIAGNOSIS — D18.0 HEMANGIOMA: ICD-10-CM

## 2023-11-15 DIAGNOSIS — L57.8 OTHER SKIN CHANGES DUE TO CHRONIC EXPOSURE TO NONIONIZING RADIATION: ICD-10-CM

## 2023-11-15 PROBLEM — D18.01 HEMANGIOMA OF SKIN AND SUBCUTANEOUS TISSUE: Status: ACTIVE | Noted: 2023-11-15

## 2023-11-15 PROBLEM — D22.5 MELANOCYTIC NEVI OF TRUNK: Status: ACTIVE | Noted: 2023-11-15

## 2023-11-15 PROCEDURE — OTHER COUNSELING: OTHER

## 2023-11-15 PROCEDURE — 99213 OFFICE O/P EST LOW 20 MIN: CPT

## 2023-11-15 PROCEDURE — OTHER MIPS QUALITY: OTHER

## 2023-11-15 ASSESSMENT — LOCATION DETAILED DESCRIPTION DERM
LOCATION DETAILED: LEFT ANTERIOR DISTAL THIGH
LOCATION DETAILED: LEFT MEDIAL UPPER BACK
LOCATION DETAILED: LEFT SUPERIOR MEDIAL UPPER BACK
LOCATION DETAILED: RIGHT SUPERIOR UPPER BACK
LOCATION DETAILED: LEFT POSTERIOR SHOULDER
LOCATION DETAILED: RIGHT THENAR EMINENCE
LOCATION DETAILED: RIGHT RADIAL DORSAL HAND
LOCATION DETAILED: LEFT PROXIMAL PRETIBIAL REGION

## 2023-11-15 ASSESSMENT — LOCATION ZONE DERM
LOCATION ZONE: LEG
LOCATION ZONE: HAND
LOCATION ZONE: ARM
LOCATION ZONE: TRUNK

## 2023-11-15 ASSESSMENT — LOCATION SIMPLE DESCRIPTION DERM
LOCATION SIMPLE: LEFT UPPER BACK
LOCATION SIMPLE: LEFT THIGH
LOCATION SIMPLE: LEFT PRETIBIAL REGION
LOCATION SIMPLE: LEFT SHOULDER
LOCATION SIMPLE: RIGHT HAND
LOCATION SIMPLE: RIGHT UPPER BACK

## 2023-11-29 ENCOUNTER — OFFICE VISIT (OUTPATIENT)
Dept: OPHTHALMOLOGY | Facility: CLINIC | Age: 78
End: 2023-11-29
Payer: COMMERCIAL

## 2023-11-29 DIAGNOSIS — H02.403 ACQUIRED INVOLUTIONAL PTOSIS OF EYELID, BILATERAL: ICD-10-CM

## 2023-11-29 DIAGNOSIS — H02.834 DERMATOCHALASIS OF BOTH UPPER EYELIDS: Primary | ICD-10-CM

## 2023-11-29 DIAGNOSIS — H02.831 DERMATOCHALASIS OF BOTH UPPER EYELIDS: Primary | ICD-10-CM

## 2023-11-29 PROCEDURE — 99024 POSTOP FOLLOW-UP VISIT: CPT | Mod: GC | Performed by: OPHTHALMOLOGY

## 2023-11-29 ASSESSMENT — VISUAL ACUITY
OS_SC+: -1
OD_SC+: +3
OD_SC: 20/40
OS_SC: 20/40
METHOD: SNELLEN - LINEAR

## 2023-11-29 ASSESSMENT — TONOMETRY
IOP_METHOD: ICARE
OS_IOP_MMHG: 10
OD_IOP_MMHG: 09

## 2023-11-29 NOTE — PROGRESS NOTES
Chief Complaint(s) and History of Present Illness(es)     Post Op (Ophthalmology) Both Eyes            Laterality: both eyes         Comments    S/P 1. Bilateral upper eyelid blepharoplasty.   2. Bilateral upper eyelid ptosis repair posterior advancement of upper   eyelid retractors 8 mm right eye and 8.5 mm left eye.   3. Bilateral lower eyelid ectropion repair.   4. Bilateral lower eyelid cosmetic blepharoplasty  09/18/23.  Patient notes continued improvement  in healing. Happy with results.      Subjective:  Felling well. Really likes result  Still has some swelling,right more than left, but getting better.   Feels like has not stiches or lumps or bumps  No eye discharge, no blurring vision     Plan:  - Follow-up up PRN     Doing well. Happy with outcome.  Ilia Khan. Plastic Surgery Resident    Looks great and very pleased with improvement in peripheral vision and aesthetically very pleased as well.    Plan follow up as needed.     Plans to see Dr. Roque early next year      Attending Physician Attestation: Complete documentation of historical and exam elements from today's encounter can be found in the full encounter summary report (not reduplicated in this progress note). I personally obtained the chief complaint(s) and history of present illness. I confirmed and edited as necessary the review of systems, past medical/surgical history, family history, social history, and examination findings as documented by others; and I examined the patient myself. I personally reviewed the relevant tests, images, and reports as documented above. I formulated and edited as necessary the assessment and plan and discussed the findings and management plan with the patient and family. I personally reviewed the ophthalmic test(s) associated with this encounter, agree with the interpretation(s) as documented by the resident/fellow, and have edited the corresponding report(s) as necessary. Musa Bee MD

## 2023-11-29 NOTE — NURSING NOTE
Chief Complaints and History of Present Illnesses   Patient presents with    Post Op (Ophthalmology) Both Eyes       Chief Complaint(s) and History of Present Illness(es)       Post Op (Ophthalmology) Both Eyes              Laterality: both eyes              Comments    S/P 1. Bilateral upper eyelid blepharoplasty.   2. Bilateral upper eyelid ptosis repair posterior advancement of upper eyelid retractors 8 mm right eye and 8.5 mm left eye.   3. Bilateral lower eyelid ectropion repair.   4. Bilateral lower eyelid cosmetic blepharoplasty  09/18/23.  Patient notes continued improvement  in healing. Happy with results.                    Leonel Tran, Ophthalmic Assistant

## 2023-12-20 ENCOUNTER — TELEPHONE (OUTPATIENT)
Dept: AUDIOLOGY | Facility: CLINIC | Age: 78
End: 2023-12-20
Payer: COMMERCIAL

## 2023-12-20 NOTE — TELEPHONE ENCOUNTER
----- Message from Shira Aden MA sent at 12/20/2023  1:46 PM CST -----  Regarding: Hearing aid questions  Please call patient regarding hearing aids.  She requested to speak to audiologist.    [phone].

## 2023-12-20 NOTE — TELEPHONE ENCOUNTER
Attempted to return patient's phone call. No answer, phone continued to ring. Attempted other numbers, but with no answer. Patient should contact with any concerns     Lara Kamara, CCC-A  Licensed Audiologist  [place] #[id number]

## 2023-12-21 ENCOUNTER — TELEPHONE (OUTPATIENT)
Dept: AUDIOLOGY | Facility: CLINIC | Age: 78
End: 2023-12-21

## 2023-12-21 DIAGNOSIS — H90.3 SENSORINEURAL HEARING LOSS, BILATERAL: Primary | ICD-10-CM

## 2023-12-21 PROCEDURE — V5267 HEARING AID SUP/ACCESS/DEV: HCPCS

## 2023-12-21 NOTE — TELEPHONE ENCOUNTER
Requested supplies mailed to patient address on file.     CHARGES: [id number] (supplies, $5/wax trap pack). $20 total    Lara Kamara, CCC-A  Licensed Audiologist  [place] #[id number]      12/21/23

## 2024-01-02 DIAGNOSIS — H40.1131 PRIMARY OPEN ANGLE GLAUCOMA (POAG) OF BOTH EYES, MILD STAGE: ICD-10-CM

## 2024-01-02 RX ORDER — LATANOPROST 50 UG/ML
1 SOLUTION/ DROPS OPHTHALMIC EVERY EVENING
Qty: 7.5 ML | Refills: 4 | Status: SHIPPED | OUTPATIENT
Start: 2024-01-02

## 2024-01-17 NOTE — PROGRESS NOTES
History of Present Illness - Kathya Breaux is a 78 year old female last seen on 5/15/2023, for procedural removal of severe cerumen impaction.  She is here for 6 month follow up and ear check.    She reports that her ears have progressively felt stuffy again, especially over the last month,  Otherwise, no ear pain, no blood or purulence from the ears.  She denies any vertigo or headache, but the hearing is stuffy.    Past Medical History -   Patient Active Problem List   Diagnosis    Personal history of DVT (deep vein thrombosis)    Factor V Leiden Heterozygote    CARDIOVASCULAR SCREENING; LDL GOAL LESS THAN 130    Long-term (current) use of anticoagulants [Z79.01]    Posterior vitreous detachment, bilateral    Hx of LASIK    Crohn's disease of both small and large intestine with other complication (H)    Age-related osteoporosis without current pathological fracture    Combined forms of age-related cataract, mild, of both eyes    Primary open angle glaucoma (POAG) of both eyes    Dermatochalasis of both upper eyelids    Acquired involutional ptosis of eyelid, bilateral    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       Current Medications -   Current Outpatient Medications:     azaTHIOprine (IMURAN) 50 MG tablet, Take 1 tablet by mouth daily, Disp: , Rfl:     CALCIUM 500 + D 500-200 MG-IU OR TABS, 1 TABLET DAILY, Disp: , Rfl: 0    latanoprost (XALATAN) 0.005 % ophthalmic solution, Place 1 drop into both eyes every evening, Disp: 7.5 mL, Rfl: 4    MULTI-VITAMIN OR TABS, 1 TABLET DAILY, Disp: , Rfl: 0    OMEGA 3-6-9 FATTY ACIDS OR, 1 TABLET DAILY, Disp: , Rfl:     omeprazole (PRILOSEC) 20 MG DR capsule, Take 1 capsule (20 mg) by mouth daily, Disp: 90 capsule, Rfl: 3    rivaroxaban ANTICOAGULANT (XARELTO ANTICOAGULANT) 20 MG TABS tablet, Take 1 tablet (20 mg) by mouth daily (with dinner), Disp: 90 tablet, Rfl: 3    timolol maleate (TIMOPTIC) 0.5 % ophthalmic solution, Place 1 drop into both eyes every morning Wait  at least 5 minutes between drops if using more than one at a time., Disp: 15 mL, Rfl: 4    VITAMIN B-12 1000 MCG/ML IJ SOLN, 1000 MCG Monthly, Disp: , Rfl: 0    VITAMIN-B COMPLEX OR TABS, 1 tablet daily, Disp: , Rfl:     Allergies -   Allergies   Allergen Reactions    Penicillins Hives     Throat closed       Social History -   Social History     Social History    Marital status:      Spouse name: N/A    Number of children: N/A    Years of education: N/A     Social History Main Topics    Smoking status: Former Smoker     Packs/day: 0.50     Years: 38.00     Quit date: 3/1/2008    Smokeless tobacco: Never Used      Comment: 6 cigs per day    Alcohol use No    Drug use: No    Sexual activity: Yes     Partners: Male     Other Topics Concern    Not on file     Social History Narrative    Caffeine intake/servings daily - 2-3    Calcium intake/servings daily - 1-2+ plus 1000mg calcium supp    Exercise 0 times weekly - describe    Sunscreen used - Yes    Seatbelts used - Yes    Guns stored in the home - No    Self Breast Exam - Yes    Pap test up to date -  Yes     Eye exam up to date -  Yes    Dental exam up to date -  Yes    DEXA scan up to date -  2005    Flex Sig/Colonoscopy up to date -  Yes 2006    Mammography up to date -  Yes 4/2006    Immunizations reviewed and up to date - Yes 1998    Abuse: Current or Past (Physical, Sexual or Emotional) - No    Do you feel safe in your environment - Yes    Do you cope well with stress - Yes    Do you suffer from insomnia - No    Last updated by: Char Strauss 3/8/2007       Family History -   Family History   Problem Relation Age of Onset    Circulatory Mother         phelbities    Genetic Disorder Mother         factor V leiden    Heart Disease Father         dysrythmia's    Glaucoma Father     Unknown/Adopted Maternal Grandmother     Gynecology Maternal Grandfather     Cerebrovascular Disease Paternal Grandmother     Cerebrovascular Disease Paternal Grandfather      Alcohol/Drug Brother         alcohol and drugs    Anxiety Disorder Brother     Substance Abuse Brother     Alcohol/Drug Sister         alcohol and drugs    Anxiety Disorder Sister     Substance Abuse Sister     Heart Disease Son         clogged arteries    Cardiovascular Son         sudden heart attack    Breast Cancer Cousin     Colon Cancer Other     Macular Degeneration No family hx of        Review of Systems - As per HPI and PMHx, otherwise 10+ system review of the head and neck, and general constitution is negative.    Physical Exam  There were no vitals taken for this visit.    General - The patient is well nourished and well developed, and appears to have good nutritional status.  Alert and oriented to person and place, answers questions and cooperates with examination appropriately.   Head and Face - Normocephalic and atraumatic, with no gross asymmetry noted of the contour of the facial features.  The facial nerve is intact, with strong symmetric movements.  Voice and Breathing - The patient was breathing comfortably without the use of accessory muscles. There was no wheezing, stridor, or stertor.  The patients voice was clear and strong, and had appropriate pitch and quality.  Eyes - Extraocular movements intact, and the pupils were reactive to light.  Sclera were not icteric or injected, conjunctiva were pink and moist.    Cerumen Removal    Physical Exam and Procedure  Ears - On examination of the ears, I found that the BOTH ears were completely impacted with dense cerumen and her hearing aid ear piece tips.  Therefore, I positioned them in the examination chair in a semi-supine position, beginning with the right side.  I used the binocular surgical microscope to perform cerumen removal.  I began by using a cerumen loop to gently lift the edges of the cerumen mass away from the walls of the external canal.  Once I did this, I was able to suction away fragments of wax and debris using suction.  Once the  mass was loose enough, the entire plug was pulled from the canal.  The tympanic membrane was intact, no sign of perforation or middle ear effusion.    I turned my attention to the contralateral side once again using the binocular surgical microscope to perform cerumen removal and ear piece tip.  I began by using a cerumen loop to gently lift the edges of the cerumen mass away from the walls of the external canal.  Once I did this, I was able to suction away fragments of wax and debris using suction.  Once the mass was loose enough, the entire plug was pulled from the canal.  The tympanic membrane was intact, no sign of perforation or middle ear effusion.      A/P - Kathya Breaux is a 78 year old female  (H61.23) Bilateral impacted cerumen  (primary encounter diagnosis)  (H93.8X3) Sensation of fullness in both ears    The patient has had their cerumen procedurally removed today.  I have discussed ear care at home, including avoiding qtips or any other object placed in the canal.  I have also discussed that over the counter cerumen kits like Debrox or Cerumenex can be useful.    If no other issues, follow up with me in 3-6 months for an ear check.

## 2024-01-22 ENCOUNTER — TRANSFERRED RECORDS (OUTPATIENT)
Dept: HEALTH INFORMATION MANAGEMENT | Facility: CLINIC | Age: 79
End: 2024-01-22
Payer: COMMERCIAL

## 2024-01-22 LAB
ALT SERPL-CCNC: 12 IU/L (ref 0–32)
AST SERPL-CCNC: 23 IU/L (ref 0–40)

## 2024-01-25 ENCOUNTER — OFFICE VISIT (OUTPATIENT)
Dept: OTOLARYNGOLOGY | Facility: CLINIC | Age: 79
End: 2024-01-25
Payer: COMMERCIAL

## 2024-01-25 VITALS — SYSTOLIC BLOOD PRESSURE: 128 MMHG | OXYGEN SATURATION: 98 % | DIASTOLIC BLOOD PRESSURE: 72 MMHG | HEART RATE: 71 BPM

## 2024-01-25 DIAGNOSIS — H61.23 BILATERAL IMPACTED CERUMEN: ICD-10-CM

## 2024-01-25 DIAGNOSIS — H90.3 SENSORINEURAL HEARING LOSS (SNHL) OF BOTH EARS: Primary | ICD-10-CM

## 2024-01-25 PROCEDURE — 69210 REMOVE IMPACTED EAR WAX UNI: CPT | Performed by: OTOLARYNGOLOGY

## 2024-01-25 NOTE — LETTER
1/25/2024         RE: Kathya Breaux  [address]  [place] [address]        Dear Colleague,    Thank you for referring your patient, Kathya Breaux, to the [facility]. Please see a copy of my visit note below.    History of Present Illness - Kathya Breaux is a 78 year old female last seen on 5/15/2023, for procedural removal of severe cerumen impaction.  She is here for 6 month follow up and ear check.    She reports that her ears have progressively felt stuffy again, especially over the last month,  Otherwise, no ear pain, no blood or purulence from the ears.  She denies any vertigo or headache, but the hearing is stuffy.    Past Medical History -   Patient Active Problem List   Diagnosis     Personal history of DVT (deep vein thrombosis)     Factor V Leiden Heterozygote     CARDIOVASCULAR SCREENING; LDL GOAL LESS THAN 130     Long-term (current) use of anticoagulants [Z79.01]     Posterior vitreous detachment, bilateral     Hx of LASIK     Crohn's disease of both small and large intestine with other complication (H)     Age-related osteoporosis without current pathological fracture     Combined forms of age-related cataract, mild, of both eyes     Primary open angle glaucoma (POAG) of both eyes     Dermatochalasis of both upper eyelids     Acquired involutional ptosis of eyelid, bilateral     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       Current Medications -   Current Outpatient Medications:      azaTHIOprine (IMURAN) 50 MG tablet, Take 1 tablet by mouth daily, Disp: , Rfl:      CALCIUM 500 + D 500-200 MG-IU OR TABS, 1 TABLET DAILY, Disp: , Rfl: 0     latanoprost (XALATAN) 0.005 % ophthalmic solution, Place 1 drop into both eyes every evening, Disp: 7.5 mL, Rfl: 4     MULTI-VITAMIN OR TABS, 1 TABLET DAILY, Disp: , Rfl: 0     OMEGA 3-6-9 FATTY ACIDS OR, 1 TABLET DAILY, Disp: , Rfl:      omeprazole (PRILOSEC) 20 MG DR capsule, Take 1 capsule (20 mg) by mouth  daily, Disp: 90 capsule, Rfl: 3     rivaroxaban ANTICOAGULANT (XARELTO ANTICOAGULANT) 20 MG TABS tablet, Take 1 tablet (20 mg) by mouth daily (with dinner), Disp: 90 tablet, Rfl: 3     timolol maleate (TIMOPTIC) 0.5 % ophthalmic solution, Place 1 drop into both eyes every morning Wait at least 5 minutes between drops if using more than one at a time., Disp: 15 mL, Rfl: 4     VITAMIN B-12 1000 MCG/ML IJ SOLN, 1000 MCG Monthly, Disp: , Rfl: 0     VITAMIN-B COMPLEX OR TABS, 1 tablet daily, Disp: , Rfl:     Allergies -   Allergies   Allergen Reactions     Penicillins Hives     Throat closed       Social History -   Social History     Social History     Marital status:      Spouse name: N/A     Number of children: N/A     Years of education: N/A     Social History Main Topics     Smoking status: Former Smoker     Packs/day: 0.50     Years: 38.00     Quit date: 3/1/2008     Smokeless tobacco: Never Used      Comment: 6 cigs per day     Alcohol use No     Drug use: No     Sexual activity: Yes     Partners: Male     Other Topics Concern     Not on file     Social History Narrative    Caffeine intake/servings daily - 2-3    Calcium intake/servings daily - 1-2+ plus 1000mg calcium supp    Exercise 0 times weekly - describe    Sunscreen used - Yes    Seatbelts used - Yes    Guns stored in the home - No    Self Breast Exam - Yes    Pap test up to date -  Yes     Eye exam up to date -  Yes    Dental exam up to date -  Yes    DEXA scan up to date -  2005    Flex Sig/Colonoscopy up to date -  Yes 2006    Mammography up to date -  Yes 4/2006    Immunizations reviewed and up to date - Yes 1998    Abuse: Current or Past (Physical, Sexual or Emotional) - No    Do you feel safe in your environment - Yes    Do you cope well with stress - Yes    Do you suffer from insomnia - No    Last updated by: Char Strauss 3/8/2007       Family History -   Family History   Problem Relation Age of Onset     Circulatory Mother         phelbities      Genetic Disorder Mother         factor V leiden     Heart Disease Father         dysrythmia's     Glaucoma Father      Unknown/Adopted Maternal Grandmother      Gynecology Maternal Grandfather      Cerebrovascular Disease Paternal Grandmother      Cerebrovascular Disease Paternal Grandfather      Alcohol/Drug Brother         alcohol and drugs     Anxiety Disorder Brother      Substance Abuse Brother      Alcohol/Drug Sister         alcohol and drugs     Anxiety Disorder Sister      Substance Abuse Sister      Heart Disease Son         clogged arteries     Cardiovascular Son         sudden heart attack     Breast Cancer Cousin      Colon Cancer Other      Macular Degeneration No family hx of        Review of Systems - As per HPI and PMHx, otherwise 10+ system review of the head and neck, and general constitution is negative.    Physical Exam  There were no vitals taken for this visit.    General - The patient is well nourished and well developed, and appears to have good nutritional status.  Alert and oriented to person and place, answers questions and cooperates with examination appropriately.   Head and Face - Normocephalic and atraumatic, with no gross asymmetry noted of the contour of the facial features.  The facial nerve is intact, with strong symmetric movements.  Voice and Breathing - The patient was breathing comfortably without the use of accessory muscles. There was no wheezing, stridor, or stertor.  The patients voice was clear and strong, and had appropriate pitch and quality.  Eyes - Extraocular movements intact, and the pupils were reactive to light.  Sclera were not icteric or injected, conjunctiva were pink and moist.    Cerumen Removal    Physical Exam and Procedure  Ears - On examination of the ears, I found that the BOTH ears were completely impacted with dense cerumen and her hearing aid ear piece tips.  Therefore, I positioned them in the examination chair in a semi-supine position,  beginning with the right side.  I used the binocular surgical microscope to perform cerumen removal.  I began by using a cerumen loop to gently lift the edges of the cerumen mass away from the walls of the external canal.  Once I did this, I was able to suction away fragments of wax and debris using suction.  Once the mass was loose enough, the entire plug was pulled from the canal.  The tympanic membrane was intact, no sign of perforation or middle ear effusion.    I turned my attention to the contralateral side once again using the binocular surgical microscope to perform cerumen removal and ear piece tip.  I began by using a cerumen loop to gently lift the edges of the cerumen mass away from the walls of the external canal.  Once I did this, I was able to suction away fragments of wax and debris using suction.  Once the mass was loose enough, the entire plug was pulled from the canal.  The tympanic membrane was intact, no sign of perforation or middle ear effusion.      A/P - Kathya Breaux is a 78 year old female  (H61.23) Bilateral impacted cerumen  (primary encounter diagnosis)  (H93.8X3) Sensation of fullness in both ears    The patient has had their cerumen procedurally removed today.  I have discussed ear care at home, including avoiding qtips or any other object placed in the canal.  I have also discussed that over the counter cerumen kits like Debrox or Cerumenex can be useful.    If no other issues, follow up with me in 3-6 months for an ear check.        Again, thank you for allowing me to participate in the care of your patient.        Sincerely,        Oscar Moore MD

## 2024-03-18 ENCOUNTER — OFFICE VISIT (OUTPATIENT)
Dept: OPHTHALMOLOGY | Facility: CLINIC | Age: 79
End: 2024-03-18
Payer: COMMERCIAL

## 2024-03-18 DIAGNOSIS — Z98.890 HISTORY OF PTOSIS REPAIR: ICD-10-CM

## 2024-03-18 DIAGNOSIS — Z98.890 HISTORY OF ECTROPION REPAIR: ICD-10-CM

## 2024-03-18 DIAGNOSIS — H40.1131 PRIMARY OPEN ANGLE GLAUCOMA (POAG) OF BOTH EYES, MILD STAGE: Primary | ICD-10-CM

## 2024-03-18 DIAGNOSIS — Z01.01 ENCOUNTER FOR EXAMINATION OF EYES AND VISION WITH ABNORMAL FINDINGS: ICD-10-CM

## 2024-03-18 DIAGNOSIS — H43.813 POSTERIOR VITREOUS DETACHMENT, BILATERAL: ICD-10-CM

## 2024-03-18 DIAGNOSIS — Z98.890 HX OF LASIK: ICD-10-CM

## 2024-03-18 DIAGNOSIS — H52.4 PRESBYOPIA: ICD-10-CM

## 2024-03-18 DIAGNOSIS — H25.813 COMBINED FORMS OF AGE-RELATED CATARACT OF BOTH EYES: ICD-10-CM

## 2024-03-18 PROCEDURE — 92015 DETERMINE REFRACTIVE STATE: CPT | Performed by: OPHTHALMOLOGY

## 2024-03-18 PROCEDURE — 92014 COMPRE OPH EXAM EST PT 1/>: CPT | Performed by: OPHTHALMOLOGY

## 2024-03-18 ASSESSMENT — TONOMETRY
IOP_METHOD: APPLANATION
OS_IOP_MMHG: 15
OD_IOP_MMHG: 16

## 2024-03-18 ASSESSMENT — SLIT LAMP EXAM - LIDS: COMMENTS: GOOD CREASE

## 2024-03-18 ASSESSMENT — CONF VISUAL FIELD
OS_NORMAL: 1
OD_INFERIOR_NASAL_RESTRICTION: 0
OD_SUPERIOR_TEMPORAL_RESTRICTION: 0
OS_SUPERIOR_TEMPORAL_RESTRICTION: 0
OD_INFERIOR_TEMPORAL_RESTRICTION: 0
OD_NORMAL: 1
OD_SUPERIOR_NASAL_RESTRICTION: 0
OS_INFERIOR_NASAL_RESTRICTION: 0
OS_INFERIOR_TEMPORAL_RESTRICTION: 0
OS_SUPERIOR_NASAL_RESTRICTION: 0

## 2024-03-18 ASSESSMENT — REFRACTION_MANIFEST
OS_ADD: +2.75
OD_CYLINDER: +2.00
OD_SPHERE: -0.75
OD_AXIS: 014
OS_CYLINDER: +1.25
OS_AXIS: 155
OS_SPHERE: PLANO
OD_ADD: +2.75

## 2024-03-18 ASSESSMENT — CUP TO DISC RATIO
OS_RATIO: 0.6
OD_RATIO: 0.6

## 2024-03-18 ASSESSMENT — VISUAL ACUITY
OD_SC: 20/40
METHOD: SNELLEN - LINEAR
OD_SC+: -2
OS_SC+: -1
OS_SC: 20/50

## 2024-03-18 NOTE — PATIENT INSTRUCTIONS
"Continue:   Latanoprost (green top) every evening both eyes.  Timolol (yellow top) every morning both eyes.      May use artificial tears up to four times a day (like Refresh Optive, Systane Balance, or TheraTears. Avoid \"get the red out\" drops and generic artifical tears).     Wait at least 5 minutes between drops if using more than one at a time.     Glasses prescription given - optional    Return visit 3 months for an intraocular pressure check, glaucoma OCT, retinal OCT, and Horta Visual Field.     Maxx Roque M.D.  [phone]    "

## 2024-03-18 NOTE — PROGRESS NOTES
" Current Eye Medications:    Latanoprost (green top) every evening both eyes.  Timolol (yellow top) every morning both eyes.     Subjective: Here for complete eye exam. S/p upper and lower blepharoplasty with Dr. Bee 09/2023. Vision is stable at distance and near in both eyes. Patient is wondering if all stitching has dissolved from surgery because left upper eye lid feels heavy and looks to be lower. No eye pain or discomfort. Using glaucoma drops as directed.    History of high myopia prior to LASIK.      Objective:  See Ophthalmology Exam.       Assessment:  Intraocular pressure up both eyes today.  Excellent result from recent lid surgery.  Otherwise stable eye exam.      ICD-10-CM    1. Primary open angle glaucoma (POAG) of both eyes, mild stage  H40.1131       2. Combined forms of age-related cataract, mild, of both eyes  H25.813       3. Hx of LASIK  Z98.890       4. History of ptosis repair  Z98.890       5. History of ectropion repair  Z98.890       6. Posterior vitreous detachment, bilateral  H43.813       7. Encounter for examination of eyes and vision with abnormal findings  Z01.01       8. Presbyopia  H52.4            Plan:  Continue:   Latanoprost (green top) every evening both eyes.  Timolol (yellow top) every morning both eyes.      May use artificial tears up to four times a day (like Refresh Optive, Systane Balance, or TheraTears. Avoid \"get the red out\" drops and generic artifical tears).     Wait at least 5 minutes between drops if using more than one at a time.     Glasses prescription given - optional    Return visit 3 months for an intraocular pressure check, glaucoma OCT, retinal OCT, and Horta Visual Field.     Maxx Roque M.D.  [phone]       "

## 2024-03-18 NOTE — LETTER
"    3/18/2024         RE: Kathya Breaux  [address] [address]        Dear Colleague,    Thank you for referring your patient, Kathya Breaux, to the [facility]. Please see a copy of my visit note below.     Current Eye Medications:    Latanoprost (green top) every evening both eyes.  Timolol (yellow top) every morning both eyes.     Subjective: Here for complete eye exam. S/p upper and lower blepharoplasty with Dr. Bee 09/2023. Vision is stable at distance and near in both eyes. Patient is wondering if all stitching has dissolved from surgery because left upper eye lid feels heavy and looks to be lower. No eye pain or discomfort. Using glaucoma drops as directed.    History of high myopia prior to LASIK.      Objective:  See Ophthalmology Exam.       Assessment:  Intraocular pressure up both eyes today.  Excellent result from recent lid surgery.  Otherwise stable eye exam.      ICD-10-CM    1. Primary open angle glaucoma (POAG) of both eyes, mild stage  H40.1131       2. Combined forms of age-related cataract, mild, of both eyes  H25.813       3. Hx of LASIK  Z98.890       4. History of ptosis repair  Z98.890       5. History of ectropion repair  Z98.890       6. Posterior vitreous detachment, bilateral  H43.813       7. Encounter for examination of eyes and vision with abnormal findings  Z01.01       8. Presbyopia  H52.4            Plan:  Continue:   Latanoprost (green top) every evening both eyes.  Timolol (yellow top) every morning both eyes.      May use artificial tears up to four times a day (like Refresh Optive, Systane Balance, or TheraTears. Avoid \"get the red out\" drops and generic artifical tears).     Wait at least 5 minutes between drops if using more than one at a time.     Glasses prescription given - optional    Return visit 3 months for an intraocular pressure check, glaucoma OCT, retinal OCT, and Horta Visual Field.     Maxx STUBBS" BRIDGETT Roque  [phone]         Again, thank you for allowing me to participate in the care of your patient.        Sincerely,        Maxx Roque MD

## 2024-03-23 PROBLEM — Z98.890 HISTORY OF ECTROPION REPAIR: Status: ACTIVE | Noted: 2024-03-23

## 2024-03-23 PROBLEM — Z98.890 HISTORY OF PTOSIS REPAIR: Status: ACTIVE | Noted: 2024-03-23

## 2024-04-03 ENCOUNTER — ANCILLARY PROCEDURE (OUTPATIENT)
Dept: GENERAL RADIOLOGY | Facility: CLINIC | Age: 79
End: 2024-04-03
Attending: NURSE PRACTITIONER
Payer: COMMERCIAL

## 2024-04-03 ENCOUNTER — OFFICE VISIT (OUTPATIENT)
Dept: FAMILY MEDICINE | Facility: CLINIC | Age: 79
End: 2024-04-03
Payer: COMMERCIAL

## 2024-04-03 VITALS
SYSTOLIC BLOOD PRESSURE: 122 MMHG | TEMPERATURE: 97.7 F | OXYGEN SATURATION: 98 % | RESPIRATION RATE: 18 BRPM | HEART RATE: 92 BPM | DIASTOLIC BLOOD PRESSURE: 72 MMHG | WEIGHT: 118.5 LBS | BODY MASS INDEX: 20.23 KG/M2 | HEIGHT: 64 IN

## 2024-04-03 DIAGNOSIS — S91.302A UNSPECIFIED OPEN WOUND, LEFT FOOT, INITIAL ENCOUNTER: ICD-10-CM

## 2024-04-03 DIAGNOSIS — Z23 NEED FOR TDAP VACCINATION: ICD-10-CM

## 2024-04-03 DIAGNOSIS — Z23 NEED FOR SHINGLES VACCINE: ICD-10-CM

## 2024-04-03 DIAGNOSIS — Z29.11 NEED FOR VACCINATION AGAINST RESPIRATORY SYNCYTIAL VIRUS: ICD-10-CM

## 2024-04-03 DIAGNOSIS — L03.116 CELLULITIS OF LEFT FOOT: ICD-10-CM

## 2024-04-03 DIAGNOSIS — L03.116 CELLULITIS OF LEFT FOOT: Primary | ICD-10-CM

## 2024-04-03 PROCEDURE — 99213 OFFICE O/P EST LOW 20 MIN: CPT | Performed by: NURSE PRACTITIONER

## 2024-04-03 PROCEDURE — 73630 X-RAY EXAM OF FOOT: CPT | Mod: TC | Performed by: RADIOLOGY

## 2024-04-03 RX ORDER — RESPIRATORY SYNCYTIAL VIRUS VACCINE 120MCG/0.5
0.5 KIT INTRAMUSCULAR ONCE
Qty: 1 EACH | Refills: 0 | Status: CANCELLED | OUTPATIENT
Start: 2024-04-03 | End: 2024-04-03

## 2024-04-03 RX ORDER — MUPIROCIN 20 MG/G
OINTMENT TOPICAL
Qty: 22 G | Refills: 1 | Status: SHIPPED | OUTPATIENT
Start: 2024-04-03 | End: 2024-08-16

## 2024-04-03 RX ORDER — CEPHALEXIN 500 MG/1
500 CAPSULE ORAL 4 TIMES DAILY
Qty: 28 CAPSULE | Refills: 0 | Status: SHIPPED | OUTPATIENT
Start: 2024-04-03 | End: 2024-04-10

## 2024-04-03 RX ORDER — SYRINGE WITH NEEDLE, 1 ML 25GX5/8"
SYRINGE, EMPTY DISPOSABLE MISCELLANEOUS
COMMUNITY
Start: 2023-11-06

## 2024-04-03 ASSESSMENT — PAIN SCALES - GENERAL: PAINLEVEL: MODERATE PAIN (5)

## 2024-04-03 NOTE — PROGRESS NOTES
Assessment & Plan       Need for Tdap vaccination  Encouraged to go to pharmacy to have tetanus vaccine updated.    Cellulitis of left foot  Educated on care of area.  Educated on use of antibiotic.  To follow-up in 1 week if no improvement.  Information given regarding signs of allergic reaction and when would need to stop antibiotic and take Benadryl.  - cephALEXin (KEFLEX) 500 MG capsule; Take 1 capsule (500 mg) by mouth 4 times daily for 7 days  - mupirocin (BACTROBAN) 2 % external ointment; Apply twice per day to scab on left foot until area is resolved.  - XR Foot Left G/E 3 Views; Future    Unspecified open wound, left foot, initial encounter  Educated on care of area.  Educated on use of antibiotic.  To follow-up in 1 week if no improvement.  Information given regarding signs of allergic reaction and when would need to stop antibiotic and take Benadryl.  - cephALEXin (KEFLEX) 500 MG capsule; Take 1 capsule (500 mg) by mouth 4 times daily for 7 days  - mupirocin (BACTROBAN) 2 % external ointment; Apply twice per day to scab on left foot until area is resolved.  - XR Foot Left G/E 3 Views; Future    Review of the result(s) of each unique test - Labs  Prescription drug management    Carol Rasheed is a 78 year old, presenting for the following health issues:  Ankle/Foot left (Left foot pain red and swelling)      4/3/2024     7:03 AM   Additional Questions   Roomed by ciro         4/3/2024     7:03 AM   Patient Reported Additional Medications   Patient reports taking the following new medications none     Via the Health Maintenance questionnaire, the patient has reported the following services have been completed -Mammogram, this information has been sent to the abstraction team.  History of Present Illness       Reason for visit:  Foot injury  Symptom onset:  3-4 weeks ago  Symptoms include:  Red and swelling and scab  Symptom intensity:  Moderate  Symptom progression:  Worsening  Had these symptoms  before:  No  Prior treatment description:  Neosporin    She eats 2-3 servings of fruits and vegetables daily.She consumes 0 sweetened beverage(s) daily.She exercises with enough effort to increase her heart rate 10 to 19 minutes per day.  She exercises with enough effort to increase her heart rate 4 days per week.   She is taking medications regularly.         Went in for pedicure and was doing pumice stone to side of left foot. Then foot started to bleed and they put something on it to make it stop bleeding.  Went home and started putting FAY on it. This occurred about 7 weeks ago.  Now left side of foot is red, warm and scab is not healing.  No fever or chills. Hurts to touch it or wear shoes. Just sitting does not hurt. If walks does not really hurt. Overall feels okay.  No drainage from area.      Objective    /72 (BP Location: Left arm, Patient Position: Sitting, Cuff Size: Adult Regular)   Pulse 92   Temp 97.7  F (36.5  C) (Tympanic)   Wt 53.8 kg (118 lb 8 oz)   BMI 20.66 kg/m    Body mass index is 20.66 kg/m .  Physical Exam  Constitutional:       Appearance: She is well-developed.   Cardiovascular:      Rate and Rhythm: Normal rate and regular rhythm.   Pulmonary:      Effort: Pulmonary effort is normal.      Breath sounds: Normal breath sounds.   Musculoskeletal:        Feet:    Skin:     General: Skin is warm.   Neurological:      Mental Status: She is alert.   Psychiatric:         Mood and Affect: Mood normal.             Signed Electronically by: JULIETH Rinaldi CNP

## 2024-04-03 NOTE — PATIENT INSTRUCTIONS
Apply antibiotic ointment to scab twice per day until is completely healed.  Keep covered with Band-Aid.  May cleanse with soap and warm water.    Please get your tetanus vaccine updated when you are at the pharmacy.    I have sent a prescription to the [facility] pharmacy in [place] for Keflex.  You should take one of them 4 times a day for the next 7 days.    Please follow-up if this area does not improve over the next 7 days.

## 2024-04-05 NOTE — TELEPHONE ENCOUNTER
[facility] Call Center    Phone Message    May a detailed message be left on voicemail: yes     Reason for Call: Other: Pt calling to order supples for HA's.      mini fit pro wax, 4 packs  mini fit bass 6 mm double vent 4 packs    Please advise. Thanks.      Action Taken: Other: AUDIO    Travel Screening: Not Applicable                                                                    X Size Of Lesion In Cm (Optional): 0 Detail Level: Detailed

## 2024-04-16 NOTE — PROGRESS NOTES
"History of Present Illness - Kathya Breaux is a 78 year old female last seen on 1/25/2024, for procedural removal of severe cerumen impaction.  She is here for 6 month follow up and ear check.    She reports that her ears have progressively felt stuffy again, especially over the last month,  Otherwise, no ear pain, no blood or purulence from the ears.  She denies any vertigo or headache, but the hearing is stuffy.    Past Medical History -   Patient Active Problem List   Diagnosis    Personal history of DVT (deep vein thrombosis)    Factor V Leiden Heterozygote    Long-term (current) use of anticoagulants [Z79.01]    Posterior vitreous detachment, bilateral    Hx of LASIK    Crohn's disease of both small and large intestine with other complication (H)    Age-related osteoporosis without current pathological fracture    Combined forms of age-related cataract, mild, of both eyes    Primary open angle glaucoma (POAG) of both eyes    Dermatochalasis of both upper eyelids    Acquired involutional ptosis of eyelid, bilateral    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    History of ptosis repair    History of ectropion repair       Current Medications -   Current Outpatient Medications:     azaTHIOprine (IMURAN) 50 MG tablet, Take 1 tablet by mouth daily, Disp: , Rfl:     B-D LUER-ROBERT SYRINGE 25G X 1\" 3 ML MISC, USE MONTHLY, Disp: , Rfl:     CALCIUM 500 + D 500-200 MG-IU OR TABS, 1 TABLET DAILY, Disp: , Rfl: 0    latanoprost (XALATAN) 0.005 % ophthalmic solution, Place 1 drop into both eyes every evening, Disp: 7.5 mL, Rfl: 4    MULTI-VITAMIN OR TABS, 1 TABLET DAILY, Disp: , Rfl: 0    mupirocin (BACTROBAN) 2 % external ointment, Apply twice per day to scab on left foot until area is resolved., Disp: 22 g, Rfl: 1    OMEGA 3-6-9 FATTY ACIDS OR, 1 TABLET DAILY, Disp: , Rfl:     omeprazole (PRILOSEC) 20 MG DR capsule, Take 1 capsule (20 mg) by mouth daily, Disp: 90 capsule, Rfl: 3    rivaroxaban ANTICOAGULANT (XARELTO " ANTICOAGULANT) 20 MG TABS tablet, Take 1 tablet (20 mg) by mouth daily (with dinner), Disp: 90 tablet, Rfl: 3    timolol maleate (TIMOPTIC) 0.5 % ophthalmic solution, Place 1 drop into both eyes every morning Wait at least 5 minutes between drops if using more than one at a time., Disp: 15 mL, Rfl: 4    VITAMIN B-12 1000 MCG/ML IJ SOLN, 1000 MCG Monthly, Disp: , Rfl: 0    VITAMIN-B COMPLEX OR TABS, 1 tablet daily, Disp: , Rfl:     Allergies -   Allergies   Allergen Reactions    Penicillins Hives     Throat closed       Social History -   Social History     Social History    Marital status:      Spouse name: N/A    Number of children: N/A    Years of education: N/A     Social History Main Topics    Smoking status: Former Smoker     Packs/day: 0.50     Years: 38.00     Quit date: 3/1/2008    Smokeless tobacco: Never Used      Comment: 6 cigs per day    Alcohol use No    Drug use: No    Sexual activity: Yes     Partners: Male     Other Topics Concern    Not on file     Social History Narrative    Caffeine intake/servings daily - 2-3    Calcium intake/servings daily - 1-2+ plus 1000mg calcium supp    Exercise 0 times weekly - describe    Sunscreen used - Yes    Seatbelts used - Yes    Guns stored in the home - No    Self Breast Exam - Yes    Pap test up to date -  Yes     Eye exam up to date -  Yes    Dental exam up to date -  Yes    DEXA scan up to date -  2005    Flex Sig/Colonoscopy up to date -  Yes 2006    Mammography up to date -  Yes 4/2006    Immunizations reviewed and up to date - Yes 1998    Abuse: Current or Past (Physical, Sexual or Emotional) - No    Do you feel safe in your environment - Yes    Do you cope well with stress - Yes    Do you suffer from insomnia - No    Last updated by: Char Strauss 3/8/2007       Family History -   Family History   Problem Relation Age of Onset    Circulatory Mother         phelbities    Genetic Disorder Mother         factor V leiden    Heart Disease Father          dysrythmia's    Glaucoma Father     Unknown/Adopted Maternal Grandmother     Gynecology Maternal Grandfather     Cerebrovascular Disease Paternal Grandmother     Cerebrovascular Disease Paternal Grandfather     Alcohol/Drug Brother         alcohol and drugs    Anxiety Disorder Brother     Substance Abuse Brother     Alcohol/Drug Sister         alcohol and drugs    Anxiety Disorder Sister     Substance Abuse Sister     Heart Disease Son         clogged arteries    Cardiovascular Son         sudden heart attack    Breast Cancer Cousin     Colon Cancer Other     Macular Degeneration No family hx of        Review of Systems - As per HPI and PMHx, otherwise 10+ system review of the head and neck, and general constitution is negative.    Physical Exam  There were no vitals taken for this visit.    General - The patient is well nourished and well developed, and appears to have good nutritional status.  Alert and oriented to person and place, answers questions and cooperates with examination appropriately.   Head and Face - Normocephalic and atraumatic, with no gross asymmetry noted of the contour of the facial features.  The facial nerve is intact, with strong symmetric movements.  Voice and Breathing - The patient was breathing comfortably without the use of accessory muscles. There was no wheezing, stridor, or stertor.  The patients voice was clear and strong, and had appropriate pitch and quality.  Eyes - Extraocular movements intact, and the pupils were reactive to light.  Sclera were not icteric or injected, conjunctiva were pink and moist.    Cerumen Removal    Physical Exam and Procedure  Ears - On examination of the ears, I found that the BOTH ears were completely impacted with dense cerumen and her hearing aid ear piece tips.  Therefore, I positioned them in the examination chair in a semi-supine position, beginning with the right side.  I used the binocular surgical microscope to perform cerumen removal.  I began  by using a cerumen loop to gently lift the edges of the cerumen mass away from the walls of the external canal.  Once I did this, I was able to suction away fragments of wax and debris using suction.  Once the mass was loose enough, the entire plug was pulled from the canal.  The tympanic membrane was intact, no sign of perforation or middle ear effusion.    I turned my attention to the contralateral side once again using the binocular surgical microscope to perform cerumen removal and ear piece tip.  I began by using a cerumen loop to gently lift the edges of the cerumen mass away from the walls of the external canal.  Once I did this, I was able to suction away fragments of wax and debris using suction.  Once the mass was loose enough, the entire plug was pulled from the canal.  The tympanic membrane was intact, no sign of perforation or middle ear effusion.      A/P - Kathya Breaux is a 78 year old female  (H61.23) Bilateral impacted cerumen  (primary encounter diagnosis)  (H93.8X3) Sensation of fullness in both ears    The patient has had their cerumen procedurally removed today.  I have discussed ear care at home, including avoiding qtips or any other object placed in the canal.  I have also discussed that over the counter cerumen kits like Debrox or Cerumenex can be useful.    If no other issues, follow up with me in 3-6 months for an ear check.

## 2024-04-22 ENCOUNTER — OFFICE VISIT (OUTPATIENT)
Dept: OTOLARYNGOLOGY | Facility: CLINIC | Age: 79
End: 2024-04-22
Payer: COMMERCIAL

## 2024-04-22 VITALS
WEIGHT: 118 LBS | BODY MASS INDEX: 20.57 KG/M2 | HEART RATE: 74 BPM | DIASTOLIC BLOOD PRESSURE: 61 MMHG | OXYGEN SATURATION: 99 % | RESPIRATION RATE: 16 BRPM | SYSTOLIC BLOOD PRESSURE: 106 MMHG

## 2024-04-22 DIAGNOSIS — H90.3 SENSORINEURAL HEARING LOSS (SNHL) OF BOTH EARS: ICD-10-CM

## 2024-04-22 DIAGNOSIS — H61.23 BILATERAL IMPACTED CERUMEN: Primary | ICD-10-CM

## 2024-04-22 PROCEDURE — 69210 REMOVE IMPACTED EAR WAX UNI: CPT | Performed by: OTOLARYNGOLOGY

## 2024-04-22 ASSESSMENT — PAIN SCALES - GENERAL: PAINLEVEL: NO PAIN (0)

## 2024-04-22 NOTE — LETTER
"    4/22/2024         RE: Kathya Breaux  [address] [address]        Dear Colleague,    Thank you for referring your patient, Kathya Breaux, to the [facility]. Please see a copy of my visit note below.    History of Present Illness - Kathya Breaux is a 78 year old female last seen on 1/25/2024, for procedural removal of severe cerumen impaction.  She is here for 6 month follow up and ear check.    She reports that her ears have progressively felt stuffy again, especially over the last month,  Otherwise, no ear pain, no blood or purulence from the ears.  She denies any vertigo or headache, but the hearing is stuffy.    Past Medical History -   Patient Active Problem List   Diagnosis     Personal history of DVT (deep vein thrombosis)     Factor V Leiden Heterozygote     Long-term (current) use of anticoagulants [Z79.01]     Posterior vitreous detachment, bilateral     Hx of LASIK     Crohn's disease of both small and large intestine with other complication (H)     Age-related osteoporosis without current pathological fracture     Combined forms of age-related cataract, mild, of both eyes     Primary open angle glaucoma (POAG) of both eyes     Dermatochalasis of both upper eyelids     Acquired involutional ptosis of eyelid, bilateral     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     History of ptosis repair     History of ectropion repair       Current Medications -   Current Outpatient Medications:      azaTHIOprine (IMURAN) 50 MG tablet, Take 1 tablet by mouth daily, Disp: , Rfl:      B-D LUER-ROBERT SYRINGE 25G X 1\" 3 ML MISC, USE MONTHLY, Disp: , Rfl:      CALCIUM 500 + D 500-200 MG-IU OR TABS, 1 TABLET DAILY, Disp: , Rfl: 0     latanoprost (XALATAN) 0.005 % ophthalmic solution, Place 1 drop into both eyes every evening, Disp: 7.5 mL, Rfl: 4     MULTI-VITAMIN OR TABS, 1 TABLET DAILY, Disp: , Rfl: 0     mupirocin (BACTROBAN) 2 % external ointment, Apply twice " per day to scab on left foot until area is resolved., Disp: 22 g, Rfl: 1     OMEGA 3-6-9 FATTY ACIDS OR, 1 TABLET DAILY, Disp: , Rfl:      omeprazole (PRILOSEC) 20 MG DR capsule, Take 1 capsule (20 mg) by mouth daily, Disp: 90 capsule, Rfl: 3     rivaroxaban ANTICOAGULANT (XARELTO ANTICOAGULANT) 20 MG TABS tablet, Take 1 tablet (20 mg) by mouth daily (with dinner), Disp: 90 tablet, Rfl: 3     timolol maleate (TIMOPTIC) 0.5 % ophthalmic solution, Place 1 drop into both eyes every morning Wait at least 5 minutes between drops if using more than one at a time., Disp: 15 mL, Rfl: 4     VITAMIN B-12 1000 MCG/ML IJ SOLN, 1000 MCG Monthly, Disp: , Rfl: 0     VITAMIN-B COMPLEX OR TABS, 1 tablet daily, Disp: , Rfl:     Allergies -   Allergies   Allergen Reactions     Penicillins Hives     Throat closed       Social History -   Social History     Social History     Marital status:      Spouse name: N/A     Number of children: N/A     Years of education: N/A     Social History Main Topics     Smoking status: Former Smoker     Packs/day: 0.50     Years: 38.00     Quit date: 3/1/2008     Smokeless tobacco: Never Used      Comment: 6 cigs per day     Alcohol use No     Drug use: No     Sexual activity: Yes     Partners: Male     Other Topics Concern     Not on file     Social History Narrative    Caffeine intake/servings daily - 2-3    Calcium intake/servings daily - 1-2+ plus 1000mg calcium supp    Exercise 0 times weekly - describe    Sunscreen used - Yes    Seatbelts used - Yes    Guns stored in the home - No    Self Breast Exam - Yes    Pap test up to date -  Yes     Eye exam up to date -  Yes    Dental exam up to date -  Yes    DEXA scan up to date -  2005    Flex Sig/Colonoscopy up to date -  Yes 2006    Mammography up to date -  Yes 4/2006    Immunizations reviewed and up to date - Yes 1998    Abuse: Current or Past (Physical, Sexual or Emotional) - No    Do you feel safe in your environment - Yes    Do you cope  well with stress - Yes    Do you suffer from insomnia - No    Last updated by: Char Strauss 3/8/2007       Family History -   Family History   Problem Relation Age of Onset     Circulatory Mother         phelbities     Genetic Disorder Mother         factor V leiden     Heart Disease Father         dysrythmia's     Glaucoma Father      Unknown/Adopted Maternal Grandmother      Gynecology Maternal Grandfather      Cerebrovascular Disease Paternal Grandmother      Cerebrovascular Disease Paternal Grandfather      Alcohol/Drug Brother         alcohol and drugs     Anxiety Disorder Brother      Substance Abuse Brother      Alcohol/Drug Sister         alcohol and drugs     Anxiety Disorder Sister      Substance Abuse Sister      Heart Disease Son         clogged arteries     Cardiovascular Son         sudden heart attack     Breast Cancer Cousin      Colon Cancer Other      Macular Degeneration No family hx of        Review of Systems - As per HPI and PMHx, otherwise 10+ system review of the head and neck, and general constitution is negative.    Physical Exam  There were no vitals taken for this visit.    General - The patient is well nourished and well developed, and appears to have good nutritional status.  Alert and oriented to person and place, answers questions and cooperates with examination appropriately.   Head and Face - Normocephalic and atraumatic, with no gross asymmetry noted of the contour of the facial features.  The facial nerve is intact, with strong symmetric movements.  Voice and Breathing - The patient was breathing comfortably without the use of accessory muscles. There was no wheezing, stridor, or stertor.  The patients voice was clear and strong, and had appropriate pitch and quality.  Eyes - Extraocular movements intact, and the pupils were reactive to light.  Sclera were not icteric or injected, conjunctiva were pink and moist.    Cerumen Removal    Physical Exam and Procedure  Ears - On  examination of the ears, I found that the BOTH ears were completely impacted with dense cerumen and her hearing aid ear piece tips.  Therefore, I positioned them in the examination chair in a semi-supine position, beginning with the right side.  I used the binocular surgical microscope to perform cerumen removal.  I began by using a cerumen loop to gently lift the edges of the cerumen mass away from the walls of the external canal.  Once I did this, I was able to suction away fragments of wax and debris using suction.  Once the mass was loose enough, the entire plug was pulled from the canal.  The tympanic membrane was intact, no sign of perforation or middle ear effusion.    I turned my attention to the contralateral side once again using the binocular surgical microscope to perform cerumen removal and ear piece tip.  I began by using a cerumen loop to gently lift the edges of the cerumen mass away from the walls of the external canal.  Once I did this, I was able to suction away fragments of wax and debris using suction.  Once the mass was loose enough, the entire plug was pulled from the canal.  The tympanic membrane was intact, no sign of perforation or middle ear effusion.      A/P - Kathya Breaux is a 78 year old female  (H61.23) Bilateral impacted cerumen  (primary encounter diagnosis)  (H93.8X3) Sensation of fullness in both ears    The patient has had their cerumen procedurally removed today.  I have discussed ear care at home, including avoiding qtips or any other object placed in the canal.  I have also discussed that over the counter cerumen kits like Debrox or Cerumenex can be useful.    If no other issues, follow up with me in 3-6 months for an ear check.        Again, thank you for allowing me to participate in the care of your patient.        Sincerely,        Oscar Moore MD

## 2024-04-24 ENCOUNTER — TRANSFERRED RECORDS (OUTPATIENT)
Dept: HEALTH INFORMATION MANAGEMENT | Facility: CLINIC | Age: 79
End: 2024-04-24
Payer: COMMERCIAL

## 2024-04-24 LAB
ALT SERPL-CCNC: 13 IU/L (ref 0–32)
AST SERPL-CCNC: 21 IU/L (ref 0–40)

## 2024-05-25 ENCOUNTER — HEALTH MAINTENANCE LETTER (OUTPATIENT)
Age: 79
End: 2024-05-25

## 2024-05-30 DIAGNOSIS — H40.1131 PRIMARY OPEN ANGLE GLAUCOMA (POAG) OF BOTH EYES, MILD STAGE: ICD-10-CM

## 2024-05-30 RX ORDER — TIMOLOL MALEATE 5 MG/ML
1 SOLUTION/ DROPS OPHTHALMIC EVERY MORNING
Qty: 15 ML | Refills: 4 | Status: SHIPPED | OUTPATIENT
Start: 2024-05-30

## 2024-06-05 ENCOUNTER — ALLIED HEALTH/NURSE VISIT (OUTPATIENT)
Dept: AUDIOLOGY | Facility: CLINIC | Age: 79
End: 2024-06-05
Payer: COMMERCIAL

## 2024-06-05 DIAGNOSIS — H90.3 SENSORINEURAL HEARING LOSS, BILATERAL: Primary | ICD-10-CM

## 2024-06-05 PROCEDURE — V5299 HEARING SERVICE: HCPCS | Performed by: AUDIOLOGIST

## 2024-06-05 NOTE — PROGRESS NOTES
HEARING AID RECHECK    Patient Name:  Kathya Breaux    Patient Age:   78 year old    :  1945    Background:   Patient dropped off hearing aid with complaint of not working    SIDE: Left    : [place]    TYPE: OPN S2    S/N: [id number]     WARRANTY:     Procedures:   Hearing aid is not working based on listening check, no output.   is bad and needs replacing.  I called patient and told her the news and she authorized a replacement  at cost of $100.00.  (#1 85 dB)    Plan:   I have ordered the .  When it comes in,  will be replaced, then call patient for pickup.    NO CHARGE VISIT    George Leo MA, CCC-A  [place] Licensed Audiologist #[id number]  2024

## 2024-06-07 ENCOUNTER — TELEPHONE (OUTPATIENT)
Dept: AUDIOLOGY | Facility: CLINIC | Age: 79
End: 2024-06-07

## 2024-06-07 ENCOUNTER — ANCILLARY PROCEDURE (OUTPATIENT)
Dept: MAMMOGRAPHY | Facility: CLINIC | Age: 79
End: 2024-06-07
Attending: INTERNAL MEDICINE
Payer: COMMERCIAL

## 2024-06-07 DIAGNOSIS — H90.3 SENSORINEURAL HEARING LOSS, BILATERAL: Primary | ICD-10-CM

## 2024-06-07 DIAGNOSIS — Z12.31 VISIT FOR SCREENING MAMMOGRAM: ICD-10-CM

## 2024-06-07 PROCEDURE — 77063 BREAST TOMOSYNTHESIS BI: CPT | Mod: TC | Performed by: STUDENT IN AN ORGANIZED HEALTH CARE EDUCATION/TRAINING PROGRAM

## 2024-06-07 PROCEDURE — V5014 HEARING AID REPAIR/MODIFYING: HCPCS | Performed by: AUDIOLOGIST

## 2024-06-07 PROCEDURE — 77067 SCR MAMMO BI INCL CAD: CPT | Mod: TC | Performed by: STUDENT IN AN ORGANIZED HEALTH CARE EDUCATION/TRAINING PROGRAM

## 2024-06-07 NOTE — TELEPHONE ENCOUNTER
Left message for patient to  repaired left hearing aid.   replaced and aid is working well.    Archie Evans.   Doctor of Audiology  License #[id number]

## 2024-06-16 DIAGNOSIS — D68.51 FACTOR V LEIDEN MUTATION (H): ICD-10-CM

## 2024-06-16 DIAGNOSIS — Z86.718 PERSONAL HISTORY OF DVT (DEEP VEIN THROMBOSIS): ICD-10-CM

## 2024-06-17 ENCOUNTER — OFFICE VISIT (OUTPATIENT)
Dept: OPHTHALMOLOGY | Facility: CLINIC | Age: 79
End: 2024-06-17
Payer: COMMERCIAL

## 2024-06-17 DIAGNOSIS — H40.1132 PRIMARY OPEN ANGLE GLAUCOMA (POAG) OF BOTH EYES, MODERATE STAGE: Primary | ICD-10-CM

## 2024-06-17 PROCEDURE — 92083 EXTENDED VISUAL FIELD XM: CPT | Performed by: OPHTHALMOLOGY

## 2024-06-17 PROCEDURE — 92012 INTRM OPH EXAM EST PATIENT: CPT | Performed by: OPHTHALMOLOGY

## 2024-06-17 PROCEDURE — 92133 CPTRZD OPH DX IMG PST SGM ON: CPT | Performed by: OPHTHALMOLOGY

## 2024-06-17 RX ORDER — RIVAROXABAN 20 MG/1
20 TABLET, FILM COATED ORAL
Qty: 100 TABLET | Refills: 2 | OUTPATIENT
Start: 2024-06-17

## 2024-06-17 ASSESSMENT — TONOMETRY
IOP_METHOD: APPLANATION
OS_IOP_MMHG: 16
OD_IOP_MMHG: 15

## 2024-06-17 ASSESSMENT — VISUAL ACUITY
METHOD: SNELLEN - LINEAR
OD_PH_SC: 20/30
OS_SC+: -1
OS_SC: 20/50
OS_PH_SC: 20/40
OD_SC: 20/60-1
OD_SC+: +2

## 2024-06-17 ASSESSMENT — SLIT LAMP EXAM - LIDS: COMMENTS: GOOD CREASE

## 2024-06-17 NOTE — PROGRESS NOTES
" Current Eye Medications:   Latanoprost (green top) every evening both eyes.  Timolol (yellow top) every morning both eyes.    Refresh drops daily      Subjective: Here for glaucoma follow up with testing. Patient complains of eyes stinging for about 10 minutes after using latanoprost drops. Overall vision seems stable. Using drops as directed.      Objective:  See Ophthalmology Exam.       Assessment:  Stable intraocular pressures, glaucoma OCT, and Horta Visual Field both eyes in patient with open angle glaucoma.      Plan:  Continue:   Latanoprost (green top) every evening both eyes.  Timolol (yellow top) every morning both eyes.    May use artificial tears up to four times a day (like Refresh Optive, Systane Balance, or TheraTears. Avoid \"get the red out\" drops and generic artifical tears).     Wait at least 5 minutes between drops if using more than one at a time.    Okay to fill glasses Rx from last visit.     Return visit in 9 months for a complete exam.     Maxx Roque M.D.  [phone]       "

## 2024-06-17 NOTE — PATIENT INSTRUCTIONS
"Continue:   Latanoprost (green top) every evening both eyes.  Timolol (yellow top) every morning both eyes.    May use artificial tears up to four times a day (like Refresh Optive, Systane Balance, or TheraTears. Avoid \"get the red out\" drops and generic artifical tears).     Wait at least 5 minutes between drops if using more than one at a time.     Return visit in 9 months for a complete exam.     Maxx Roque M.D.  [phone]    "

## 2024-06-17 NOTE — LETTER
"6/17/2024      Kathya Breaux  [address]  [facility] [address]      Dear Colleague,    Thank you for referring your patient, Kathya Breaux, to the [facility]. Please see a copy of my visit note below.     Current Eye Medications:   Latanoprost (green top) every evening both eyes.  Timolol (yellow top) every morning both eyes.    Refresh drops daily      Subjective: Here for glaucoma follow up with testing. Patient complains of eyes stinging for about 10 minutes after using latanoprost drops. Overall vision seems stable. Using drops as directed.      Objective:  See Ophthalmology Exam.       Assessment:  Stable intraocular pressures, glaucoma OCT, and Horta Visual Field both eyes in patient with open angle glaucoma.      Plan:  Continue:   Latanoprost (green top) every evening both eyes.  Timolol (yellow top) every morning both eyes.    May use artificial tears up to four times a day (like Refresh Optive, Systane Balance, or TheraTears. Avoid \"get the red out\" drops and generic artifical tears).     Wait at least 5 minutes between drops if using more than one at a time.    Okay to fill glasses Rx from last visit.     Return visit in 9 months for a complete exam.     Maxx Roque M.D.  [phone]         Again, thank you for allowing me to participate in the care of your patient.        Sincerely,        Maxx Roque MD  "

## 2024-06-22 ASSESSMENT — PACHYMETRY
OS_CT(UM): 472
OD_CT(UM): 468

## 2024-07-18 NOTE — PROGRESS NOTES
"History of Present Illness - Kathya Breaux is a 78 year old female last seen on 4/22/2024, for procedural removal of severe cerumen impaction.  She is here for 6 month follow up and ear check.    She reports that her ears have progressively felt stuffy again, especially over the last month,  Otherwise, no ear pain, no blood or purulence from the ears.  She denies any vertigo or headache, but the hearing is stuffy.    Past Medical History -   Patient Active Problem List   Diagnosis    Personal history of DVT (deep vein thrombosis)    Factor V Leiden Heterozygote    Long-term (current) use of anticoagulants [Z79.01]    Posterior vitreous detachment, bilateral    Hx of LASIK    Crohn's disease of both small and large intestine with other complication (H)    Age-related osteoporosis without current pathological fracture    Combined forms of age-related cataract, mild, of both eyes    Primary open angle glaucoma (POAG) of both eyes    Dermatochalasis of both upper eyelids    Acquired involutional ptosis of eyelid, bilateral    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    History of ptosis repair    History of ectropion repair       Current Medications -   Current Outpatient Medications:     azaTHIOprine (IMURAN) 50 MG tablet, Take 1 tablet by mouth daily, Disp: , Rfl:     B-D LUER-ROBERT SYRINGE 25G X 1\" 3 ML MISC, USE MONTHLY, Disp: , Rfl:     CALCIUM 500 + D 500-200 MG-IU OR TABS, 1 TABLET DAILY, Disp: , Rfl: 0    latanoprost (XALATAN) 0.005 % ophthalmic solution, Place 1 drop into both eyes every evening, Disp: 7.5 mL, Rfl: 4    MULTI-VITAMIN OR TABS, 1 TABLET DAILY, Disp: , Rfl: 0    mupirocin (BACTROBAN) 2 % external ointment, Apply twice per day to scab on left foot until area is resolved., Disp: 22 g, Rfl: 1    OMEGA 3-6-9 FATTY ACIDS OR, 1 TABLET DAILY, Disp: , Rfl:     omeprazole (PRILOSEC) 20 MG DR capsule, Take 1 capsule (20 mg) by mouth daily, Disp: 90 capsule, Rfl: 3    rivaroxaban ANTICOAGULANT (XARELTO " ANTICOAGULANT) 20 MG TABS tablet, Take 1 tablet (20 mg) by mouth daily (with dinner), Disp: 90 tablet, Rfl: 3    timolol maleate (TIMOPTIC) 0.5 % ophthalmic solution, Place 1 drop into both eyes every morning Wait at least 5 minutes between drops if using more than one at a time., Disp: 15 mL, Rfl: 4    VITAMIN B-12 1000 MCG/ML IJ SOLN, 1000 MCG Monthly, Disp: , Rfl: 0    VITAMIN-B COMPLEX OR TABS, 1 tablet daily, Disp: , Rfl:     Allergies -   Allergies   Allergen Reactions    Penicillins Hives     Throat closed       Social History -   Social History     Social History    Marital status:      Spouse name: N/A    Number of children: N/A    Years of education: N/A     Social History Main Topics    Smoking status: Former Smoker     Packs/day: 0.50     Years: 38.00     Quit date: 3/1/2008    Smokeless tobacco: Never Used      Comment: 6 cigs per day    Alcohol use No    Drug use: No    Sexual activity: Yes     Partners: Male     Other Topics Concern    Not on file     Social History Narrative    Caffeine intake/servings daily - 2-3    Calcium intake/servings daily - 1-2+ plus 1000mg calcium supp    Exercise 0 times weekly - describe    Sunscreen used - Yes    Seatbelts used - Yes    Guns stored in the home - No    Self Breast Exam - Yes    Pap test up to date -  Yes     Eye exam up to date -  Yes    Dental exam up to date -  Yes    DEXA scan up to date -  2005    Flex Sig/Colonoscopy up to date -  Yes 2006    Mammography up to date -  Yes 4/2006    Immunizations reviewed and up to date - Yes 1998    Abuse: Current or Past (Physical, Sexual or Emotional) - No    Do you feel safe in your environment - Yes    Do you cope well with stress - Yes    Do you suffer from insomnia - No    Last updated by: Char Strauss 3/8/2007       Family History -   Family History   Problem Relation Age of Onset    Circulatory Mother         phelbities    Genetic Disorder Mother         factor V leiden    Heart Disease Father          dysrythmia's    Glaucoma Father     Unknown/Adopted Maternal Grandmother     Gynecology Maternal Grandfather     Cerebrovascular Disease Paternal Grandmother     Cerebrovascular Disease Paternal Grandfather     Alcohol/Drug Brother         alcohol and drugs    Anxiety Disorder Brother     Substance Abuse Brother     Alcohol/Drug Sister         alcohol and drugs    Anxiety Disorder Sister     Substance Abuse Sister     Heart Disease Son         clogged arteries    Cardiovascular Son         sudden heart attack    Breast Cancer Cousin     Colon Cancer Other     Macular Degeneration No family hx of        Review of Systems - As per HPI and PMHx, otherwise 10+ system review of the head and neck, and general constitution is negative.    Physical Exam  There were no vitals taken for this visit.    General - The patient is well nourished and well developed, and appears to have good nutritional status.  Alert and oriented to person and place, answers questions and cooperates with examination appropriately.   Head and Face - Normocephalic and atraumatic, with no gross asymmetry noted of the contour of the facial features.  The facial nerve is intact, with strong symmetric movements.  Voice and Breathing - The patient was breathing comfortably without the use of accessory muscles. There was no wheezing, stridor, or stertor.  The patients voice was clear and strong, and had appropriate pitch and quality.  Eyes - Extraocular movements intact, and the pupils were reactive to light.  Sclera were not icteric or injected, conjunctiva were pink and moist.    Cerumen Removal    Physical Exam and Procedure  Ears - On examination of the ears, I found that the BOTH ears were completely impacted with dense cerumen.  Therefore, I positioned them in the examination chair in a semi-supine position, beginning with the right side.  I used the binocular surgical microscope to perform cerumen removal.  I began by using a cerumen loop to gently  lift the edges of the cerumen mass away from the walls of the external canal.  Once I did this, I was able to suction away fragments of wax and debris using suction.  Once the mass was loose enough, the entire plug was pulled from the canal.  The tympanic membrane was intact, no sign of perforation or middle ear effusion.    I turned my attention to the contralateral side once again using the binocular surgical microscope to perform cerumen removal.  I began by using a cerumen loop to gently lift the edges of the cerumen mass away from the walls of the external canal.  Once I did this, I was able to suction away fragments of wax and debris using suction.  Once the mass was loose enough, the entire plug was pulled from the canal.  The tympanic membrane was intact, no sign of perforation or middle ear effusion.      A/P - Kathya Breaux is a 78 year old female  (H61.23) Bilateral impacted cerumen  (primary encounter diagnosis)  (H93.8X3) Sensation of fullness in both ears    The patient has had their cerumen procedurally removed today.  I have discussed ear care at home, including avoiding qtips or any other object placed in the canal.  I have also discussed that over the counter cerumen kits like Debrox or Cerumenex can be useful.    If no other issues, follow up with me in 3-6 months for an ear check.

## 2024-07-29 ENCOUNTER — OFFICE VISIT (OUTPATIENT)
Dept: OTOLARYNGOLOGY | Facility: CLINIC | Age: 79
End: 2024-07-29
Payer: COMMERCIAL

## 2024-07-29 VITALS — OXYGEN SATURATION: 95 % | SYSTOLIC BLOOD PRESSURE: 130 MMHG | DIASTOLIC BLOOD PRESSURE: 81 MMHG | HEART RATE: 89 BPM

## 2024-07-29 DIAGNOSIS — H90.3 SENSORINEURAL HEARING LOSS (SNHL) OF BOTH EARS: ICD-10-CM

## 2024-07-29 DIAGNOSIS — H61.23 BILATERAL IMPACTED CERUMEN: Primary | ICD-10-CM

## 2024-07-29 PROCEDURE — 69210 REMOVE IMPACTED EAR WAX UNI: CPT | Performed by: OTOLARYNGOLOGY

## 2024-07-29 NOTE — LETTER
"7/29/2024      Kathya Breaux  [address]  [facility] [address]      Dear Colleague,    Thank you for referring your patient, Kathya Braeux, to the [facility]. Please see a copy of my visit note below.    History of Present Illness - Kathya Breaux is a 78 year old female last seen on 4/22/2024, for procedural removal of severe cerumen impaction.  She is here for 6 month follow up and ear check.    She reports that her ears have progressively felt stuffy again, especially over the last month,  Otherwise, no ear pain, no blood or purulence from the ears.  She denies any vertigo or headache, but the hearing is stuffy.    Past Medical History -   Patient Active Problem List   Diagnosis     Personal history of DVT (deep vein thrombosis)     Factor V Leiden Heterozygote     Long-term (current) use of anticoagulants [Z79.01]     Posterior vitreous detachment, bilateral     Hx of LASIK     Crohn's disease of both small and large intestine with other complication (H)     Age-related osteoporosis without current pathological fracture     Combined forms of age-related cataract, mild, of both eyes     Primary open angle glaucoma (POAG) of both eyes     Dermatochalasis of both upper eyelids     Acquired involutional ptosis of eyelid, bilateral     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     History of ptosis repair     History of ectropion repair       Current Medications -   Current Outpatient Medications:      azaTHIOprine (IMURAN) 50 MG tablet, Take 1 tablet by mouth daily, Disp: , Rfl:      B-D LUER-ROBERT SYRINGE 25G X 1\" 3 ML MISC, USE MONTHLY, Disp: , Rfl:      CALCIUM 500 + D 500-200 MG-IU OR TABS, 1 TABLET DAILY, Disp: , Rfl: 0     latanoprost (XALATAN) 0.005 % ophthalmic solution, Place 1 drop into both eyes every evening, Disp: 7.5 mL, Rfl: 4     MULTI-VITAMIN OR TABS, 1 TABLET DAILY, Disp: , Rfl: 0     mupirocin (BACTROBAN) 2 % external ointment, Apply twice per day to " scab on left foot until area is resolved., Disp: 22 g, Rfl: 1     OMEGA 3-6-9 FATTY ACIDS OR, 1 TABLET DAILY, Disp: , Rfl:      omeprazole (PRILOSEC) 20 MG DR capsule, Take 1 capsule (20 mg) by mouth daily, Disp: 90 capsule, Rfl: 3     rivaroxaban ANTICOAGULANT (XARELTO ANTICOAGULANT) 20 MG TABS tablet, Take 1 tablet (20 mg) by mouth daily (with dinner), Disp: 90 tablet, Rfl: 3     timolol maleate (TIMOPTIC) 0.5 % ophthalmic solution, Place 1 drop into both eyes every morning Wait at least 5 minutes between drops if using more than one at a time., Disp: 15 mL, Rfl: 4     VITAMIN B-12 1000 MCG/ML IJ SOLN, 1000 MCG Monthly, Disp: , Rfl: 0     VITAMIN-B COMPLEX OR TABS, 1 tablet daily, Disp: , Rfl:     Allergies -   Allergies   Allergen Reactions     Penicillins Hives     Throat closed       Social History -   Social History     Social History     Marital status:      Spouse name: N/A     Number of children: N/A     Years of education: N/A     Social History Main Topics     Smoking status: Former Smoker     Packs/day: 0.50     Years: 38.00     Quit date: 3/1/2008     Smokeless tobacco: Never Used      Comment: 6 cigs per day     Alcohol use No     Drug use: No     Sexual activity: Yes     Partners: Male     Other Topics Concern     Not on file     Social History Narrative    Caffeine intake/servings daily - 2-3    Calcium intake/servings daily - 1-2+ plus 1000mg calcium supp    Exercise 0 times weekly - describe    Sunscreen used - Yes    Seatbelts used - Yes    Guns stored in the home - No    Self Breast Exam - Yes    Pap test up to date -  Yes     Eye exam up to date -  Yes    Dental exam up to date -  Yes    DEXA scan up to date -  2005    Flex Sig/Colonoscopy up to date -  Yes 2006    Mammography up to date -  Yes 4/2006    Immunizations reviewed and up to date - Yes 1998    Abuse: Current or Past (Physical, Sexual or Emotional) - No    Do you feel safe in your environment - Yes    Do you cope well with  stress - Yes    Do you suffer from insomnia - No    Last updated by: Char Strauss 3/8/2007       Family History -   Family History   Problem Relation Age of Onset     Circulatory Mother         phelbities     Genetic Disorder Mother         factor V leiden     Heart Disease Father         dysrythmia's     Glaucoma Father      Unknown/Adopted Maternal Grandmother      Gynecology Maternal Grandfather      Cerebrovascular Disease Paternal Grandmother      Cerebrovascular Disease Paternal Grandfather      Alcohol/Drug Brother         alcohol and drugs     Anxiety Disorder Brother      Substance Abuse Brother      Alcohol/Drug Sister         alcohol and drugs     Anxiety Disorder Sister      Substance Abuse Sister      Heart Disease Son         clogged arteries     Cardiovascular Son         sudden heart attack     Breast Cancer Cousin      Colon Cancer Other      Macular Degeneration No family hx of        Review of Systems - As per HPI and PMHx, otherwise 10+ system review of the head and neck, and general constitution is negative.    Physical Exam  There were no vitals taken for this visit.    General - The patient is well nourished and well developed, and appears to have good nutritional status.  Alert and oriented to person and place, answers questions and cooperates with examination appropriately.   Head and Face - Normocephalic and atraumatic, with no gross asymmetry noted of the contour of the facial features.  The facial nerve is intact, with strong symmetric movements.  Voice and Breathing - The patient was breathing comfortably without the use of accessory muscles. There was no wheezing, stridor, or stertor.  The patients voice was clear and strong, and had appropriate pitch and quality.  Eyes - Extraocular movements intact, and the pupils were reactive to light.  Sclera were not icteric or injected, conjunctiva were pink and moist.    Cerumen Removal    Physical Exam and Procedure  Ears - On examination of the  ears, I found that the BOTH ears were completely impacted with dense cerumen.  Therefore, I positioned them in the examination chair in a semi-supine position, beginning with the right side.  I used the binocular surgical microscope to perform cerumen removal.  I began by using a cerumen loop to gently lift the edges of the cerumen mass away from the walls of the external canal.  Once I did this, I was able to suction away fragments of wax and debris using suction.  Once the mass was loose enough, the entire plug was pulled from the canal.  The tympanic membrane was intact, no sign of perforation or middle ear effusion.    I turned my attention to the contralateral side once again using the binocular surgical microscope to perform cerumen removal.  I began by using a cerumen loop to gently lift the edges of the cerumen mass away from the walls of the external canal.  Once I did this, I was able to suction away fragments of wax and debris using suction.  Once the mass was loose enough, the entire plug was pulled from the canal.  The tympanic membrane was intact, no sign of perforation or middle ear effusion.      A/P - Kathya Breaux is a 78 year old female  (H61.23) Bilateral impacted cerumen  (primary encounter diagnosis)  (H93.8X3) Sensation of fullness in both ears    The patient has had their cerumen procedurally removed today.  I have discussed ear care at home, including avoiding qtips or any other object placed in the canal.  I have also discussed that over the counter cerumen kits like Debrox or Cerumenex can be useful.    If no other issues, follow up with me in 3-6 months for an ear check.        Again, thank you for allowing me to participate in the care of your patient.        Sincerely,        Oscar Moore MD

## 2024-08-02 DIAGNOSIS — D68.51 FACTOR V LEIDEN MUTATION (H): ICD-10-CM

## 2024-08-02 DIAGNOSIS — Z86.718 PERSONAL HISTORY OF DVT (DEEP VEIN THROMBOSIS): ICD-10-CM

## 2024-08-03 RX ORDER — RIVAROXABAN 20 MG/1
20 TABLET, FILM COATED ORAL
Qty: 100 TABLET | Refills: 0 | Status: SHIPPED | OUTPATIENT
Start: 2024-08-03 | End: 2024-08-16

## 2024-08-05 ENCOUNTER — TRANSFERRED RECORDS (OUTPATIENT)
Dept: HEALTH INFORMATION MANAGEMENT | Facility: CLINIC | Age: 79
End: 2024-08-05
Payer: COMMERCIAL

## 2024-08-05 LAB
ALT SERPL-CCNC: 8 IU/L (ref 0–32)
AST SERPL-CCNC: 19 IU/L (ref 0–40)

## 2024-08-07 ENCOUNTER — OFFICE VISIT (OUTPATIENT)
Dept: FAMILY MEDICINE | Facility: CLINIC | Age: 79
End: 2024-08-07
Payer: COMMERCIAL

## 2024-08-07 VITALS
HEIGHT: 64 IN | RESPIRATION RATE: 16 BRPM | WEIGHT: 115 LBS | OXYGEN SATURATION: 99 % | SYSTOLIC BLOOD PRESSURE: 150 MMHG | TEMPERATURE: 98.1 F | BODY MASS INDEX: 19.63 KG/M2 | HEART RATE: 73 BPM | DIASTOLIC BLOOD PRESSURE: 87 MMHG

## 2024-08-07 DIAGNOSIS — J01.10 ACUTE NON-RECURRENT FRONTAL SINUSITIS: Primary | ICD-10-CM

## 2024-08-07 PROCEDURE — 99213 OFFICE O/P EST LOW 20 MIN: CPT

## 2024-08-07 RX ORDER — DOXYCYCLINE 100 MG/1
100 CAPSULE ORAL 2 TIMES DAILY
Qty: 20 CAPSULE | Refills: 0 | Status: SHIPPED | OUTPATIENT
Start: 2024-08-07 | End: 2024-08-17

## 2024-08-07 RX ORDER — FLUTICASONE PROPIONATE 50 MCG
1 SPRAY, SUSPENSION (ML) NASAL DAILY
Qty: 9.9 ML | Refills: 0 | Status: SHIPPED | OUTPATIENT
Start: 2024-08-07

## 2024-08-07 NOTE — PATIENT INSTRUCTIONS
Hold calcium while taking doxycyline.   Take doxycyline twice a day for 10 days  Use flonase and saline spray daily

## 2024-08-07 NOTE — PROGRESS NOTES
Assessment & Plan     Acute non-recurrent frontal sinusitis  Labs completed at [facility] indicating elevated WBC. Patient reports sinus symptoms for three weeks. Doxycyline twice daily prescribed along with Flonase and saline spray. Plan for patient to follow up with Dr. Justice next week during physical.   - doxycycline hyclate (VIBRAMYCIN) 100 MG capsule; Take 1 capsule (100 mg) by mouth 2 times daily for 10 days  - fluticasone (FLONASE) 50 MCG/ACT nasal spray; Spray 1 spray into both nostrils daily  - sodium chloride (OCEAN) 0.65 % nasal spray; One spray in each nostril daily    Carol Rasheed is a 78 year old, presenting for the following health issues:  Sinus Problem        8/7/2024     2:06 PM   Additional Questions   Roomed by ross styles     History of Present Illness       Reason for visit:  Possible sinus infection  Symptom onset:  1-2 weeks ago  Symptoms include:  Stuffed head, plugged ears, phlegm in throat  Symptom intensity:  Moderate  Symptom progression:  Staying the same  Had these symptoms before:  Yes  Has tried/received treatment for these symptoms:  Yes  Previous treatment was successful:  Yes  Prior treatment description:  ZPac antibiotic  What makes it worse:  No  What makes it better:  No    She eats 2-3 servings of fruits and vegetables daily.She consumes 1 sweetened beverage(s) daily.She exercises with enough effort to increase her heart rate 9 or less minutes per day.  She exercises with enough effort to increase her heart rate 7 days per week.   She is taking medications regularly.       Acute Illness  Acute illness concerns: sinus infection  Onset/Duration: about 1 month  Symptoms:  Fever: No  Chills/Sweats: No  Headache (location?): YES  Sinus Pressure: YES  Conjunctivitis:  No  Ear Pain: YES- plugged  Rhinorrhea: YES  Congestion: YES  Sore Throat: YES  Cough: YES-productive of yellow sputum (due to post nasal drainage)  Wheeze: No  Decreased Appetite: YES  Nausea: No  Vomiting:  "No  Diarrhea: No  Dysuria/Freq.: No  Dysuria or Hematuria: No  Fatigue/Achiness: YES  Sick/Strep Exposure: No  Therapies tried and outcome: tylenol sinus helped at first but stopped    Head feels full, post nasal drainage causing cough, congestion, ear fullness. Has been going on for about 3 weeks. Fullness to frontal sinuses no tenderness        Objective    BP (!) 150/87 (BP Location: Left arm, Patient Position: Sitting, Cuff Size: Adult Small)   Pulse 73   Temp 98.1  F (36.7  C) (Oral)   Resp 16   Ht 1.619 m (5' 3.75\")   Wt 52.2 kg (115 lb)   SpO2 99%   BMI 19.89 kg/m    Body mass index is 19.89 kg/m .  Physical Exam   GENERAL: alert and no distress  EYES: Eyes grossly normal to inspection, PERRL and conjunctivae and sclerae normal  HENT: ear canals and TM's normal, nose and mouth without ulcers or lesions  RESP: lungs clear to auscultation - no rales, rhonchi or wheezes  CV: regular rate and rhythm, normal S1 S2, no S3 or S4, no murmur, click or rub, no peripheral edema            Signed Electronically by: JULIETH Dorantes CNP    "

## 2024-08-13 SDOH — HEALTH STABILITY: PHYSICAL HEALTH: ON AVERAGE, HOW MANY MINUTES DO YOU ENGAGE IN EXERCISE AT THIS LEVEL?: PATIENT DECLINED

## 2024-08-13 SDOH — HEALTH STABILITY: PHYSICAL HEALTH
ON AVERAGE, HOW MANY DAYS PER WEEK DO YOU ENGAGE IN MODERATE TO STRENUOUS EXERCISE (LIKE A BRISK WALK)?: PATIENT DECLINED

## 2024-08-13 ASSESSMENT — SOCIAL DETERMINANTS OF HEALTH (SDOH): HOW OFTEN DO YOU GET TOGETHER WITH FRIENDS OR RELATIVES?: PATIENT DECLINED

## 2024-08-16 ENCOUNTER — OFFICE VISIT (OUTPATIENT)
Dept: INTERNAL MEDICINE | Facility: CLINIC | Age: 79
End: 2024-08-16
Attending: INTERNAL MEDICINE
Payer: COMMERCIAL

## 2024-08-16 VITALS
DIASTOLIC BLOOD PRESSURE: 79 MMHG | WEIGHT: 115 LBS | SYSTOLIC BLOOD PRESSURE: 155 MMHG | HEIGHT: 64 IN | HEART RATE: 70 BPM | RESPIRATION RATE: 16 BRPM | TEMPERATURE: 98.1 F | OXYGEN SATURATION: 97 % | BODY MASS INDEX: 19.63 KG/M2

## 2024-08-16 DIAGNOSIS — Z23 NEED FOR SHINGLES VACCINE: ICD-10-CM

## 2024-08-16 DIAGNOSIS — Z00.00 WELLNESS EXAMINATION: Primary | ICD-10-CM

## 2024-08-16 DIAGNOSIS — R03.0 ELEVATED BLOOD PRESSURE READING WITHOUT DIAGNOSIS OF HYPERTENSION: ICD-10-CM

## 2024-08-16 DIAGNOSIS — E83.42 HYPOMAGNESEMIA: Primary | ICD-10-CM

## 2024-08-16 DIAGNOSIS — E83.42 HYPOMAGNESEMIA: ICD-10-CM

## 2024-08-16 DIAGNOSIS — H61.23 IMPACTED CERUMEN OF BOTH EARS: ICD-10-CM

## 2024-08-16 DIAGNOSIS — Z86.718 PERSONAL HISTORY OF DVT (DEEP VEIN THROMBOSIS): ICD-10-CM

## 2024-08-16 DIAGNOSIS — M81.0 AGE-RELATED OSTEOPOROSIS WITHOUT CURRENT PATHOLOGICAL FRACTURE: ICD-10-CM

## 2024-08-16 DIAGNOSIS — K50.818 CROHN'S DISEASE OF BOTH SMALL AND LARGE INTESTINE WITH OTHER COMPLICATION (H): ICD-10-CM

## 2024-08-16 DIAGNOSIS — Z13.29 SCREENING FOR HYPOTHYROIDISM: ICD-10-CM

## 2024-08-16 DIAGNOSIS — D68.51 FACTOR V LEIDEN MUTATION (H): ICD-10-CM

## 2024-08-16 DIAGNOSIS — Z13.6 CARDIOVASCULAR SCREENING; LDL GOAL LESS THAN 130: ICD-10-CM

## 2024-08-16 LAB
ANION GAP SERPL CALCULATED.3IONS-SCNC: 9 MMOL/L (ref 7–15)
BASOPHILS # BLD AUTO: 0 10E3/UL (ref 0–0.2)
BASOPHILS NFR BLD AUTO: 1 %
BUN SERPL-MCNC: 12.3 MG/DL (ref 8–23)
CALCIUM SERPL-MCNC: 9 MG/DL (ref 8.8–10.4)
CHLORIDE SERPL-SCNC: 105 MMOL/L (ref 98–107)
CHOLEST SERPL-MCNC: 155 MG/DL
CREAT SERPL-MCNC: 0.65 MG/DL (ref 0.51–0.95)
EGFRCR SERPLBLD CKD-EPI 2021: 90 ML/MIN/1.73M2
EOSINOPHIL # BLD AUTO: 0 10E3/UL (ref 0–0.7)
EOSINOPHIL NFR BLD AUTO: 1 %
ERYTHROCYTE [DISTWIDTH] IN BLOOD BY AUTOMATED COUNT: 13.9 % (ref 10–15)
FASTING STATUS PATIENT QL REPORTED: NORMAL
FASTING STATUS PATIENT QL REPORTED: NORMAL
GLUCOSE SERPL-MCNC: 96 MG/DL (ref 70–99)
HCO3 SERPL-SCNC: 29 MMOL/L (ref 22–29)
HCT VFR BLD AUTO: 37.9 % (ref 35–47)
HDLC SERPL-MCNC: 62 MG/DL
HGB BLD-MCNC: 12.6 G/DL (ref 11.7–15.7)
IMM GRANULOCYTES # BLD: 0 10E3/UL
IMM GRANULOCYTES NFR BLD: 0 %
LDLC SERPL CALC-MCNC: 71 MG/DL
LYMPHOCYTES # BLD AUTO: 1.1 10E3/UL (ref 0.8–5.3)
LYMPHOCYTES NFR BLD AUTO: 18 %
MAGNESIUM SERPL-MCNC: 0.9 MG/DL (ref 1.7–2.3)
MCH RBC QN AUTO: 33.2 PG (ref 26.5–33)
MCHC RBC AUTO-ENTMCNC: 33.2 G/DL (ref 31.5–36.5)
MCV RBC AUTO: 100 FL (ref 78–100)
MONOCYTES # BLD AUTO: 0.5 10E3/UL (ref 0–1.3)
MONOCYTES NFR BLD AUTO: 9 %
NEUTROPHILS # BLD AUTO: 4.5 10E3/UL (ref 1.6–8.3)
NEUTROPHILS NFR BLD AUTO: 72 %
NONHDLC SERPL-MCNC: 93 MG/DL
PLATELET # BLD AUTO: 238 10E3/UL (ref 150–450)
POTASSIUM SERPL-SCNC: 4.1 MMOL/L (ref 3.4–5.3)
PTH-INTACT SERPL-MCNC: 45 PG/ML (ref 15–65)
RBC # BLD AUTO: 3.8 10E6/UL (ref 3.8–5.2)
SODIUM SERPL-SCNC: 143 MMOL/L (ref 135–145)
TRIGL SERPL-MCNC: 112 MG/DL
TSH SERPL DL<=0.005 MIU/L-ACNC: 0.81 UIU/ML (ref 0.3–4.2)
VIT D+METAB SERPL-MCNC: 45 NG/ML (ref 20–50)
WBC # BLD AUTO: 6.2 10E3/UL (ref 4–11)

## 2024-08-16 PROCEDURE — 82306 VITAMIN D 25 HYDROXY: CPT | Performed by: INTERNAL MEDICINE

## 2024-08-16 PROCEDURE — 85025 COMPLETE CBC W/AUTO DIFF WBC: CPT | Performed by: INTERNAL MEDICINE

## 2024-08-16 PROCEDURE — 84443 ASSAY THYROID STIM HORMONE: CPT | Performed by: INTERNAL MEDICINE

## 2024-08-16 PROCEDURE — 83735 ASSAY OF MAGNESIUM: CPT | Performed by: INTERNAL MEDICINE

## 2024-08-16 PROCEDURE — 83970 ASSAY OF PARATHORMONE: CPT | Performed by: INTERNAL MEDICINE

## 2024-08-16 PROCEDURE — 69209 REMOVE IMPACTED EAR WAX UNI: CPT | Performed by: INTERNAL MEDICINE

## 2024-08-16 PROCEDURE — G0439 PPPS, SUBSEQ VISIT: HCPCS | Performed by: INTERNAL MEDICINE

## 2024-08-16 PROCEDURE — 80061 LIPID PANEL: CPT | Performed by: INTERNAL MEDICINE

## 2024-08-16 PROCEDURE — 80048 BASIC METABOLIC PNL TOTAL CA: CPT | Performed by: INTERNAL MEDICINE

## 2024-08-16 PROCEDURE — 36415 COLL VENOUS BLD VENIPUNCTURE: CPT | Performed by: INTERNAL MEDICINE

## 2024-08-16 RX ORDER — MAGNESIUM OXIDE 400 MG/1
400 TABLET ORAL 2 TIMES DAILY
Qty: 60 TABLET | Refills: 0 | Status: SHIPPED | OUTPATIENT
Start: 2024-08-16

## 2024-08-16 RX ORDER — ACETAMINOPHEN 160 MG
1 TABLET,DISINTEGRATING ORAL SEE ADMIN INSTRUCTIONS
Status: SHIPPED | OUTPATIENT
Start: 2024-08-23

## 2024-08-16 NOTE — PROGRESS NOTES
Preventive Care Visit  [facility] AJ Justice MD, Internal Medicine  Aug 16, 2024        Assessment & Plan     Wellness examination  Routine screening issues, see orders section of this encounter   - REVIEW OF HEALTH MAINTENANCE PROTOCOL ORDERS  - Basic metabolic panel  (Ca, Cl, CO2, Creat, Gluc, K, Na, BUN); Future  - CBC with platelets and differential; Future  - Basic metabolic panel  (Ca, Cl, CO2, Creat, Gluc, K, Na, BUN)  - CBC with platelets and differential    Need for shingles vaccine  Declines     Crohn's disease of both small and large intestine with other complication (H)  Noted as a point of historical importance , she has quarterly office visits ;laboratory studies and office visits with [facility] for management of her disease. She sees [facility]   - Basic metabolic panel  (Ca, Cl, CO2, Creat, Gluc, K, Na, BUN); Future  - CBC with platelets and differential; Future  - Basic metabolic panel  (Ca, Cl, CO2, Creat, Gluc, K, Na, BUN)  - CBC with platelets and differential    Factor V Leiden mutation (H24)  This was diagnosed after deep vein thrombosis and evaluation and management with a hematologist . She has been chronically anticoagulated ever since with direct oral anticoagulant (DOAC)   - rivaroxaban ANTICOAGULANT (XARELTO ANTICOAGULANT) 20 MG TABS tablet; Take 1 tablet (20 mg) by mouth daily (with dinner)  - Basic metabolic panel  (Ca, Cl, CO2, Creat, Gluc, K, Na, BUN); Future  - CBC with platelets and differential; Future  - Basic metabolic panel  (Ca, Cl, CO2, Creat, Gluc, K, Na, BUN)  - CBC with platelets and differential    Age-related osteoporosis without current pathological fracture  We reviewed this in significant detail . She had an abnormal DEXA scan with convincing evidence of osteoporosis and had severe GI side effects from Fosamax (alendronate) and was initially hesitant to unwilling to consider treatment of her  osteoporosis however after we reviewed the facts about this diagnosis I convinced her to begin treatment with Denosumab injection (Prolia) , presuming first of all that all her laboratory studies are unremarkable . Once we review her laboratory studies I plan to place [Denosumab injection (Prolia) orders  - Vitamin D Deficiency; Future  - Parathyroid Hormone Intact; Future  - Magnesium; Future  - Vitamin D Deficiency  - Parathyroid Hormone Intact  - Magnesium    Impacted cerumen of both ears  Successfully irrigated out by my medical assistant    - [place] REMOVAL IMPACTED CERUMEN IRRIGATION/LVG UNILAT (RN/MA); Standing  - hydrogen peroxide 3 % solution 1 mL    Factor V Leiden Heterozygote  As detailed above   - rivaroxaban ANTICOAGULANT (XARELTO ANTICOAGULANT) 20 MG TABS tablet; Take 1 tablet (20 mg) by mouth daily (with dinner)  - Basic metabolic panel  (Ca, Cl, CO2, Creat, Gluc, K, Na, BUN); Future  - CBC with platelets and differential; Future  - Basic metabolic panel  (Ca, Cl, CO2, Creat, Gluc, K, Na, BUN)  - CBC with platelets and differential    Personal history of DVT (deep vein thrombosis)  As detailed above   - rivaroxaban ANTICOAGULANT (XARELTO ANTICOAGULANT) 20 MG TABS tablet; Take 1 tablet (20 mg) by mouth daily (with dinner)  - Basic metabolic panel  (Ca, Cl, CO2, Creat, Gluc, K, Na, BUN); Future  - CBC with platelets and differential; Future  - Basic metabolic panel  (Ca, Cl, CO2, Creat, Gluc, K, Na, BUN)  - CBC with platelets and differential    Elevated blood pressure reading without diagnosis of hypertension  Today her blood pressure elevation seems an aberration to me. I did not diagnose  hypertension but rather elevated BP without a diagnosis of hypertension and recommended instead a follow up appointment in one month or so for an medical assistant blood pressure recheck    - Basic metabolic panel  (Ca, Cl, CO2, Creat, Gluc, K, Na, BUN); Future  - CBC with platelets and differential; Future  - Basic  metabolic panel  (Ca, Cl, CO2, Creat, Gluc, K, Na, BUN)  - CBC with platelets and differential    CARDIOVASCULAR SCREENING; LDL GOAL LESS THAN 130  Routine screening   - Lipid panel reflex to direct LDL Fasting; Future  - Lipid panel reflex to direct LDL Fasting    Screening for hypothyroidism  Actually ordered more for the unlikely but possible contributing factors of thyroid function and osteoporosis   - TSH with free T4 reflex; Future  - TSH with free T4 reflex    Patient has been advised of split billing requirements and indicates understanding: Yes        Counseling  Appropriate preventive services were addressed with this patient via screening, questionnaire, or discussion as appropriate for fall prevention, nutrition, physical activity, Tobacco-use cessation, social engagement, weight loss and cognition.  Checklist reviewing preventive services available has been given to the patient.  Reviewed patient's diet, addressing concerns and/or questions.   Discussed possible causes of fatigue.       Carol Rasheed is a 78 year old, presenting for the following:  Wellness Visit    Patient does do ok and lives with Chrohn's disease, follows with [facility] and she has blood work every 3-4 months . She was told once her counts ere up and ultimately we found this was diagnosed as a sinus infection and has been on antibiotics since lateral week, her mucus is more loose now . She took a home Coronavirus (COVID-19) test and was negative.          8/16/2024     9:06 AM   Additional Questions   Roomed by emiliano         Health Care Directive  Patient does not have a Health Care Directive or Living Will: Discussed advance care planning with patient; however, patient declined at this time.    HPI    Patient states she does have a living will         8/13/2024   General Health   How would you rate your overall physical health? Good   Feel stress (tense, anxious, or unable to sleep) Not at all             8/13/2024   Nutrition   Diet: Regular (no restrictions)      Wt Readings from Last 5 Encounters:   08/16/24 52.2 kg (115 lb)   08/07/24 52.2 kg (115 lb)   04/22/24 53.5 kg (118 lb)   04/03/24 53.8 kg (118 lb 8 oz)   09/18/23 55.3 kg (122 lb)     Body mass index is 19.89 kg/m .        8/13/2024   Exercise   Days per week of moderate/strenous exercise Patient declined   Average minutes spent exercising at this level Patient declined      Patient has slowed and is not showing any signs      8/13/2024   Social Factors   Frequency of gathering with friends or relatives Patient declined   Worry food won't last until get money to buy more No   Food not last or not have enough money for food? No   Do you have housing? (Housing is defined as stable permanent housing and does not include staying ouside in a car, in a tent, in an abandoned building, in an overnight shelter, or couch-surfing.) Yes   Are you worried about losing your housing? No   Lack of transportation? No   Unable to get utilities (heat,electricity)? No            8/13/2024   Fall Risk   Fallen 2 or more times in the past year? No    No   Trouble with walking or balance? No    No       Multiple values from one day are sorted in reverse-chronological order          8/13/2024   Activities of Daily Living- Home Safety   Needs help with the following daily activites None of the above   Safety concerns in the home None of the above            8/13/2024   Dental   Dentist two times every year? Yes      Wears hearing aids - sees Dr. Moore and Wagner Mendoza, Audiologist with [facility]  , where she needs cerumen impaction cleaned every third month       8/13/2024   Hearing Screening   Hearing concerns? None of the above            8/13/2024   Driving Risk Screening   Patient/family members have concerns about driving No            8/13/2024   General Alertness/Fatigue Screening   Have you been more tired than usual lately? (!) YES       This has been some acute symptoms since her diagnosis of sinus infection , has lingered for probably 3 weeks      2024   Urinary Incontinence Screening   Bothered by leaking urine in past 6 months No            2024   TB Screening   Were you born outside of the [place]? No            Today's PHQ-2 Score:       2024     8:50 AM   PHQ-2 (  [employer])   Q1: Little interest or pleasure in doing things 0   Q2: Feeling down, depressed or hopeless 0   PHQ-2 Score 0   Q1: Little interest or pleasure in doing things Not at all   Q2: Feeling down, depressed or hopeless Not at all   PHQ-2 Score 0           2024   Substance Use   Alcohol more than 3/day or more than 7/wk No   Do you have a current opioid prescription? No   How severe/bad is pain from 1 to 10? 0/10 (No Pain)   Do you use any other substances recreationally? No        Social History     Tobacco Use    Smoking status: Former     Current packs/day: 0.00     Average packs/day: 0.5 packs/day for 38.0 years (19.0 ttl pk-yrs)     Types: Cigarettes     Start date: 3/1/1970     Quit date: 3/1/2008     Years since quittin.4    Smokeless tobacco: Never    Tobacco comments:     6 cigs per day   Vaping Use    Vaping status: Never Used   Substance Use Topics    Alcohol use: No    Drug use: No     Still has mammogram             2024   LAST FHS-7 RESULTS   1st degree relative breast or ovarian cancer No   Any relative bilateral breast cancer No   Any male have breast cancer No   Any ONE woman have BOTH breast AND ovarian cancer No   Any woman with breast cancer before 50yrs No   2 or more relatives with breast AND/OR ovarian cancer No   2 or more relatives with breast AND/OR bowel cancer No           Mammogram Screening - After age 74- determine frequency with patient based on health status, life expectancy and patient goals    ASCVD Risk   The 10-year ASCVD risk score (Kellee MARTINEZ, et al., 2019) is: 29.3%    Values used to calculate the  score:      Age: 78 years      Sex: Female      Is Non- : No      Diabetic: No      Tobacco smoker: No      Systolic Blood Pressure: 150 mmHg      Is BP treated: No      HDL Cholesterol: 70 mg/dL      Total Cholesterol: 202 mg/dL    Her last DEXA scan was 1/2022 and treatment with 3920-7873 milligram of calcium and 2000 international units of vitamin D a day - treated with Fosamax (alendronate) and had problems           Reviewed and updated as needed this visit by Provider                    Past Medical History:   Diagnosis Date    Arthritis     CARDIOVASCULAR SCREENING; LDL GOAL LESS THAN 130 10/31/2010    age, fhx    DVT of lower extremity (deep venous thrombosis) (H) 11/2009    rt. heterozygote. has seen heme-onc    Ex-cigarette smoker     Factor V Leiden Heterozygote     Factor V Leiden mutation (H24)     heterozygote. has seen heme-onc    Glaucoma (increased eye pressure)     Glaucoma suspect, bilateral     History of blood transfusion 1969    Nonsenile cataract     Regional enteritis of small intestine with large intestine (H)     chrohn's. [facility] Rakesh     Past Surgical History:   Procedure Laterality Date    ABDOMEN SURGERY  3 events    Bowel resec. in 1964, bowel resec in 1969    APPENDECTOMY      first bowel surgery    COLONOSCOPY  05/2016    Q 5 years     COMBINED REPAIR PTOSIS WITH BLEPHAROPLASTY Bilateral 9/18/2023    Procedure: Bilateral upper eyelid blepharoplasty, ptosis repair, and;  Surgeon: Musa Bee MD;  Location: [facility] OR    COSMETIC BLEPHAROPLASTY LOWER LIDS BILATERAL Bilateral 9/18/2023    Procedure: cosmetic bilateral lower eyelid blepharoplasty;  Surgeon: Musa Bee MD;  Location: [facility] OR    ENHANCE LASER REFRACTIVE BILATERAL EXISTING PT IN PARAMETERS      EYE SURGERY  2001    lasik surgery    GYN SURGERY  1980    hysterectomy    REPAIR ECTROPION BILATERAL Bilateral 9/18/2023    Procedure: bilateral lower eyelid ectropion repair;  Surgeon:  "Musa Bee MD;  Location:  OR    [facility] NONSPECIFIC PROCEDURE  1964    3 \" sm. intestine removed-illeitis    Z NONSPECIFIC PROCEDURE      4\" sm. intestine removed-illeitis    [facility] NONSPECIFIC PROCEDURE      Hysterectomy     OB History    Para Term  AB Living   4 2 2 0 0 2   SAB IAB Ectopic Multiple Live Births   0 0 0 0 2      # Outcome Date GA Lbr [place]/2nd Weight Sex Type Anes PTL Lv   4  68 40w0d   M    KAROLINA   3  66 40w0d   F    KAROLINA   2 Term            1 Term              Lab work is in process  Labs reviewed in EPIC  BP Readings from Last 3 Encounters:   24 (!) 155/79   24 (!) 150/87   24 130/81    Wt Readings from Last 3 Encounters:   24 52.2 kg (115 lb)   24 52.2 kg (115 lb)   24 53.5 kg (118 lb)                  Patient Active Problem List   Diagnosis    Personal history of DVT (deep vein thrombosis)    Factor V Leiden Heterozygote    Long-term (current) use of anticoagulants [Z79.01]    Posterior vitreous detachment, bilateral    Hx of LASIK    Crohn's disease of both small and large intestine with other complication (H)    Age-related osteoporosis without current pathological fracture    Combined forms of age-related cataract, mild, of both eyes    Primary open angle glaucoma (POAG) of both eyes    Dermatochalasis of both upper eyelids    Acquired involutional ptosis of eyelid, bilateral    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    History of ptosis repair    History of ectropion repair     Past Surgical History:   Procedure Laterality Date    ABDOMEN SURGERY  3 events    Bowel resec. in , bowel resec in     APPENDECTOMY      first bowel surgery    COLONOSCOPY  2016    Q 5 years     COMBINED REPAIR PTOSIS WITH BLEPHAROPLASTY Bilateral 2023    Procedure: Bilateral upper eyelid blepharoplasty, ptosis repair, and;  Surgeon: Musa Bee MD;  Location: [facility] OR    COSMETIC BLEPHAROPLASTY " "LOWER LIDS BILATERAL Bilateral 2023    Procedure: cosmetic bilateral lower eyelid blepharoplasty;  Surgeon: Musa Bee MD;  Location: [facility] OR    ENHANCE LASER REFRACTIVE BILATERAL EXISTING PT IN PARAMETERS      EYE SURGERY  2001    lasik surgery    GYN SURGERY  1980    hysterectomy    REPAIR ECTROPION BILATERAL Bilateral 2023    Procedure: bilateral lower eyelid ectropion repair;  Surgeon: Musa Bee MD;  Location: [facility] OR    ZZC NONSPECIFIC PROCEDURE  1964    3 \" sm. intestine removed-illeitis    ZZC NONSPECIFIC PROCEDURE  1969    4\" sm. intestine removed-illeitis    ZZC NONSPECIFIC PROCEDURE      Hysterectomy       Social History     Tobacco Use    Smoking status: Former     Current packs/day: 0.00     Average packs/day: 0.5 packs/day for 38.0 years (19.0 ttl pk-yrs)     Types: Cigarettes     Start date: 3/1/1970     Quit date: 3/1/2008     Years since quittin.4    Smokeless tobacco: Never    Tobacco comments:     6 cigs per day   Substance Use Topics    Alcohol use: No     Family History   Problem Relation Age of Onset    Circulatory Mother         phelbities    Genetic Disorder Mother         factor V leiden    Heart Disease Father         dysrythmia's    Glaucoma Father     Unknown/Adopted Maternal Grandmother     Gynecology Maternal Grandfather     Cerebrovascular Disease Paternal Grandmother     Cerebrovascular Disease Paternal Grandfather     Alcohol/Drug Brother         alcohol and drugs    Anxiety Disorder Brother     Substance Abuse Brother     Alcohol/Drug Sister         alcohol and drugs    Anxiety Disorder Sister     Substance Abuse Sister     Heart Disease Son         clogged arteries    Cardiovascular Son         sudden heart attack    Breast Cancer Cousin     Colon Cancer Other     Macular Degeneration No family hx of          Current Outpatient Medications   Medication Sig Dispense Refill    azaTHIOprine (IMURAN) 50 MG tablet Take 1 tablet by mouth daily      B-D " "LUER-ROBERT SYRINGE 25G X 1\" 3 ML MISC USE MONTHLY      CALCIUM 500 + D 500-200 MG-IU OR TABS 1 TABLET DAILY  0    doxycycline hyclate (VIBRAMYCIN) 100 MG capsule Take 1 capsule (100 mg) by mouth 2 times daily for 10 days 20 capsule 0    fluticasone (FLONASE) 50 MCG/ACT nasal spray Spray 1 spray into both nostrils daily 9.9 mL 0    latanoprost (XALATAN) 0.005 % ophthalmic solution Place 1 drop into both eyes every evening 7.5 mL 4    MULTI-VITAMIN OR TABS 1 TABLET DAILY  0    OMEGA 3-6-9 FATTY ACIDS OR 1 TABLET DAILY      omeprazole (PRILOSEC) 20 MG DR capsule Take 1 capsule (20 mg) by mouth daily 90 capsule 3    rivaroxaban ANTICOAGULANT (XARELTO ANTICOAGULANT) 20 MG TABS tablet Take 1 tablet (20 mg) by mouth daily (with dinner) 90 tablet 3    sodium chloride (OCEAN) 0.65 % nasal spray One spray in each nostril daily 15 mL 0    timolol maleate (TIMOPTIC) 0.5 % ophthalmic solution Place 1 drop into both eyes every morning Wait at least 5 minutes between drops if using more than one at a time. 15 mL 4    VITAMIN B-12 1000 MCG/ML IJ SOLN 1000 MCG Monthly  0    VITAMIN-B COMPLEX OR TABS 1 tablet daily       Allergies   Allergen Reactions    Penicillins Hives     Throat closed     Recent Labs   Lab Test 05/06/22  1135 04/06/21  0000 09/28/20  0000 05/18/20  0000 02/24/20  0000 11/01/19  1056 11/27/18  1220 10/16/17  1042   *  --   --   --   --  78  --  108*   HDL 70  --   --   --   --  60  --  62   TRIG 144  --   --   --   --  225*  --  187*   ALT  --  23 23 22   < >  --  22  --    CR 0.82  --   --   --   --  0.70 0.71  --    GFRESTIMATED 74  --   --   --   --  85 81  --    GFRESTBLACK  --   --   --   --   --  >90 >90  --    POTASSIUM 4.8  --   --   --   --  4.2 3.9  --     < > = values in this interval not displayed.      Current providers sharing in care for this patient include:  Patient Care Team:  Navi Justice MD as PCP - General (Internal Medicine)  Candice Mallory RPH as Pharmacist (Pharmacist Ambulatory " "Care)  Maxx Roque MD as Assigned Surgical Provider  Oscar Moore MD as MD (Otolaryngology)  Musa Bee MD as MD (Ophthalmology)  Navi Justice MD as Assigned PCP    The following health maintenance items are reviewed in Epic and correct as of today:  Health Maintenance   Topic Date Due    ZOSTER IMMUNIZATION (1 of 2) Never done    COVID-19 Vaccine (8 - 2023-24 season) 05/17/2024    ANNUAL REVIEW OF HM ORDERS  08/10/2024    MEDICARE ANNUAL WELLNESS VISIT  08/10/2024    INFLUENZA VACCINE (1) 09/01/2024    GLUCOSE  05/06/2025    MAMMO SCREENING  06/07/2025    FALL RISK ASSESSMENT  08/16/2025    COLORECTAL CANCER SCREENING  06/08/2026    LIPID  05/06/2027    ADVANCE CARE PLANNING  09/08/2028    DTAP/TDAP/TD IMMUNIZATION (3 - Td or Tdap) 04/05/2034    DEXA  01/10/2037    HEPATITIS C SCREENING  Completed    PHQ-2 (once per calendar year)  Completed    Pneumococcal Vaccine: 65+ Years  Completed    RSV VACCINE (Pregnancy & 60+)  Completed    IPV IMMUNIZATION  Aged Out    HPV IMMUNIZATION  Aged Out    MENINGITIS IMMUNIZATION  Aged Out    RSV MONOCLONAL ANTIBODY  Aged Out     Health Maintenance Due   Topic Date Due    ZOSTER IMMUNIZATION (1 of 2) Never done    COVID-19 Vaccine (8 - 2023-24 season) 05/17/2024    ANNUAL REVIEW OF HM ORDERS  08/10/2024    MEDICARE ANNUAL WELLNESS VISIT  08/10/2024      BP Readings from Last 6 Encounters:   08/16/24 (!) 155/79   08/07/24 (!) 150/87   07/29/24 130/81   04/22/24 106/61   04/03/24 122/72   01/25/24 128/72           Review of Systems  Constitutional, HEENT, cardiovascular, pulmonary, gi and gu systems are negative, except as otherwise noted.     Objective    Exam  BP (!) 155/79   Pulse 70   Temp 98.1  F (36.7  C) (Temporal)   Resp 16   Ht 1.619 m (5' 3.75\")   Wt 52.2 kg (115 lb)   SpO2 97%   BMI 19.89 kg/m     Estimated body mass index is 19.89 kg/m  as calculated from the following:    Height as of 8/7/24: 1.619 m (5' 3.75\").    Weight as of 8/7/24: " 52.2 kg (115 lb).    Physical Exam  GENERAL: alert and no distress  EYES: Eyes grossly normal to inspection, PERRL and conjunctivae and sclerae normal  HENT: ear canals and TM's normal, nose and mouth without ulcers or lesions  NECK: no adenopathy, no asymmetry, masses, or scars  RESP: lungs clear to auscultation - no rales, rhonchi or wheezes  CV: regular rate and rhythm, normal S1 S2, no S3 or S4, no murmur, click or rub, no peripheral edema  ABDOMEN: soft, nontender, no hepatosplenomegaly, no masses and bowel sounds normal  MS: no gross musculoskeletal defects noted, no edema  SKIN: no suspicious lesions or rashes  NEURO: Normal strength and tone, mentation intact and speech normal  PSYCH: mentation appears normal, affect normal/bright        8/16/2024   Mini Cog   Clock Draw Score 2 Normal   3 Item Recall 3 objects recalled   Mini Cog Total Score 5                 Signed Electronically by: MD nixon Anton

## 2024-08-17 RX ORDER — MAGNESIUM OXIDE 400 MG/1
400 TABLET ORAL 2 TIMES DAILY
Qty: 180 TABLET | Refills: 1 | Status: SHIPPED | OUTPATIENT
Start: 2024-08-17

## 2024-08-19 ENCOUNTER — TELEPHONE (OUTPATIENT)
Dept: FAMILY MEDICINE | Facility: CLINIC | Age: 79
End: 2024-08-19
Payer: COMMERCIAL

## 2024-08-19 NOTE — TELEPHONE ENCOUNTER
Attempt #1 to call patient.     RN left voicemail and requested return call to clinic    Calista Brandt RN, BSN  [facility]: [place]

## 2024-08-19 NOTE — TELEPHONE ENCOUNTER
Patient returned call, below message relayed to patient. She was taking Calcium-mag-zinc 500mg/80mg/10mg but stopped this while she was on an antibiotic. She has not resumed this and would like to know if she should stop taking this supplement? Or should she continue this with the prescription that was sent in?    Thank you,  Shara Espino RN

## 2024-08-19 NOTE — TELEPHONE ENCOUNTER
Spoke with pt and notified of below. Agreeable and schedule for lab.    Thanks,  GUILLERMINA Bender  [facility]

## 2024-08-19 NOTE — TELEPHONE ENCOUNTER
That's good information.    I think she could definitely choose to go back to that vitamin instead of magnesium oxide supplements      She can just try the vitamin but we still need the recheck magnesium level prior to consideration of txc with Denosumab injection (Prolia)     Reroute if additional input requested from me     Navi Justice MD

## 2024-08-19 NOTE — TELEPHONE ENCOUNTER
----- Message from Navi Justice sent at 8/17/2024  1:17 PM CDT -----  Please communicate with after the following     She has with these laboratory studies . Everything is fine except for a very low magnesium. I checked this as part of the pre-workup to potentially start this patient on Denosumab injection (Prolia). I can't do this with such a electrolyte abnormality. Lets ask a few questions, I suspect this is secondary to her Chrohn's disease , see list of causes of hypomagnesemia  --  Low magnesium levels, also known as hypomagnesemia, can be caused by a number of factors, including:       Diet  A diet that doesn't include enough magnesium, especially in older people or those who don't eat enough. Cooking and refining food can also significantly reduce magnesium levels.   Digestive issues  Chronic diarrhea, malabsorption syndromes like celiac disease or inflammatory bowel disease, or digestive problems like Crohn's disease can all cause low magnesium levels.   Medications  Some medications, like diuretics, antacids, proton pump inhibitors, aminoglycoside antibiotics, chemotherapy drugs, and anti-rejection drugs, can cause low magnesium levels.   Other factors  Alcoholism, burns, excessive urination, hyperaldosteronism, kidney tubule disorders, malnutrition, chronic stress, and acute pancreatitis can also cause low magnesium levels.       Here's what I propose. Lets start patient on magnesium oxide supplements 400 milligrams 2 times a day and then we will hold off for now on Denosumab injection (Prolia) until we can recheck her magnesium level in a month. I added these orders already to her office visit with me 2 days ago.    Please let me know if patient has questions for me     Navi Justice MD

## 2024-09-10 DIAGNOSIS — K50.818 CROHN'S DISEASE OF BOTH SMALL AND LARGE INTESTINE WITH OTHER COMPLICATION (H): ICD-10-CM

## 2024-09-13 ENCOUNTER — ALLIED HEALTH/NURSE VISIT (OUTPATIENT)
Dept: FAMILY MEDICINE | Facility: CLINIC | Age: 79
End: 2024-09-13
Payer: COMMERCIAL

## 2024-09-13 ENCOUNTER — LAB (OUTPATIENT)
Dept: LAB | Facility: CLINIC | Age: 79
End: 2024-09-13
Payer: COMMERCIAL

## 2024-09-13 VITALS — DIASTOLIC BLOOD PRESSURE: 78 MMHG | SYSTOLIC BLOOD PRESSURE: 124 MMHG | HEART RATE: 78 BPM

## 2024-09-13 DIAGNOSIS — E83.42 HYPOMAGNESEMIA: ICD-10-CM

## 2024-09-13 DIAGNOSIS — R03.0 ELEVATED BLOOD PRESSURE READING WITHOUT DIAGNOSIS OF HYPERTENSION: Primary | ICD-10-CM

## 2024-09-13 LAB — MAGNESIUM SERPL-MCNC: 1.7 MG/DL (ref 1.7–2.3)

## 2024-09-13 PROCEDURE — 36415 COLL VENOUS BLD VENIPUNCTURE: CPT

## 2024-09-13 PROCEDURE — 99207 PR NO CHARGE NURSE ONLY: CPT

## 2024-09-13 PROCEDURE — 83735 ASSAY OF MAGNESIUM: CPT

## 2024-09-13 NOTE — PROGRESS NOTES
BP Readings from Last 6 Encounters:   09/13/24 124/78   08/16/24 (!) 155/79   08/07/24 (!) 150/87   07/29/24 130/81   04/22/24 106/61   04/03/24 122/72     From what I am seeing I do not feel any changes with medications at this time are warranted    Navi Justice MD

## 2024-09-13 NOTE — PROGRESS NOTES
Patient/family and RN relayed provider's message. Patient/family verbalized understanding.     Calista Brandt RN, BSN  [facility]: [place]

## 2024-09-13 NOTE — PROGRESS NOTES
Left message on answering machine for patient to call back to the nurse at [phone].    Samra Meneses RN  [facility]

## 2024-09-13 NOTE — PROGRESS NOTES
I met with Kathya Breaux at the request of Dr. Justice to recheck her blood pressure.  Blood pressure medications on the med list were reviewed with patient.    Patient has taken all medications as per usual regimen: NA  Patient reports tolerating them without any issues or concerns: NA    Vitals:    09/13/24 0955 09/13/24 1014   BP: (!) 145/72 124/78   BP Location: Left arm Right arm   Patient Position: Sitting Sitting   Cuff Size: Adult Regular Adult Regular   Pulse: 81        After 5 minutes, the patient's blood pressure was <140/90, the previous encounter was reviewed, recorded blood pressure below 140/90. Patient was discharged and the note will be sent to the provider for final review.

## 2024-09-26 ENCOUNTER — DOCUMENTATION ONLY (OUTPATIENT)
Dept: OTHER | Facility: CLINIC | Age: 79
End: 2024-09-26
Payer: COMMERCIAL

## 2024-11-01 ENCOUNTER — TRANSFERRED RECORDS (OUTPATIENT)
Dept: HEALTH INFORMATION MANAGEMENT | Facility: CLINIC | Age: 79
End: 2024-11-01
Payer: COMMERCIAL

## 2024-11-01 LAB
ALT SERPL-CCNC: 13 IU/L (ref 0–32)
AST SERPL-CCNC: 21 IU/L (ref 0–40)
CREATININE (EXTERNAL): 0.76 MG/DL (ref 0.57–1)
GFR ESTIMATED (EXTERNAL): 80 ML/MIN/1.73
POTASSIUM (EXTERNAL): 4.7 MMOL/L (ref 3.5–5.2)

## 2024-11-20 ENCOUNTER — APPOINTMENT (OUTPATIENT)
Dept: URBAN - METROPOLITAN AREA CLINIC 254 | Age: 79
Setting detail: DERMATOLOGY
End: 2024-11-26

## 2024-11-20 VITALS — HEIGHT: 63 IN | WEIGHT: 113 LBS

## 2024-11-20 DIAGNOSIS — Z71.89 OTHER SPECIFIED COUNSELING: ICD-10-CM

## 2024-11-20 DIAGNOSIS — D22 MELANOCYTIC NEVI: ICD-10-CM

## 2024-11-20 DIAGNOSIS — L57.8 OTHER SKIN CHANGES DUE TO CHRONIC EXPOSURE TO NONIONIZING RADIATION: ICD-10-CM

## 2024-11-20 DIAGNOSIS — L82.1 OTHER SEBORRHEIC KERATOSIS: ICD-10-CM

## 2024-11-20 DIAGNOSIS — D18.0 HEMANGIOMA: ICD-10-CM

## 2024-11-20 DIAGNOSIS — L81.4 OTHER MELANIN HYPERPIGMENTATION: ICD-10-CM

## 2024-11-20 PROBLEM — D22.5 MELANOCYTIC NEVI OF TRUNK: Status: ACTIVE | Noted: 2024-11-20

## 2024-11-20 PROBLEM — D18.01 HEMANGIOMA OF SKIN AND SUBCUTANEOUS TISSUE: Status: ACTIVE | Noted: 2024-11-20

## 2024-11-20 PROCEDURE — OTHER COUNSELING: OTHER

## 2024-11-20 PROCEDURE — 99213 OFFICE O/P EST LOW 20 MIN: CPT

## 2024-11-20 PROCEDURE — OTHER MIPS QUALITY: OTHER

## 2024-11-20 ASSESSMENT — LOCATION SIMPLE DESCRIPTION DERM: LOCATION SIMPLE: LEFT UPPER BACK

## 2024-11-20 ASSESSMENT — LOCATION DETAILED DESCRIPTION DERM
LOCATION DETAILED: LEFT SUPERIOR MEDIAL UPPER BACK
LOCATION DETAILED: LEFT MEDIAL UPPER BACK

## 2024-11-20 ASSESSMENT — LOCATION ZONE DERM: LOCATION ZONE: TRUNK

## 2024-11-20 NOTE — HPI: FULL BODY SKIN EXAMINATION
What Type Of Note Output Would You Prefer (Optional)?: Bullet Format
What Is The Reason For Today's Visit?: Full Body Skin Examination
What Is The Reason For Today's Visit? (Being Monitored For X): concerning skin lesions on an annual basis
Additional History: The patient presents for a FBSE with no concerns at this time.

## 2024-12-02 NOTE — PROGRESS NOTES
"History of Present Illness - Kathya Breaux is a 79 year old female last seen on 7/29/2024, for procedural removal of severe cerumen impaction.  She is here for 6 month follow up and ear check.    She reports that her ears have progressively felt stuffy again, especially over the last month,  Otherwise, no ear pain, no blood or purulence from the ears.  She denies any vertigo or headache, but the hearing is stuffy.    Past Medical History -   Patient Active Problem List   Diagnosis    Personal history of DVT (deep vein thrombosis)    Factor V Leiden Heterozygote    Long-term (current) use of anticoagulants [Z79.01]    Posterior vitreous detachment, bilateral    Hx of LASIK    Crohn's disease of both small and large intestine with other complication (H)    Age-related osteoporosis without current pathological fracture    Combined forms of age-related cataract, mild, of both eyes    Primary open angle glaucoma (POAG) of both eyes    Dermatochalasis of both upper eyelids    Acquired involutional ptosis of eyelid, bilateral    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    History of ptosis repair    History of ectropion repair    Elevated blood pressure reading without diagnosis of hypertension       Current Medications -   Current Outpatient Medications:     azaTHIOprine (IMURAN) 50 MG tablet, Take 1 tablet by mouth daily, Disp: , Rfl:     B-D LUER-ROBERT SYRINGE 25G X 1\" 3 ML MISC, USE MONTHLY, Disp: , Rfl:     CALCIUM 500 + D 500-200 MG-IU OR TABS, 1 TABLET DAILY, Disp: , Rfl: 0    fluticasone (FLONASE) 50 MCG/ACT nasal spray, Spray 1 spray into both nostrils daily, Disp: 9.9 mL, Rfl: 0    latanoprost (XALATAN) 0.005 % ophthalmic solution, Place 1 drop into both eyes every evening, Disp: 7.5 mL, Rfl: 4    magnesium oxide (MAG-OX) 400 MG tablet, Take 1 tablet (400 mg) by mouth 2 times daily, Disp: 180 tablet, Rfl: 1    magnesium oxide (MAG-OX) 400 MG tablet, Take 1 tablet (400 mg) by mouth 2 times daily, Disp: 60 " tablet, Rfl: 0    MULTI-VITAMIN OR TABS, 1 TABLET DAILY, Disp: , Rfl: 0    OMEGA 3-6-9 FATTY ACIDS OR, 1 TABLET DAILY, Disp: , Rfl:     omeprazole (PRILOSEC) 20 MG DR capsule, TAKE 1 CAPSULE BY MOUTH DAILY, Disp: 100 capsule, Rfl: 2    rivaroxaban ANTICOAGULANT (XARELTO ANTICOAGULANT) 20 MG TABS tablet, Take 1 tablet (20 mg) by mouth daily (with dinner), Disp: 90 tablet, Rfl: 3    sodium chloride (OCEAN) 0.65 % nasal spray, One spray in each nostril daily, Disp: 15 mL, Rfl: 0    timolol maleate (TIMOPTIC) 0.5 % ophthalmic solution, Place 1 drop into both eyes every morning Wait at least 5 minutes between drops if using more than one at a time., Disp: 15 mL, Rfl: 4    VITAMIN B-12 1000 MCG/ML IJ SOLN, 1000 MCG Monthly, Disp: , Rfl: 0    VITAMIN-B COMPLEX OR TABS, 1 tablet daily, Disp: , Rfl:     Current Facility-Administered Medications:     hydrogen peroxide 3 % solution 1 mL, 1 mL, Topical, See Admin Instructions,     Allergies -   Allergies   Allergen Reactions    Penicillins Hives     Throat closed       Social History -   Social History     Social History    Marital status:      Spouse name: N/A    Number of children: N/A    Years of education: N/A     Social History Main Topics    Smoking status: Former Smoker     Packs/day: 0.50     Years: 38.00     Quit date: 3/1/2008    Smokeless tobacco: Never Used      Comment: 6 cigs per day    Alcohol use No    Drug use: No    Sexual activity: Yes     Partners: Male     Other Topics Concern    Not on file     Social History Narrative    Caffeine intake/servings daily - 2-3    Calcium intake/servings daily - 1-2+ plus 1000mg calcium supp    Exercise 0 times weekly - describe    Sunscreen used - Yes    Seatbelts used - Yes    Guns stored in the home - No    Self Breast Exam - Yes    Pap test up to date -  Yes     Eye exam up to date -  Yes    Dental exam up to date -  Yes    DEXA scan up to date -  2005    Flex Sig/Colonoscopy up to date -  Yes 2006    Mammography  up to date -  Yes 4/2006    Immunizations reviewed and up to date - Yes 1998    Abuse: Current or Past (Physical, Sexual or Emotional) - No    Do you feel safe in your environment - Yes    Do you cope well with stress - Yes    Do you suffer from insomnia - No    Last updated by: Char Strauss 3/8/2007       Family History -   Family History   Problem Relation Age of Onset    Circulatory Mother         phelbities    Genetic Disorder Mother         factor V leiden    Heart Disease Father         dysrythmia's    Glaucoma Father     Unknown/Adopted Maternal Grandmother     Gynecology Maternal Grandfather     Cerebrovascular Disease Paternal Grandmother     Cerebrovascular Disease Paternal Grandfather     Alcohol/Drug Brother         alcohol and drugs    Anxiety Disorder Brother     Substance Abuse Brother     Alcohol/Drug Sister         alcohol and drugs    Anxiety Disorder Sister     Substance Abuse Sister     Heart Disease Son         clogged arteries    Cardiovascular Son         sudden heart attack    Breast Cancer Cousin     Colon Cancer Other     Macular Degeneration No family hx of        Review of Systems - As per HPI and PMHx, otherwise 10+ system review of the head and neck, and general constitution is negative.    Physical Exam    /71   Pulse 79   Wt 52.4 kg (115 lb 9.6 oz)   SpO2 97%   BMI 20.00 kg/m      General - The patient is well nourished and well developed, and appears to have good nutritional status.  Alert and oriented to person and place, answers questions and cooperates with examination appropriately.   Head and Face - Normocephalic and atraumatic, with no gross asymmetry noted of the contour of the facial features.  The facial nerve is intact, with strong symmetric movements.  Voice and Breathing - The patient was breathing comfortably without the use of accessory muscles. There was no wheezing, stridor, or stertor.  The patients voice was clear and strong, and had appropriate pitch and  quality.  Eyes - Extraocular movements intact, and the pupils were reactive to light.  Sclera were not icteric or injected, conjunctiva were pink and moist.    Cerumen Removal    Physical Exam and Procedure  Ears - On examination of the ears, I found that the BOTH ears were completely impacted with dense cerumen.  Therefore, I positioned them in the examination chair in a semi-supine position, beginning with the right side.  I used the binocular surgical microscope to perform cerumen removal.  I began by using a cerumen loop to gently lift the edges of the cerumen mass away from the walls of the external canal.  Once I did this, I was able to suction away fragments of wax and debris using suction.  Once the mass was loose enough, the entire plug was pulled from the canal.  The tympanic membrane was intact, no sign of perforation or middle ear effusion.    I turned my attention to the contralateral side once again using the binocular surgical microscope to perform cerumen removal.  I began by using a cerumen loop to gently lift the edges of the cerumen mass away from the walls of the external canal.  Once I did this, I was able to suction away fragments of wax and debris using suction.  Once the mass was loose enough, the entire plug was pulled from the canal.  The tympanic membrane was intact, no sign of perforation or middle ear effusion.      A/P - Kathya Breaux is a 79 year old female  (H61.23) Bilateral impacted cerumen  (primary encounter diagnosis)  (H93.8X3) Sensation of fullness in both ears    The patient has had their cerumen procedurally removed today.  I have discussed ear care at home, including avoiding qtips or any other object placed in the canal.  I have also discussed that over the counter cerumen kits like Debrox or Cerumenex can be useful.    If no other issues, follow up with me in 3-6 months for an ear check.

## 2024-12-09 ENCOUNTER — OFFICE VISIT (OUTPATIENT)
Dept: OTOLARYNGOLOGY | Facility: CLINIC | Age: 79
End: 2024-12-09
Payer: COMMERCIAL

## 2024-12-09 VITALS
HEART RATE: 79 BPM | OXYGEN SATURATION: 97 % | SYSTOLIC BLOOD PRESSURE: 107 MMHG | DIASTOLIC BLOOD PRESSURE: 71 MMHG | WEIGHT: 115.6 LBS | BODY MASS INDEX: 20 KG/M2

## 2024-12-09 DIAGNOSIS — H90.3 SENSORINEURAL HEARING LOSS (SNHL) OF BOTH EARS: ICD-10-CM

## 2024-12-09 DIAGNOSIS — H61.23 BILATERAL IMPACTED CERUMEN: Primary | ICD-10-CM

## 2024-12-09 NOTE — LETTER
"12/9/2024      Kathya Breaux  [address]  [facility] [address]      Dear Colleague,    Thank you for referring your patient, Kathya Breaux, to the [facility]. Please see a copy of my visit note below.    History of Present Illness - Kathya Breaux is a 79 year old female last seen on 7/29/2024, for procedural removal of severe cerumen impaction.  She is here for 6 month follow up and ear check.    She reports that her ears have progressively felt stuffy again, especially over the last month,  Otherwise, no ear pain, no blood or purulence from the ears.  She denies any vertigo or headache, but the hearing is stuffy.    Past Medical History -   Patient Active Problem List   Diagnosis     Personal history of DVT (deep vein thrombosis)     Factor V Leiden Heterozygote     Long-term (current) use of anticoagulants [Z79.01]     Posterior vitreous detachment, bilateral     Hx of LASIK     Crohn's disease of both small and large intestine with other complication (H)     Age-related osteoporosis without current pathological fracture     Combined forms of age-related cataract, mild, of both eyes     Primary open angle glaucoma (POAG) of both eyes     Dermatochalasis of both upper eyelids     Acquired involutional ptosis of eyelid, bilateral     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     History of ptosis repair     History of ectropion repair     Elevated blood pressure reading without diagnosis of hypertension       Current Medications -   Current Outpatient Medications:      azaTHIOprine (IMURAN) 50 MG tablet, Take 1 tablet by mouth daily, Disp: , Rfl:      B-D LUER-ROBERT SYRINGE 25G X 1\" 3 ML MISC, USE MONTHLY, Disp: , Rfl:      CALCIUM 500 + D 500-200 MG-IU OR TABS, 1 TABLET DAILY, Disp: , Rfl: 0     fluticasone (FLONASE) 50 MCG/ACT nasal spray, Spray 1 spray into both nostrils daily, Disp: 9.9 mL, Rfl: 0     latanoprost (XALATAN) 0.005 % ophthalmic solution, Place 1 drop " into both eyes every evening, Disp: 7.5 mL, Rfl: 4     magnesium oxide (MAG-OX) 400 MG tablet, Take 1 tablet (400 mg) by mouth 2 times daily, Disp: 180 tablet, Rfl: 1     magnesium oxide (MAG-OX) 400 MG tablet, Take 1 tablet (400 mg) by mouth 2 times daily, Disp: 60 tablet, Rfl: 0     MULTI-VITAMIN OR TABS, 1 TABLET DAILY, Disp: , Rfl: 0     OMEGA 3-6-9 FATTY ACIDS OR, 1 TABLET DAILY, Disp: , Rfl:      omeprazole (PRILOSEC) 20 MG DR capsule, TAKE 1 CAPSULE BY MOUTH DAILY, Disp: 100 capsule, Rfl: 2     rivaroxaban ANTICOAGULANT (XARELTO ANTICOAGULANT) 20 MG TABS tablet, Take 1 tablet (20 mg) by mouth daily (with dinner), Disp: 90 tablet, Rfl: 3     sodium chloride (OCEAN) 0.65 % nasal spray, One spray in each nostril daily, Disp: 15 mL, Rfl: 0     timolol maleate (TIMOPTIC) 0.5 % ophthalmic solution, Place 1 drop into both eyes every morning Wait at least 5 minutes between drops if using more than one at a time., Disp: 15 mL, Rfl: 4     VITAMIN B-12 1000 MCG/ML IJ SOLN, 1000 MCG Monthly, Disp: , Rfl: 0     VITAMIN-B COMPLEX OR TABS, 1 tablet daily, Disp: , Rfl:     Current Facility-Administered Medications:      hydrogen peroxide 3 % solution 1 mL, 1 mL, Topical, See Admin Instructions,     Allergies -   Allergies   Allergen Reactions     Penicillins Hives     Throat closed       Social History -   Social History     Social History     Marital status:      Spouse name: N/A     Number of children: N/A     Years of education: N/A     Social History Main Topics     Smoking status: Former Smoker     Packs/day: 0.50     Years: 38.00     Quit date: 3/1/2008     Smokeless tobacco: Never Used      Comment: 6 cigs per day     Alcohol use No     Drug use: No     Sexual activity: Yes     Partners: Male     Other Topics Concern     Not on file     Social History Narrative    Caffeine intake/servings daily - 2-3    Calcium intake/servings daily - 1-2+ plus 1000mg calcium supp    Exercise 0 times weekly - describe     Sunscreen used - Yes    Seatbelts used - Yes    Guns stored in the home - No    Self Breast Exam - Yes    Pap test up to date -  Yes     Eye exam up to date -  Yes    Dental exam up to date -  Yes    DEXA scan up to date -  2005    Flex Sig/Colonoscopy up to date -  Yes 2006    Mammography up to date -  Yes 4/2006    Immunizations reviewed and up to date - Yes 1998    Abuse: Current or Past (Physical, Sexual or Emotional) - No    Do you feel safe in your environment - Yes    Do you cope well with stress - Yes    Do you suffer from insomnia - No    Last updated by: Char Strauss 3/8/2007       Family History -   Family History   Problem Relation Age of Onset     Circulatory Mother         phelbities     Genetic Disorder Mother         factor V leiden     Heart Disease Father         dysrythmia's     Glaucoma Father      Unknown/Adopted Maternal Grandmother      Gynecology Maternal Grandfather      Cerebrovascular Disease Paternal Grandmother      Cerebrovascular Disease Paternal Grandfather      Alcohol/Drug Brother         alcohol and drugs     Anxiety Disorder Brother      Substance Abuse Brother      Alcohol/Drug Sister         alcohol and drugs     Anxiety Disorder Sister      Substance Abuse Sister      Heart Disease Son         clogged arteries     Cardiovascular Son         sudden heart attack     Breast Cancer Cousin      Colon Cancer Other      Macular Degeneration No family hx of        Review of Systems - As per HPI and PMHx, otherwise 10+ system review of the head and neck, and general constitution is negative.    Physical Exam    /71   Pulse 79   Wt 52.4 kg (115 lb 9.6 oz)   SpO2 97%   BMI 20.00 kg/m      General - The patient is well nourished and well developed, and appears to have good nutritional status.  Alert and oriented to person and place, answers questions and cooperates with examination appropriately.   Head and Face - Normocephalic and atraumatic, with no gross asymmetry noted of the  contour of the facial features.  The facial nerve is intact, with strong symmetric movements.  Voice and Breathing - The patient was breathing comfortably without the use of accessory muscles. There was no wheezing, stridor, or stertor.  The patients voice was clear and strong, and had appropriate pitch and quality.  Eyes - Extraocular movements intact, and the pupils were reactive to light.  Sclera were not icteric or injected, conjunctiva were pink and moist.    Cerumen Removal    Physical Exam and Procedure  Ears - On examination of the ears, I found that the BOTH ears were completely impacted with dense cerumen.  Therefore, I positioned them in the examination chair in a semi-supine position, beginning with the right side.  I used the binocular surgical microscope to perform cerumen removal.  I began by using a cerumen loop to gently lift the edges of the cerumen mass away from the walls of the external canal.  Once I did this, I was able to suction away fragments of wax and debris using suction.  Once the mass was loose enough, the entire plug was pulled from the canal.  The tympanic membrane was intact, no sign of perforation or middle ear effusion.    I turned my attention to the contralateral side once again using the binocular surgical microscope to perform cerumen removal.  I began by using a cerumen loop to gently lift the edges of the cerumen mass away from the walls of the external canal.  Once I did this, I was able to suction away fragments of wax and debris using suction.  Once the mass was loose enough, the entire plug was pulled from the canal.  The tympanic membrane was intact, no sign of perforation or middle ear effusion.      A/P - Kathya Breaux is a 79 year old female  (H61.23) Bilateral impacted cerumen  (primary encounter diagnosis)  (H93.8X3) Sensation of fullness in both ears    The patient has had their cerumen procedurally removed today.  I have discussed ear care at home, including  avoiding qtips or any other object placed in the canal.  I have also discussed that over the counter cerumen kits like Debrox or Cerumenex can be useful.    If no other issues, follow up with me in 3-6 months for an ear check.        Again, thank you for allowing me to participate in the care of your patient.        Sincerely,        Oscar Moore MD

## 2025-01-13 ENCOUNTER — ALLIED HEALTH/NURSE VISIT (OUTPATIENT)
Dept: AUDIOLOGY | Facility: CLINIC | Age: 80
End: 2025-01-13
Payer: COMMERCIAL

## 2025-01-13 DIAGNOSIS — H90.3 SENSORINEURAL HEARING LOSS, BILATERAL: Primary | ICD-10-CM

## 2025-01-13 PROCEDURE — V5014 HEARING AID REPAIR/MODIFYING: HCPCS | Performed by: AUDIOLOGIST

## 2025-01-13 NOTE — PROGRESS NOTES
HEARING AID DROP-OFF     Background:              Patient dropped off their hearing aid with the report of hearing aid not working                             SIDE: right                          : oticon                          TYPE: OPN S2                          S/N: [id number]                          WARRANTY:      Procedures:              Initially the hearing aid was weak sounding, but after vacuuming the microphone and  ports, and changing the wax filter,  there was good sound quality.      Plan:              Patient was contacted in regards to status of hearing aid dropped off today.      Charges- hearing aid cleaning ($30)       Lara Evans   Doctor of Audiology  License #[id number]

## 2025-01-27 ENCOUNTER — ALLIED HEALTH/NURSE VISIT (OUTPATIENT)
Dept: AUDIOLOGY | Facility: CLINIC | Age: 80
End: 2025-01-27
Payer: COMMERCIAL

## 2025-01-27 DIAGNOSIS — H90.3 SENSORINEURAL HEARING LOSS, BILATERAL: Primary | ICD-10-CM

## 2025-01-27 PROCEDURE — V5014 HEARING AID REPAIR/MODIFYING: HCPCS | Performed by: AUDIOLOGIST

## 2025-01-27 NOTE — PROGRESS NOTES
AUDIOLOGY REPORT: HEARING AID RECHECK    SUBJECTIVE: Kathya Breaux is a 79 year old female, :  1945, was seen in the Audiology Clinic at [facility] on 25 for a return check of their hearing aids. She had dropped of her right hearing aid indicating that it is intermittent.  Due to patient history of cerumen accumulation, I was able to see her today for otoscopy. The patient was unaccompanied.         Procedures:  Otoscopy shows some cerumen in the right and left ears but non-occluding.     Troubleshooting revealed that the speaker wire on the right hearing aid has a short and causes the aid to be intermittent.  The wire was changed (size 2 85), the aids were cleaned and are working well.           SIDE: Right    : Oticon OPNS2    TYPE: RITE    S/N: [id number]     WARRANTY: 2023    She was in agreement with changing the  here today. She understands that there will be a charge for the item of $100.00    Plan:    Patient will return as needed for hearing aid concerns. All questions were fully answered.         Archie Evans.   Doctor of Audiology  License #[id number]

## 2025-01-29 ENCOUNTER — TRANSFERRED RECORDS (OUTPATIENT)
Dept: HEALTH INFORMATION MANAGEMENT | Facility: CLINIC | Age: 80
End: 2025-01-29
Payer: COMMERCIAL

## 2025-01-29 LAB
ALT SERPL-CCNC: 9 IU/L (ref 0–32)
AST SERPL-CCNC: 18 IU/L (ref 0–40)

## 2025-02-21 ENCOUNTER — TRANSFERRED RECORDS (OUTPATIENT)
Dept: HEALTH INFORMATION MANAGEMENT | Facility: CLINIC | Age: 80
End: 2025-02-21
Payer: COMMERCIAL

## 2025-03-07 ENCOUNTER — TRANSFERRED RECORDS (OUTPATIENT)
Dept: HEALTH INFORMATION MANAGEMENT | Facility: CLINIC | Age: 80
End: 2025-03-07
Payer: COMMERCIAL

## 2025-03-24 ENCOUNTER — OFFICE VISIT (OUTPATIENT)
Dept: OPHTHALMOLOGY | Facility: CLINIC | Age: 80
End: 2025-03-24
Payer: COMMERCIAL

## 2025-03-24 DIAGNOSIS — H43.813 POSTERIOR VITREOUS DETACHMENT, BILATERAL: ICD-10-CM

## 2025-03-24 DIAGNOSIS — H25.813 COMBINED FORMS OF AGE-RELATED CATARACT OF BOTH EYES: ICD-10-CM

## 2025-03-24 DIAGNOSIS — Z01.01 ENCOUNTER FOR EXAMINATION OF EYES AND VISION WITH ABNORMAL FINDINGS: ICD-10-CM

## 2025-03-24 DIAGNOSIS — H40.1132 PRIMARY OPEN ANGLE GLAUCOMA (POAG) OF BOTH EYES, MODERATE STAGE: Primary | ICD-10-CM

## 2025-03-24 DIAGNOSIS — Z98.890 HISTORY OF PTOSIS REPAIR: ICD-10-CM

## 2025-03-24 DIAGNOSIS — Z98.890 HISTORY OF ECTROPION REPAIR: ICD-10-CM

## 2025-03-24 DIAGNOSIS — Z98.890 HX OF LASIK: ICD-10-CM

## 2025-03-24 DIAGNOSIS — H52.4 PRESBYOPIA: ICD-10-CM

## 2025-03-24 PROCEDURE — 92014 COMPRE OPH EXAM EST PT 1/>: CPT | Performed by: OPHTHALMOLOGY

## 2025-03-24 PROCEDURE — 92015 DETERMINE REFRACTIVE STATE: CPT | Performed by: OPHTHALMOLOGY

## 2025-03-24 RX ORDER — DORZOLAMIDE HYDROCHLORIDE AND TIMOLOL MALEATE 20; 5 MG/ML; MG/ML
1 SOLUTION/ DROPS OPHTHALMIC 2 TIMES DAILY
Qty: 10 ML | Refills: 4 | Status: SHIPPED | OUTPATIENT
Start: 2025-03-24

## 2025-03-24 ASSESSMENT — CONF VISUAL FIELD
METHOD: COUNTING FINGERS
OD_NORMAL: 1
OS_INFERIOR_NASAL_RESTRICTION: 0
OD_SUPERIOR_TEMPORAL_RESTRICTION: 0
OS_NORMAL: 1
OD_INFERIOR_TEMPORAL_RESTRICTION: 0
OS_SUPERIOR_NASAL_RESTRICTION: 0
OD_SUPERIOR_NASAL_RESTRICTION: 0
OS_INFERIOR_TEMPORAL_RESTRICTION: 0
OD_INFERIOR_NASAL_RESTRICTION: 0
OS_SUPERIOR_TEMPORAL_RESTRICTION: 0

## 2025-03-24 ASSESSMENT — REFRACTION_MANIFEST
OS_CYLINDER: +1.00
OS_AXIS: 152
OS_ADD: +3.00
OS_SPHERE: +0.75
OD_ADD: +3.00
OD_SPHERE: -0.50
OD_AXIS: 007
OD_CYLINDER: +2.00

## 2025-03-24 ASSESSMENT — VISUAL ACUITY
CORRECTION_TYPE: GLASSES
METHOD: SNELLEN - LINEAR
OS_CC+: -1
OD_CC: 20/25
METHOD_MR: DIFFERENT SIGNS
OS_CC: 20/25

## 2025-03-24 ASSESSMENT — SLIT LAMP EXAM - LIDS: COMMENTS: GOOD CREASE

## 2025-03-24 ASSESSMENT — TONOMETRY
OD_IOP_MMHG: 17
OS_IOP_MMHG: 19
IOP_METHOD: APPLANATION

## 2025-03-24 ASSESSMENT — REFRACTION_WEARINGRX
OD_ADD: +2.75
OS_SPHERE: +0.25
OD_SPHERE: -0.75
OD_CYLINDER: +2.00
OS_CYLINDER: +1.00
OS_AXIS: 160
OS_ADD: +2.75
OD_AXIS: 014
SPECS_TYPE: PAL

## 2025-03-24 ASSESSMENT — CUP TO DISC RATIO
OD_RATIO: 0.6
OS_RATIO: 0.6

## 2025-03-24 NOTE — PROGRESS NOTES
" Current Eye Medications:  Latanoprost at Bedtime both eyes . Timolol both eyes AM  and Refresh both eyes a couple times a day as needed      Subjective:  Patient returns for her annual eye health exam and glaucoma follow up.   She has no noticed any changes in her vision since her last visit. Patient denies both floaters and flashing lights. She has no ocular pain or discomfort other than dryness at times. No other ocular concerns today.       Objective:  See Ophthalmology Exam.       Assessment:  Mild worsening of cataracts.  Intraocular pressures too high both eyes.      ICD-10-CM    1. Primary open angle glaucoma (POAG) of both eyes, moderate stage  H40.1132 dorzolamide-timolol (COSOPT) 2-0.5 % ophthalmic solution      2. Combined forms of age-related cataract, mild-mod, of both eyes  H25.813       3. Hx of LASIK  Z98.890       4. History of ptosis repair  Z98.890       5. History of ectropion repair  Z98.890       6. Posterior vitreous detachment, bilateral  H43.813       7. Encounter for examination of eyes and vision with abnormal findings  Z01.01       8. Presbyopia  H52.4 REFRACTIVE STATUS           Plan:  Continue:   Latanoprost (green top) every evening both eyes.    Stop:  Timolol (yellow top) every morning both eyes.      Begin:  Dorzolamide/Timolol (Cosopt - dark blue top) one drop in both eyes twice daily. About 12 hours apart.     May use artificial tears up to four times a day (like Refresh Optive, Systane Balance, or TheraTears. Avoid \"get the red out\" drops and generic artifical tears).     Wait at least 5 minutes between drops if using more than one at a time.     Glasses prescription given - optional    Return visit 3 months for an intraocular pressure check, glaucoma OCT, retinal OCT, and Horta Visual Field.     Maxx Roque M.D.  [phone]       "

## 2025-03-24 NOTE — LETTER
"3/24/2025      Kathya Breaux  [address]  [facility] [address]      Dear Colleague,    Thank you for referring your patient, Kathya Breaux, to the [facility]. Please see a copy of my visit note below.     Current Eye Medications:  Latanoprost at Bedtime both eyes . Timolol both eyes AM  and Refresh both eyes a couple times a day as needed      Subjective:  Patient returns for her annual eye health exam and glaucoma follow up.   She has no noticed any changes in her vision since her last visit. Patient denies both floaters and flashing lights. She has no ocular pain or discomfort other than dryness at times. No other ocular concerns today.       Objective:  See Ophthalmology Exam.       Assessment:  Mild worsening of cataracts.  Intraocular pressures too high both eyes.      ICD-10-CM    1. Primary open angle glaucoma (POAG) of both eyes, moderate stage  H40.1132 dorzolamide-timolol (COSOPT) 2-0.5 % ophthalmic solution      2. Combined forms of age-related cataract, mild-mod, of both eyes  H25.813       3. Hx of LASIK  Z98.890       4. History of ptosis repair  Z98.890       5. History of ectropion repair  Z98.890       6. Posterior vitreous detachment, bilateral  H43.813       7. Encounter for examination of eyes and vision with abnormal findings  Z01.01       8. Presbyopia  H52.4 REFRACTIVE STATUS           Plan:  Continue:   Latanoprost (green top) every evening both eyes.    Stop:  Timolol (yellow top) every morning both eyes.      Begin:  Dorzolamide/Timolol (Cosopt - dark blue top) one drop in both eyes twice daily. About 12 hours apart.     May use artificial tears up to four times a day (like Refresh Optive, Systane Balance, or TheraTears. Avoid \"get the red out\" drops and generic artifical tears).     Wait at least 5 minutes between drops if using more than one at a time.     Glasses prescription given - optional    Return visit 3 months for an intraocular " pressure check, glaucoma OCT, retinal OCT, and Horta Visual Field.     Maxx Roque M.D.  [phone]         Again, thank you for allowing me to participate in the care of your patient.        Sincerely,        Maxx Roque MD    Electronically signed

## 2025-03-24 NOTE — PATIENT INSTRUCTIONS
"Continue:   Latanoprost (green top) every evening both eyes.    Stop:  Timolol (yellow top) every morning both eyes.      Begin:  Dorzolamide/Timolol (Cosopt - dark blue top) one drop in both eyes twice daily. About 12 hours apart.     May use artificial tears up to four times a day (like Refresh Optive, Systane Balance, or TheraTears. Avoid \"get the red out\" drops and generic artifical tears).     Wait at least 5 minutes between drops if using more than one at a time.     Glasses prescription given - optional    Return visit 3 months for an intraocular pressure check, glaucoma OCT, retinal OCT, and Horta Visual Field.     Maxx Roque M.D.  [phone]    "

## 2025-05-08 ENCOUNTER — PATIENT OUTREACH (OUTPATIENT)
Dept: CARE COORDINATION | Facility: CLINIC | Age: 80
End: 2025-05-08
Payer: COMMERCIAL

## 2025-05-13 ENCOUNTER — TELEPHONE (OUTPATIENT)
Dept: AUDIOLOGY | Facility: CLINIC | Age: 80
End: 2025-05-13
Payer: COMMERCIAL

## 2025-05-13 NOTE — TELEPHONE ENCOUNTER
Requested supplies (minifit pro wax traps and 6mm closed domes) placed at  for pick-up. Patient called, voicemail left      Lara Ochoa, [facility]  Licensed Audiologist  [place] #[id number]

## 2025-05-13 NOTE — TELEPHONE ENCOUNTER
[facility] Call Center    Phone Message    May a detailed message be left on voicemail: yes     Reason for Call: Other: Pt called in need of hearing aid supplies. She's requesting a few packs of mini fit pro wax by [employer] and some dommicah. Patient would like to pick these up at the clinic, so please contact patient once they're ready for      Action Taken: Other: FK [employer]    Travel Screening: Not Applicable     Date of Service:

## 2025-06-02 ENCOUNTER — TRANSFERRED RECORDS (OUTPATIENT)
Dept: MULTI SPECIALTY CLINIC | Facility: CLINIC | Age: 80
End: 2025-06-02
Payer: COMMERCIAL

## 2025-06-02 ENCOUNTER — TRANSFERRED RECORDS (OUTPATIENT)
Dept: ADMINISTRATIVE | Facility: CLINIC | Age: 80
End: 2025-06-02
Payer: COMMERCIAL

## 2025-06-02 LAB
ALT SERPL-CCNC: 14 IU/L (ref 0–32)
AST SERPL-CCNC: 28 IU/L (ref 0–40)

## 2025-06-03 NOTE — PROGRESS NOTES
_________________________________________________________________________________________________    Patient name: Kathya Breaux  YOB: 1945  Encounter date: 06/02/2025 09:00 AM  Current date: 06/02/2025 10:13 AM  Procedure: Infusion      Infusion/Additional Medication Orders    Assessment: Crohn's disease of both small and lg int w/o complications K50.80     Medication grids  Order Date Medication Dose Route Rate Rate 2 Rate 3 Rate 4 Int. of Treat.   02/06/2025 Entyvio 300mg  ml/hr    8 Weeks   Inf. Date Medication % Conc. Inf. # Therapy Type Bag # Vials Total mgs Lot # Exp. Date   06/02/2025 Entyvio  4 maintenance  1 300.00 [id number] 07/31/2027   Inf. Date Medication IV Site IV Gauge Start Stop Total Time Wt. (lbs) Wt. (kgs)   06/02/2025 Entyvio left antecubital 24 8:58 AM 9:28 AM 30 minutes 109.40 49.615     Vitals Flowsheet  Time Temp Pulse Resp Sys BP Mary BP Reaction Conc Rate   8:44 AM 97.9 64 16 134 61      9:28 AM 97.5 57 16 121 65        Post Infusion  The patient did tolerate the infusion.     Nursing Notes  Order date: 02/06/25  Last infusion date: 04/04/25  Oral premeds per order: N/A  Specialty Pharmacy: N  Change in health status per assessment? N  IV maintenance 0.9% NS 500ml TKO  Start time: 8:38 AM  IV start by: Santos Ba RN  IV and Fluid D/C time: 9:43 AM  NS volume infused: <50mL  Site condition: CDI and patent throughout infusion, no redness/swelling at IV site  D/C instructions reviewed, Pt verbalized understanding.  Santos Ba RN    *Labs drawn off of IV before infusion*    Date Medication Total mg Used mg Wasted mg   06/02/2025 Entyvio 300.00 300.00 0.00   04/04/2025 Entyvio 300.00 300.00 0.00   03/07/2025 Entyvio 300.00 300.00 0.00   02/21/2025 Entyvio 300.00 300.00 0.00       Visit oversight provided by:  Krista Garcia MD 06/02/2025 09:00 AM

## 2025-06-05 ENCOUNTER — PATIENT OUTREACH (OUTPATIENT)
Dept: CARE COORDINATION | Facility: CLINIC | Age: 80
End: 2025-06-05
Payer: COMMERCIAL

## 2025-06-09 ENCOUNTER — TRANSFERRED RECORDS (OUTPATIENT)
Dept: ADMINISTRATIVE | Facility: CLINIC | Age: 80
End: 2025-06-09
Payer: COMMERCIAL

## 2025-06-10 NOTE — PROCEDURES
2025        Kathya Breaux   [address] [place] [address]      Kathya Breaux,  :  1945    Kathya,  Labs below look good  Hepatic Function Panel (7) 2025 08:46   Description Result Units Flags Range   Bilirubin, Total 0.8 mg/dL  0.0-1.2   Bilirubin, Direct 0.22 mg/dL  0.00-0.40   AST (SGOT) 28 IU/L  0-40   ALT (SGPT) 14 IU/L  0-32   Albumin 4.1 g/dL  3.8-4.8   Alkaline Phosphatase 63 IU/L     Protein, Total 6.3 g/dL  6.0-8.5   Comments   Performed At: [facility], [facility]  [address], [place], [id number]  Emmie Ziegler MD, Phone: [phone]   CBC With Differential/Platelet 2025 08:46   Description Result Units Flags Range   Hemoglobin 13.6 g/dL  11.1-15.9   Hematocrit 41.1 %  34.0-46.6    fL H 79-97   MCHC 33.1 g/dL  31.5-35.7   MCH 34.5 pg H 26.6-33.0   RDW 13.3 %  11.7-15.4   Platelets 239 x10E3/uL  150-450   Neutrophils 76 %  Not Estab.   Lymphs 16 %  Not Estab.   Monocytes 6 %  Not Estab.   Eos 1 %  Not Estab.   Basos 1 %  Not Estab.   Neutrophils (Absolute) 4.7 x10E3/uL  1.4-7.0   Lymphs (Absolute) 1.0 x10E3/uL  0.7-3.1   Monocytes(Absolute) 0.4 x10E3/uL  0.1-0.9   Eos (Absolute) 0.1 x10E3/uL  0.0-0.4   Baso (Absolute) 0.1 x10E3/uL  0.0-0.2   Immature Grans (Abs) 0.0 x10E3/uL  0.0-0.1   Immature Granulocytes 0 %  Not Estab.   RBC 3.94 x10E6/uL  3.77-5.28   WBC 6.3 x10E3/uL  3.4-10.8   Comments   First Available              Performed At: [facility], [facility]  [address] [facility], [place], [place], [id number]  Emmie Ziegler MD, Phone: [phone]         Please call sooner if you have a problem, otherwise I'll see you as we had previously scheduled.  If symptoms persist or if you have questions regarding your results, please call our office.       Thank you.    Electronically signed by:  Oscar Reid MD 2025 05:30 PM  Document generated by:  Oscar Reid MD  2025  If your provider ordered multiple tests; the results may  not become available at the same time.  If multiple test results are received within 14 days of one another, you may receive a duplicate.  cc:  Navi Justice MD  cc:  Babs Scherer MD

## 2025-06-25 ENCOUNTER — OFFICE VISIT (OUTPATIENT)
Dept: OPHTHALMOLOGY | Facility: CLINIC | Age: 80
End: 2025-06-25
Payer: COMMERCIAL

## 2025-06-25 DIAGNOSIS — H40.1132 PRIMARY OPEN ANGLE GLAUCOMA (POAG) OF BOTH EYES, MODERATE STAGE: Primary | ICD-10-CM

## 2025-06-25 PROCEDURE — 92133 CPTRZD OPH DX IMG PST SGM ON: CPT | Performed by: OPHTHALMOLOGY

## 2025-06-25 PROCEDURE — 92012 INTRM OPH EXAM EST PATIENT: CPT | Performed by: OPHTHALMOLOGY

## 2025-06-25 PROCEDURE — 92083 EXTENDED VISUAL FIELD XM: CPT | Performed by: OPHTHALMOLOGY

## 2025-06-25 ASSESSMENT — REFRACTION_WEARINGRX
OS_ADD: +2.75
OD_SPHERE: -0.75
OS_SPHERE: +0.25
OD_AXIS: 014
SPECS_TYPE: PAL
OS_AXIS: 160
OD_ADD: +2.75
OD_CYLINDER: +2.00
OS_CYLINDER: +1.00

## 2025-06-25 ASSESSMENT — EXTERNAL EXAM - LEFT EYE: OS_EXAM: NORMAL

## 2025-06-25 ASSESSMENT — PACHYMETRY
OS_CT(UM): 472
OD_CT(UM): 468

## 2025-06-25 ASSESSMENT — TONOMETRY
OD_IOP_MMHG: 14
OS_IOP_MMHG: 12
IOP_METHOD: APPLANATION

## 2025-06-25 ASSESSMENT — SLIT LAMP EXAM - LIDS: COMMENTS: GOOD CREASE

## 2025-06-25 ASSESSMENT — VISUAL ACUITY
OS_CC: 20/30
METHOD: SNELLEN - LINEAR
OD_CC: 20/25
OD_CC+: -2
CORRECTION_TYPE: GLASSES
OS_CC+: -1

## 2025-06-25 ASSESSMENT — EXTERNAL EXAM - RIGHT EYE: OD_EXAM: NORMAL

## 2025-06-25 NOTE — PATIENT INSTRUCTIONS
"Continue:   Latanoprost (green top) every evening both eyes.  Cosopt (dorzolamide-timolol -- dark blue top) twice daily both eyes. About 12 hours apart.     May use artificial tears up to four times a day (like Refresh Optive, Systane Balance, or TheraTears. Avoid \"get the red out\" drops and generic artifical tears).     Wait at least 5 minutes between drops if using more than one at a time.     Return visit in 4 months for an intraocular pressure check.     Maxx Roque M.D.  [phone]    "

## 2025-06-25 NOTE — LETTER
"6/25/2025      Kathya Breaux  [address]  [facility] [address]      Dear Colleague,    Thank you for referring your patient, Kathya Breaux, to the [facility]. Please see a copy of my visit note below.     Current Eye Medications:  Dorzolamide/Timolol both eyes both eyes twice per day   Latanoprost both eyes at bedtime     Subjective:  Patient returns today for glaucoma follow up with testing. She hasn't noticed any ocular changes since her last visit.      Objective:  See Ophthalmology Exam.       Assessment:  Intraocular pressures improved both eyes in patient with open angle glaucoma; at target  Glaucoma OCT shows mild worsening of NFL both eyes.  Horta Visual Field mostly stable both eyes.      Plan:  Continue:   Latanoprost (green top) every evening both eyes.  Cosopt (dorzolamide-timolol -- dark blue top) twice daily both eyes. About 12 hours apart.     May use artificial tears up to four times a day (like Refresh Optive, Systane Balance, or TheraTears. Avoid \"get the red out\" drops and generic artifical tears).     Wait at least 5 minutes between drops if using more than one at a time.     Return visit in 4 months for an intraocular pressure check.  Discussed possible cataract surgery/MIGS in future.    Maxx Roque M.D.  [phone]         Again, thank you for allowing me to participate in the care of your patient.        Sincerely,        Maxx Roque MD    Electronically signed"

## 2025-06-25 NOTE — PROGRESS NOTES
" Current Eye Medications:  Dorzolamide/Timolol both eyes both eyes twice per day   Latanoprost both eyes at bedtime     Subjective:  Patient returns today for glaucoma follow up with testing. She hasn't noticed any ocular changes since her last visit.      Objective:  See Ophthalmology Exam.       Assessment:  Intraocular pressures improved both eyes in patient with open angle glaucoma; at target  Glaucoma OCT shows mild worsening of NFL both eyes.  Horta Visual Field mostly stable both eyes.      Plan:  Continue:   Latanoprost (green top) every evening both eyes.  Cosopt (dorzolamide-timolol -- dark blue top) twice daily both eyes. About 12 hours apart.     May use artificial tears up to four times a day (like Refresh Optive, Systane Balance, or TheraTears. Avoid \"get the red out\" drops and generic artifical tears).     Wait at least 5 minutes between drops if using more than one at a time.     Return visit in 4 months for an intraocular pressure check.  Discussed possible cataract surgery/MIGS in future.    Maxx Roque M.D.  [phone]       "

## 2025-06-30 ENCOUNTER — APPOINTMENT (OUTPATIENT)
Dept: URBAN - METROPOLITAN AREA CLINIC 254 | Age: 80
Setting detail: DERMATOLOGY
End: 2025-06-30

## 2025-06-30 VITALS — HEIGHT: 66 IN | WEIGHT: 110 LBS

## 2025-06-30 DIAGNOSIS — L57.8 OTHER SKIN CHANGES DUE TO CHRONIC EXPOSURE TO NONIONIZING RADIATION: ICD-10-CM

## 2025-06-30 DIAGNOSIS — D18.0 HEMANGIOMA: ICD-10-CM

## 2025-06-30 DIAGNOSIS — Z71.89 OTHER SPECIFIED COUNSELING: ICD-10-CM

## 2025-06-30 DIAGNOSIS — L82.1 OTHER SEBORRHEIC KERATOSIS: ICD-10-CM

## 2025-06-30 DIAGNOSIS — B07.8 OTHER VIRAL WARTS: ICD-10-CM

## 2025-06-30 DIAGNOSIS — L82.0 INFLAMED SEBORRHEIC KERATOSIS: ICD-10-CM

## 2025-06-30 DIAGNOSIS — D22 MELANOCYTIC NEVI: ICD-10-CM

## 2025-06-30 DIAGNOSIS — L81.4 OTHER MELANIN HYPERPIGMENTATION: ICD-10-CM

## 2025-06-30 PROBLEM — D22.5 MELANOCYTIC NEVI OF TRUNK: Status: ACTIVE | Noted: 2025-06-30

## 2025-06-30 PROBLEM — D18.01 HEMANGIOMA OF SKIN AND SUBCUTANEOUS TISSUE: Status: ACTIVE | Noted: 2025-06-30

## 2025-06-30 PROCEDURE — OTHER BENIGN DESTRUCTION: OTHER

## 2025-06-30 PROCEDURE — OTHER LIQUID NITROGEN: OTHER

## 2025-06-30 PROCEDURE — 17110 DESTRUCT B9 LESION 1-14: CPT

## 2025-06-30 PROCEDURE — OTHER COUNSELING: OTHER

## 2025-06-30 PROCEDURE — OTHER ADDITIONAL NOTES: OTHER

## 2025-06-30 PROCEDURE — 99213 OFFICE O/P EST LOW 20 MIN: CPT | Mod: 25

## 2025-06-30 ASSESSMENT — LOCATION ZONE DERM
LOCATION ZONE: FACE
LOCATION ZONE: TRUNK
LOCATION ZONE: LEG
LOCATION ZONE: HAND

## 2025-06-30 ASSESSMENT — LOCATION DETAILED DESCRIPTION DERM
LOCATION DETAILED: LEFT MEDIAL UPPER BACK
LOCATION DETAILED: RIGHT INFERIOR TEMPLE
LOCATION DETAILED: RIGHT ULNAR DORSAL HAND
LOCATION DETAILED: RIGHT PROXIMAL PRETIBIAL REGION
LOCATION DETAILED: LEFT PROXIMAL PRETIBIAL REGION
LOCATION DETAILED: LEFT DISTAL PRETIBIAL REGION
LOCATION DETAILED: RIGHT THENAR EMINENCE
LOCATION DETAILED: RIGHT ANTERIOR PROXIMAL THIGH
LOCATION DETAILED: RIGHT SUPERIOR LATERAL FOREHEAD
LOCATION DETAILED: RIGHT SUPERIOR UPPER BACK
LOCATION DETAILED: LEFT SUPERIOR MEDIAL UPPER BACK
LOCATION DETAILED: LEFT ANTERIOR DISTAL THIGH

## 2025-06-30 ASSESSMENT — LOCATION SIMPLE DESCRIPTION DERM
LOCATION SIMPLE: RIGHT FOREHEAD
LOCATION SIMPLE: RIGHT TEMPLE
LOCATION SIMPLE: LEFT UPPER BACK
LOCATION SIMPLE: LEFT THIGH
LOCATION SIMPLE: RIGHT HAND
LOCATION SIMPLE: LEFT PRETIBIAL REGION
LOCATION SIMPLE: RIGHT PRETIBIAL REGION
LOCATION SIMPLE: RIGHT UPPER BACK
LOCATION SIMPLE: RIGHT THIGH

## 2025-07-28 ENCOUNTER — TRANSFERRED RECORDS (OUTPATIENT)
Dept: MULTI SPECIALTY CLINIC | Facility: CLINIC | Age: 80
End: 2025-07-28
Payer: COMMERCIAL

## 2025-07-28 ENCOUNTER — TRANSFERRED RECORDS (OUTPATIENT)
Dept: ADMINISTRATIVE | Facility: CLINIC | Age: 80
End: 2025-07-28
Payer: COMMERCIAL

## 2025-07-28 LAB
ALT SERPL-CCNC: 12 IU/L (ref 0–32)
AST SERPL-CCNC: 19 IU/L (ref 0–40)

## 2025-07-29 NOTE — PROGRESS NOTES
_________________________________________________________________________________________________    Patient name: Kathya Breaux  YOB: 1945  Encounter date: 07/28/2025 09:00 AM  Current date: 07/28/2025 10:27 AM  Procedure: Infusion      Infusion/Additional Medication Orders    Assessment: Crohn's disease of both small and large intestine without complications K50.80     Medication grids  Order Date Medication Dose Route Rate Rate 2 Rate 3 Rate 4 Int. of Treat.   02/06/2025 Entyvio 300mg  mL/hr    8 Weeks   Inf. Date Medication % Conc. Inf. # Therapy Type Bag # Vials Total mgs Lot # Exp. Date   07/28/2025 Entyvio  5 maintenance  1 300.00 [id number] 07/30/2027   Inf. Date Medication IV Site IV Gauge Start Stop Total Time Wt. (lbs) Wt. (kgs)   07/28/2025 Entyvio left antecubital 22 8:52 AM 9:22 AM 0 hour(s) 52 minute(s) 109.80 49.796     Vitals Flowsheet  Time Temp Pulse Resp Sys BP Mary BP Reaction Conc Rate   8:37 AM 98.9 68 16 130 60      9:22 AM 98.0 64 16 127 60        Post Infusion  The patient did tolerate the infusion.     Nursing Notes  Order date: 02/06/2025  Last infusion date: 06/02/2025  Oral premeds per order: N  Specialty Pharmacy: N  Change in health status per assessment? N  IV maintenance 0.9% NS 500ml TKO  Start time: 8:45 am  IV start by: Renae Bashir RN  IV and Fluid D/C time: 9:37 am  NS volume infused: <50mL  Site condition: CDI and patent throughout infusion, no redness/swelling at IV site  D/C instructions reviewed, Pt verbalized understanding. Renae Bashir RN    Date Medication Total mg Used mg Wasted mg   07/28/2025 Entyvio 300.00 300.00 0.00   06/02/2025 Entyvio 300.00 300.00 0.00   04/04/2025 Entyvio 300.00 300.00 0.00   03/07/2025 Entyvio 300.00 300.00 0.00   02/21/2025 Entyvio 300.00 300.00 0.00       Visit oversight provided by:  Atif Rosas DO 07/28/2025 09:00 AM

## 2025-08-04 ENCOUNTER — TRANSFERRED RECORDS (OUTPATIENT)
Dept: ADMINISTRATIVE | Facility: CLINIC | Age: 80
End: 2025-08-04
Payer: COMMERCIAL

## 2025-08-13 SDOH — HEALTH STABILITY: PHYSICAL HEALTH: ON AVERAGE, HOW MANY MINUTES DO YOU ENGAGE IN EXERCISE AT THIS LEVEL?: 30 MIN

## 2025-08-13 SDOH — HEALTH STABILITY: PHYSICAL HEALTH: ON AVERAGE, HOW MANY DAYS PER WEEK DO YOU ENGAGE IN MODERATE TO STRENUOUS EXERCISE (LIKE A BRISK WALK)?: 3 DAYS

## 2025-08-13 ASSESSMENT — SOCIAL DETERMINANTS OF HEALTH (SDOH): HOW OFTEN DO YOU GET TOGETHER WITH FRIENDS OR RELATIVES?: THREE TIMES A WEEK

## 2025-08-16 ENCOUNTER — HEALTH MAINTENANCE LETTER (OUTPATIENT)
Age: 80
End: 2025-08-16

## 2025-08-18 ENCOUNTER — OFFICE VISIT (OUTPATIENT)
Dept: INTERNAL MEDICINE | Facility: CLINIC | Age: 80
End: 2025-08-18
Payer: COMMERCIAL

## 2025-08-18 VITALS
OXYGEN SATURATION: 98 % | DIASTOLIC BLOOD PRESSURE: 78 MMHG | RESPIRATION RATE: 18 BRPM | TEMPERATURE: 99.1 F | HEART RATE: 83 BPM | BODY MASS INDEX: 19.49 KG/M2 | SYSTOLIC BLOOD PRESSURE: 130 MMHG | WEIGHT: 110 LBS | HEIGHT: 63 IN

## 2025-08-18 DIAGNOSIS — D68.51 FACTOR V LEIDEN MUTATION: ICD-10-CM

## 2025-08-18 DIAGNOSIS — K50.818 CROHN'S DISEASE OF BOTH SMALL AND LARGE INTESTINE WITH OTHER COMPLICATION (H): ICD-10-CM

## 2025-08-18 DIAGNOSIS — Z23 NEED FOR VACCINATION: ICD-10-CM

## 2025-08-18 DIAGNOSIS — Z00.00 ENCOUNTER FOR MEDICARE ANNUAL WELLNESS EXAM: Primary | ICD-10-CM

## 2025-08-18 DIAGNOSIS — Z12.31 VISIT FOR SCREENING MAMMOGRAM: ICD-10-CM

## 2025-08-18 DIAGNOSIS — H61.23 IMPACTED CERUMEN OF BOTH EARS: ICD-10-CM

## 2025-08-18 DIAGNOSIS — M81.0 AGE-RELATED OSTEOPOROSIS WITHOUT CURRENT PATHOLOGICAL FRACTURE: ICD-10-CM

## 2025-08-18 DIAGNOSIS — Z13.6 CARDIOVASCULAR SCREENING; LDL GOAL LESS THAN 130: ICD-10-CM

## 2025-08-18 DIAGNOSIS — Z78.0 ASYMPTOMATIC POSTMENOPAUSAL STATE: ICD-10-CM

## 2025-08-18 DIAGNOSIS — Z86.718 PERSONAL HISTORY OF DVT (DEEP VEIN THROMBOSIS): ICD-10-CM

## 2025-08-18 PROCEDURE — 3078F DIAST BP <80 MM HG: CPT | Performed by: INTERNAL MEDICINE

## 2025-08-18 PROCEDURE — G0439 PPPS, SUBSEQ VISIT: HCPCS | Performed by: INTERNAL MEDICINE

## 2025-08-18 PROCEDURE — 69209 REMOVE IMPACTED EAR WAX UNI: CPT | Mod: 50 | Performed by: INTERNAL MEDICINE

## 2025-08-18 PROCEDURE — 3075F SYST BP GE 130 - 139MM HG: CPT | Performed by: INTERNAL MEDICINE

## 2025-08-18 RX ORDER — ACETAMINOPHEN 160 MG
1 TABLET,DISINTEGRATING ORAL SEE ADMIN INSTRUCTIONS
OUTPATIENT
Start: 2025-08-25

## 2025-08-18 RX ORDER — OMEPRAZOLE 20 MG/1
20 CAPSULE, DELAYED RELEASE ORAL DAILY
Qty: 100 CAPSULE | Refills: 2 | Status: SHIPPED | OUTPATIENT
Start: 2025-08-18

## 2025-08-18 RX ORDER — RIVAROXABAN 20 MG/1
20 TABLET, FILM COATED ORAL
Qty: 100 TABLET | Refills: 3 | Status: SHIPPED | OUTPATIENT
Start: 2025-08-18

## 2025-08-19 ENCOUNTER — PATIENT OUTREACH (OUTPATIENT)
Dept: CARE COORDINATION | Facility: CLINIC | Age: 80
End: 2025-08-19
Payer: COMMERCIAL

## (undated) DEVICE — SU SILK 6-0 G-1DA 18" 780G

## (undated) DEVICE — NDL 30GA 0.5" 305106

## (undated) DEVICE — SU SILK 4-0 P-3 8" 641G

## (undated) DEVICE — NDL 30GA 1" 305128

## (undated) DEVICE — GLOVE BIOGEL PI MICRO SZ 7.5 48575

## (undated) DEVICE — ESU ELEC NDL 1" COATED/INSULATED E1465

## (undated) DEVICE — SU PLAIN 6-0 G-1DA 18" 770G

## (undated) DEVICE — MARKER SKIN DOUBLE TIP W/FLEXI-RULER W/LABELS

## (undated) DEVICE — SYR 03ML LL W/O NDL

## (undated) DEVICE — SU VICRYL 5-0 S-14 12" J553G

## (undated) DEVICE — ESU EYE HIGH TEMP 65410-183

## (undated) DEVICE — ESU PENCIL SMOKE EVAC W/ROCKER SWITCH 0703-047-000

## (undated) DEVICE — SOL WATER IRRIG 1000ML BOTTLE 07139-09

## (undated) DEVICE — PACK MINOR EYE

## (undated) RX ORDER — ACETAMINOPHEN 325 MG/1
TABLET ORAL
Status: DISPENSED
Start: 2023-09-18

## (undated) RX ORDER — OXYCODONE HYDROCHLORIDE 5 MG/1
TABLET ORAL
Status: DISPENSED
Start: 2023-09-18